# Patient Record
Sex: FEMALE | Race: WHITE | NOT HISPANIC OR LATINO | URBAN - METROPOLITAN AREA
[De-identification: names, ages, dates, MRNs, and addresses within clinical notes are randomized per-mention and may not be internally consistent; named-entity substitution may affect disease eponyms.]

---

## 2017-01-27 ENCOUNTER — OFFICE VISIT (OUTPATIENT)
Dept: INTERNAL MEDICINE | Facility: CLINIC | Age: 52
End: 2017-01-27

## 2017-01-27 VITALS
BODY MASS INDEX: 20.02 KG/M2 | WEIGHT: 113 LBS | DIASTOLIC BLOOD PRESSURE: 70 MMHG | RESPIRATION RATE: 20 BRPM | SYSTOLIC BLOOD PRESSURE: 115 MMHG | HEART RATE: 76 BPM | TEMPERATURE: 98 F

## 2017-01-27 DIAGNOSIS — J01.00 ACUTE MAXILLARY SINUSITIS, RECURRENCE NOT SPECIFIED: ICD-10-CM

## 2017-01-27 DIAGNOSIS — J06.9 UPPER RESPIRATORY TRACT INFECTION, UNSPECIFIED TYPE: Primary | ICD-10-CM

## 2017-01-27 LAB
EXPIRATION DATE: NORMAL
FLUAV AG NPH QL: NEGATIVE
FLUBV AG NPH QL: NEGATIVE
INTERNAL CONTROL: NORMAL
Lab: NORMAL

## 2017-01-27 PROCEDURE — 87804 INFLUENZA ASSAY W/OPTIC: CPT | Performed by: PHYSICIAN ASSISTANT

## 2017-01-27 PROCEDURE — 99213 OFFICE O/P EST LOW 20 MIN: CPT | Performed by: PHYSICIAN ASSISTANT

## 2017-01-27 RX ORDER — CLARITHROMYCIN 500 MG/1
500 TABLET, COATED ORAL 2 TIMES DAILY
Qty: 20 TABLET | Refills: 0 | Status: SHIPPED | OUTPATIENT
Start: 2017-01-27 | End: 2017-02-02

## 2017-01-27 NOTE — MR AVS SNAPSHOT
Sera Guidry   1/27/2017 1:45 PM   Office Visit    Provider:  ARIAN Atkins   Department:  [facility] INTERNAL MEDICINE AND PEDIATRICS   Dept Phone:  [phone]                Your Full Care Plan              Today's Medication Changes          These changes are accurate as of: 1/27/17  2:36 PM.  If you have any questions, ask your nurse or doctor.               New Medication(s)Ordered:     clarithromycin 500 MG tablet   Commonly known as:  BIAXIN   Take 1 tablet by mouth 2 (Two) Times a Day.         Stop taking medication(s)listed here:     cefdinir 300 MG capsule   Commonly known as:  OMNICEF                Where to Get Your Medications      These medications were sent to [facility] - [address] [facility] [address] - [phone]  - [phone]   [address] [facility] [address]     Phone:  [phone]     clarithromycin 500 MG tablet                  Your Updated Medication List          This list is accurate as of: 1/27/17  2:36 PM.  Always use your most recent med list.                busPIRone 7.5 MG tablet   Commonly known as:  BUSPAR   Take 1 tablet by mouth 3 (three) times a day.       clarithromycin 500 MG tablet   Commonly known as:  BIAXIN   Take 1 tablet by mouth 2 (Two) Times a Day.       cyclobenzaprine 10 MG tablet   Commonly known as:  FLEXERIL   Take 1 tablet by mouth at night as needed for muscle spasms. as needed; For: Sciatica       guaiFENesin 600 MG 12 hr tablet   Commonly known as:  MUCINEX   Take 1 tablet by mouth daily.       triamcinolone 0.025 % ointment   Commonly known as:  KENALOG   Apply  topically 2 (two) times a day.       varenicline 0.5 MG X 11 & 1 MG X 42 tablet   Commonly known as:  CHANTIX STARTING MONTH MELANIA   Take 0.5 mg one daily on days 1-2 and 0.5 mg twice daily on days 4-7. Then 1 mg twice daily for a total of 12 weeks.               We Performed the Following     POC Influenza A / B       You Were Diagnosed With        Codes Comments    Upper respiratory tract infection, unspecified type    -  Primary ICD-10-CM: J06.9  ICD-9-CM: 465.9     Acute maxillary sinusitis, recurrence not specified     ICD-10-CM: J01.00  ICD-9-CM: 461.0       Instructions     None    Patient Instructions History      AnaergiaharMarqui Signup     [facility] [employer] allows you to send messages to your doctor, view your test results, renew your prescriptions, schedule appointments, and more. To sign up, go to [employer] and click on the Sign Up Now link in the New User? box. Enter your [employer] Activation Code exactly as it appears below along with the last four digits of your Social Security Number and your Date of Birth () to complete the sign-up process. If you do not sign up before the expiration date, you must request a new code.    [employer] Activation Code: [email or web address]  Expires: 2/10/2017  2:35 PM    If you have questions, you can email [email or web address] or call [phone] to talk to our [employer] staff. Remember, [employer] is NOT to be used for urgent needs. For medical emergencies, dial 911.               Other Info from Your Visit           Your Appointments     2017 10:45 AM EST   Pap Smear/Pelvic Exam with Concha Robles DO   [facility] INTERNAL MEDICINE AND PEDIATRICS (--)    [address] [address] [phone]              Allergies     Penicillins        Reason for Visit     Generalized Body Aches           Vital Signs     Blood Pressure Pulse Temperature Respirations Weight Body Mass Index    115/70 76 98 °F (36.7 °C) (Temporal Artery ) 20 113 lb (51.3 kg) 20.02 kg/m2    Smoking Status                   Current Every Day Smoker           Problems and Diagnoses Noted     Upper respiratory infection    -  Primary    Acute maxillary sinusitis

## 2017-01-27 NOTE — PROGRESS NOTES
"Subjective   Sera Guidry is a 51 y.o. female.   Chief Complaint   Patient presents with   • Generalized Body Aches       History of Present Illness     Pt here with complaints of body aches.  She thought that she had food poisoning 5-6 days ago after she ate a mexican restaurant and had nausea, vomiting and diarrhea.  Thought it would resolve within 24 hours, but she continues to have symptoms 5-6 days later.  She feels like \"she has been run over by a bus.\"  Says that when she eats something, she either has diarrhea or vomiting after.  She felt feverish yesterday and took tylenol, and then started sweating.  No fevers today.  Occasionally feels the urge to have a BM, but can't.  People at her job have been ill as well. She reports sinus pressure, congestion, cough.  She was taking theraflu and mucinex.  She continues to smoke 3-4 cigarettes/day.     The following portions of the patient's history were reviewed and updated as appropriate: allergies, current medications, past family history, past medical history, past social history, past surgical history and problem list.    Review of Systems   Constitutional: Positive for chills and fever.   HENT: Positive for congestion, rhinorrhea, sinus pressure and sore throat.    Respiratory: Positive for cough.    Cardiovascular: Negative.    Gastrointestinal: Positive for abdominal pain, diarrhea and vomiting.   Genitourinary: Negative.    Musculoskeletal: Positive for myalgias.   Neurological: Negative.    Psychiatric/Behavioral: Negative.        Objective   Physical Exam   Constitutional: She is oriented to person, place, and time. She appears well-developed and well-nourished.   HENT:   Right Ear: External ear normal.   Left Ear: External ear normal.   Nose: Right sinus exhibits maxillary sinus tenderness and frontal sinus tenderness. Left sinus exhibits maxillary sinus tenderness and frontal sinus tenderness.   Mouth/Throat: Oropharynx is clear and moist.   Neck: " Normal range of motion. Neck supple. Thyromegaly present.   Cardiovascular: Normal rate, regular rhythm and normal heart sounds.    No murmur heard.  Pulmonary/Chest: Effort normal and breath sounds normal.   Abdominal: Bowel sounds are increased. There is tenderness.   Lymphadenopathy:     She has cervical adenopathy.   Neurological: She is alert and oriented to person, place, and time.   Psychiatric: She has a normal mood and affect. Judgment normal.   Vitals reviewed.    Results for orders placed or performed in visit on 01/27/17   POC Influenza A / B   Result Value Ref Range    Rapid Influenza A Ag NEGATIVE     Rapid Influenza B Ag NEGATIVE     Internal Control Passed Passed    Lot Number [id number]     Expiration Date 6-18          Assessment/Plan   Sera was seen today for generalized body aches.    Diagnoses and all orders for this visit:    Upper respiratory tract infection, unspecified type  -     POC Influenza A / B    Acute maxillary sinusitis, recurrence not specified    Other orders  -     clarithromycin (BIAXIN) 500 MG tablet; Take 1 tablet by mouth 2 (Two) Times a Day.

## 2017-02-02 ENCOUNTER — PROCEDURE VISIT (OUTPATIENT)
Dept: INTERNAL MEDICINE | Facility: CLINIC | Age: 52
End: 2017-02-02

## 2017-02-02 VITALS
HEART RATE: 64 BPM | BODY MASS INDEX: 20.02 KG/M2 | DIASTOLIC BLOOD PRESSURE: 90 MMHG | WEIGHT: 113 LBS | RESPIRATION RATE: 16 BRPM | SYSTOLIC BLOOD PRESSURE: 122 MMHG

## 2017-02-02 DIAGNOSIS — Z13.6 SCREENING FOR CARDIOVASCULAR CONDITION: ICD-10-CM

## 2017-02-02 DIAGNOSIS — E34.9 HORMONE IMBALANCE: ICD-10-CM

## 2017-02-02 DIAGNOSIS — G47.62 LEG CRAMPS, SLEEP RELATED: ICD-10-CM

## 2017-02-02 DIAGNOSIS — Z13.820 OSTEOPOROSIS SCREENING: ICD-10-CM

## 2017-02-02 DIAGNOSIS — Z12.11 ENCOUNTER FOR SCREENING COLONOSCOPY: Primary | ICD-10-CM

## 2017-02-02 DIAGNOSIS — L98.9 SKIN LESION: ICD-10-CM

## 2017-02-02 DIAGNOSIS — F41.9 ANXIETY: ICD-10-CM

## 2017-02-02 DIAGNOSIS — Z12.39 BREAST CANCER SCREENING: ICD-10-CM

## 2017-02-02 LAB
ALBUMIN SERPL-MCNC: 4.8 G/DL (ref 3.2–4.8)
ALBUMIN/GLOB SERPL: 1.5 G/DL (ref 1.5–2.5)
ALP SERPL-CCNC: 115 U/L (ref 25–100)
ALT SERPL W P-5'-P-CCNC: 26 U/L (ref 7–40)
ANION GAP SERPL CALCULATED.3IONS-SCNC: 6 MMOL/L (ref 3–11)
ARTICHOKE IGE QN: 118 MG/DL (ref 0–130)
AST SERPL-CCNC: 28 U/L (ref 0–33)
BILIRUB SERPL-MCNC: 0.6 MG/DL (ref 0.3–1.2)
BUN BLD-MCNC: 17 MG/DL (ref 9–23)
BUN/CREAT SERPL: 24.3 (ref 7–25)
CALCIUM SPEC-SCNC: 10.7 MG/DL (ref 8.7–10.4)
CHLORIDE SERPL-SCNC: 101 MMOL/L (ref 99–109)
CHOLEST SERPL-MCNC: 280 MG/DL (ref 0–200)
CO2 SERPL-SCNC: 29 MMOL/L (ref 20–31)
CREAT BLD-MCNC: 0.7 MG/DL (ref 0.6–1.3)
DEPRECATED RDW RBC AUTO: 47.5 FL (ref 37–54)
ERYTHROCYTE [DISTWIDTH] IN BLOOD BY AUTOMATED COUNT: 13.1 % (ref 11.3–14.5)
GFR SERPL CREATININE-BSD FRML MDRD: 88 ML/MIN/1.73
GLOBULIN UR ELPH-MCNC: 3.2 GM/DL
GLUCOSE BLD-MCNC: 102 MG/DL (ref 70–100)
HCT VFR BLD AUTO: 48.3 % (ref 34.5–44)
HDLC SERPL-MCNC: >115 MG/DL (ref 40–60)
HGB BLD-MCNC: 16.6 G/DL (ref 11.5–15.5)
MCH RBC QN AUTO: 34.2 PG (ref 27–31)
MCHC RBC AUTO-ENTMCNC: 34.4 G/DL (ref 32–36)
MCV RBC AUTO: 99.6 FL (ref 80–99)
PLATELET # BLD AUTO: 277 10*3/MM3 (ref 150–450)
PMV BLD AUTO: 10 FL (ref 6–12)
POTASSIUM BLD-SCNC: 4.7 MMOL/L (ref 3.5–5.5)
PROT SERPL-MCNC: 8 G/DL (ref 5.7–8.2)
RBC # BLD AUTO: 4.85 10*6/MM3 (ref 3.89–5.14)
SODIUM BLD-SCNC: 136 MMOL/L (ref 132–146)
TRIGL SERPL-MCNC: 85 MG/DL (ref 0–150)
WBC NRBC COR # BLD: 9.53 10*3/MM3 (ref 3.5–10.8)

## 2017-02-02 PROCEDURE — 80053 COMPREHEN METABOLIC PANEL: CPT | Performed by: INTERNAL MEDICINE

## 2017-02-02 PROCEDURE — 99396 PREV VISIT EST AGE 40-64: CPT | Performed by: INTERNAL MEDICINE

## 2017-02-02 PROCEDURE — 80061 LIPID PANEL: CPT | Performed by: INTERNAL MEDICINE

## 2017-02-02 PROCEDURE — 36415 COLL VENOUS BLD VENIPUNCTURE: CPT | Performed by: INTERNAL MEDICINE

## 2017-02-02 PROCEDURE — 85027 COMPLETE CBC AUTOMATED: CPT | Performed by: INTERNAL MEDICINE

## 2017-02-02 RX ORDER — TIZANIDINE 4 MG/1
4 TABLET ORAL NIGHTLY PRN
Qty: 30 TABLET | Refills: 3 | Status: SHIPPED | OUTPATIENT
Start: 2017-02-02 | End: 2018-01-25 | Stop reason: SDUPTHER

## 2017-02-02 RX ORDER — BUSPIRONE HYDROCHLORIDE 7.5 MG/1
7.5 TABLET ORAL 3 TIMES DAILY
Qty: 90 TABLET | Refills: 3 | Status: SHIPPED | OUTPATIENT
Start: 2017-02-02 | End: 2018-01-25 | Stop reason: SDUPTHER

## 2017-02-02 NOTE — PROGRESS NOTES
Subjective   Sera Guidry is a 51 y.o. female   Pt is here for physical today.         History of Present Illness   Pt is here for physical today. She has the chronic issue of anxiety and back spasms which is controlled on current medication.  Colonoscopy- Due now   Mammogram- Due now  Pap- Last Pap 30 years ago,  She had complete hysterectomy due to having cervical endometreosis?, had vaginal swab done after and states this was fine.   Breast exam- Due now   Tetanus-  Tdap due now   Flu vaccine- December 2016   Shingles vaccine- Pt is going to check with insurance   PNA vaccine- Not due til 60's.   DEXA scan- Due now.   Pt is down to 4 cigarettes per day, she has smoked since 11.   She is interested in seeing GYN to discuss hormone replacement options.         The following portions of the patient's history were reviewed and updated as appropriate: allergies, current medications, past family history, past medical history, past social history, past surgical history and problem list.           Review of Systems   Constitutional: Negative.    HENT: Negative.    Eyes: Negative.    Respiratory: Negative.    Cardiovascular: Negative.    Gastrointestinal: Negative.    Endocrine: Negative.    Genitourinary: Negative.    Musculoskeletal:        See HPI.    Skin: Negative.    Allergic/Immunologic: Negative.    Neurological: Negative.    Hematological: Negative.    Psychiatric/Behavioral:        See HPI.                 Objective   Physical Exam   Constitutional: She is oriented to person, place, and time. She appears well-developed and well-nourished.   HENT:   Head: Normocephalic and atraumatic.   Right Ear: External ear normal.   Left Ear: External ear normal.   Nose: Nose normal.   Mouth/Throat: Oropharynx is clear and moist.   Eyes: Conjunctivae and EOM are normal. Pupils are equal, round, and reactive to light.   Neck: Normal range of motion. Neck supple. No tracheal deviation present. No thyromegaly present.    Cardiovascular: Normal rate, regular rhythm, normal heart sounds and intact distal pulses.    Pulmonary/Chest: Effort normal and breath sounds normal. No respiratory distress. She has no wheezes. She has no rales. Chest wall is not dull to percussion. She exhibits no mass, no tenderness, no bony tenderness, no laceration, no crepitus, no edema, no deformity, no swelling and no retraction. Right breast exhibits no inverted nipple, no mass, no nipple discharge, no skin change and no tenderness. Left breast exhibits no inverted nipple, no mass, no nipple discharge, no skin change and no tenderness. Breasts are symmetrical. There is no breast swelling.   Abdominal: Soft. She exhibits no distension. There is no tenderness.   Genitourinary: No breast tenderness, discharge or bleeding.   Neurological: She is alert and oriented to person, place, and time. She has normal reflexes.   Skin: Skin is warm and dry.   seborrhoic keratosis on LUQ  One skin lesion noted on neck less than 1 cm in size.    Psychiatric: She has a normal mood and affect.   Nursing note and vitals reviewed.                 Assessment/Plan    Encounter for screening colonoscopy  -     Ambulatory Referral For Screening Colonoscopy    Anxiety    Leg cramps, sleep related  -     tiZANidine (ZANAFLEX) 4 MG tablet; Take 1 tablet by mouth At Night As Needed for muscle spasms.    Osteoporosis screening  -      DEXA SCAN    Breast cancer screening  -     Mammo Screening Bilateral With CAD; Future    Skin lesion  -     Ambulatory Referral to Dermatology    Hormone imbalance  -     Ambulatory Referral to Gynecology    Screening for cardiovascular condition  -     Comprehensive Metabolic Panel  -     Lipid Panel  -     CBC (No Diff)    Other orders  -     busPIRone (BUSPAR) 7.5 MG tablet; Take 1 tablet by mouth 3 (Three) Times a Day.          1.) Refer to Dermatology   2.) Check CBC,CMP and lipid panel   3.) Refer to Gyn   4.) Refer to Derm   5.) Colonoscopy and  Mammogram order placed.   6.) DEXA scan ordered   7.) Tdap due now- instructed to go to health department or Pharmacy.  8.) Refill chronic meds   9.) Stop Flexeril and change to Zanaflex 4 mg Po at night prn how to dose and possible s/e discussed.   10.) Pt has received flu vaccine this year.   11.) Follow up in 6 months     * Total time spent in discussion and exam 40 minutes.

## 2017-02-03 ENCOUNTER — TRANSCRIBE ORDERS (OUTPATIENT)
Dept: INTERNAL MEDICINE | Facility: CLINIC | Age: 52
End: 2017-02-03

## 2017-02-03 DIAGNOSIS — R92.8 ABNORMAL MAMMOGRAM: Primary | ICD-10-CM

## 2017-02-03 DIAGNOSIS — Z12.31 VISIT FOR SCREENING MAMMOGRAM: Primary | ICD-10-CM

## 2018-01-25 ENCOUNTER — OFFICE VISIT (OUTPATIENT)
Dept: INTERNAL MEDICINE | Facility: CLINIC | Age: 53
End: 2018-01-25

## 2018-01-25 VITALS
OXYGEN SATURATION: 96 % | WEIGHT: 115.2 LBS | HEART RATE: 71 BPM | SYSTOLIC BLOOD PRESSURE: 122 MMHG | HEIGHT: 63 IN | DIASTOLIC BLOOD PRESSURE: 84 MMHG | TEMPERATURE: 98.1 F | BODY MASS INDEX: 20.41 KG/M2

## 2018-01-25 DIAGNOSIS — Z13.820 OSTEOPOROSIS SCREENING: ICD-10-CM

## 2018-01-25 DIAGNOSIS — F33.41 RECURRENT MAJOR DEPRESSIVE DISORDER, IN PARTIAL REMISSION (HCC): ICD-10-CM

## 2018-01-25 DIAGNOSIS — Z11.59 NEED FOR HEPATITIS C SCREENING TEST: ICD-10-CM

## 2018-01-25 DIAGNOSIS — Z78.0 MENOPAUSE: ICD-10-CM

## 2018-01-25 DIAGNOSIS — F41.9 ANXIETY: ICD-10-CM

## 2018-01-25 DIAGNOSIS — Z12.11 ENCOUNTER FOR SCREENING COLONOSCOPY: ICD-10-CM

## 2018-01-25 DIAGNOSIS — Z13.220 SCREENING FOR LIPID DISORDERS: ICD-10-CM

## 2018-01-25 DIAGNOSIS — R73.09 ABNORMAL GLUCOSE: ICD-10-CM

## 2018-01-25 DIAGNOSIS — E83.52 SERUM CALCIUM ELEVATED: ICD-10-CM

## 2018-01-25 DIAGNOSIS — G47.62 LEG CRAMPS, SLEEP RELATED: ICD-10-CM

## 2018-01-25 DIAGNOSIS — R53.83 OTHER FATIGUE: ICD-10-CM

## 2018-01-25 DIAGNOSIS — Z76.89 ENCOUNTER TO ESTABLISH CARE WITH NEW DOCTOR: ICD-10-CM

## 2018-01-25 DIAGNOSIS — Z72.0 TOBACCO USE: ICD-10-CM

## 2018-01-25 DIAGNOSIS — Z79.899 ENCOUNTER FOR LONG-TERM CURRENT USE OF MEDICATION: ICD-10-CM

## 2018-01-25 DIAGNOSIS — Z87.898 HISTORY OF ABNORMAL MAMMOGRAM: Primary | ICD-10-CM

## 2018-01-25 LAB
25(OH)D3 SERPL-MCNC: 35.1 NG/ML
ALBUMIN SERPL-MCNC: 4.6 G/DL (ref 3.2–4.8)
ALBUMIN/GLOB SERPL: 2 G/DL (ref 1.5–2.5)
ALP SERPL-CCNC: 96 U/L (ref 25–100)
ALT SERPL W P-5'-P-CCNC: 15 U/L (ref 7–40)
ANION GAP SERPL CALCULATED.3IONS-SCNC: 4 MMOL/L (ref 3–11)
ARTICHOKE IGE QN: 133 MG/DL (ref 0–130)
AST SERPL-CCNC: 22 U/L (ref 0–33)
BASOPHILS # BLD AUTO: 0.06 10*3/MM3 (ref 0–0.2)
BASOPHILS NFR BLD AUTO: 0.5 % (ref 0–1)
BILIRUB SERPL-MCNC: 0.3 MG/DL (ref 0.3–1.2)
BUN BLD-MCNC: 19 MG/DL (ref 9–23)
BUN/CREAT SERPL: 31.7 (ref 7–25)
CALCIUM SPEC-SCNC: 10.1 MG/DL (ref 8.7–10.4)
CHLORIDE SERPL-SCNC: 107 MMOL/L (ref 99–109)
CHOLEST SERPL-MCNC: 219 MG/DL (ref 0–200)
CK SERPL-CCNC: 96 U/L (ref 26–174)
CO2 SERPL-SCNC: 27 MMOL/L (ref 20–31)
CREAT BLD-MCNC: 0.6 MG/DL (ref 0.6–1.3)
DEPRECATED RDW RBC AUTO: 49.1 FL (ref 37–54)
EOSINOPHIL # BLD AUTO: 0.62 10*3/MM3 (ref 0–0.3)
EOSINOPHIL NFR BLD AUTO: 5.4 % (ref 0–3)
ERYTHROCYTE [DISTWIDTH] IN BLOOD BY AUTOMATED COUNT: 14 % (ref 11.3–14.5)
FOLATE SERPL-MCNC: 14.01 NG/ML (ref 3.2–20)
GFR SERPL CREATININE-BSD FRML MDRD: 105 ML/MIN/1.73
GLOBULIN UR ELPH-MCNC: 2.3 GM/DL
GLUCOSE BLD-MCNC: 75 MG/DL (ref 70–100)
HBA1C MFR BLD: 5.8 % (ref 4.8–5.6)
HCT VFR BLD AUTO: 39.7 % (ref 34.5–44)
HCV AB SER DONR QL: NORMAL
HDLC SERPL-MCNC: 75 MG/DL (ref 40–60)
HGB BLD-MCNC: 13.1 G/DL (ref 11.5–15.5)
IMM GRANULOCYTES # BLD: 0.03 10*3/MM3 (ref 0–0.03)
IMM GRANULOCYTES NFR BLD: 0.3 % (ref 0–0.6)
LYMPHOCYTES # BLD AUTO: 5.05 10*3/MM3 (ref 0.6–4.8)
LYMPHOCYTES NFR BLD AUTO: 44 % (ref 24–44)
MCH RBC QN AUTO: 31.5 PG (ref 27–31)
MCHC RBC AUTO-ENTMCNC: 33 G/DL (ref 32–36)
MCV RBC AUTO: 95.4 FL (ref 80–99)
MONOCYTES # BLD AUTO: 0.88 10*3/MM3 (ref 0–1)
MONOCYTES NFR BLD AUTO: 7.7 % (ref 0–12)
NEUTROPHILS # BLD AUTO: 4.84 10*3/MM3 (ref 1.5–8.3)
NEUTROPHILS NFR BLD AUTO: 42.1 % (ref 41–71)
PLATELET # BLD AUTO: 379 10*3/MM3 (ref 150–450)
PMV BLD AUTO: 10 FL (ref 6–12)
POTASSIUM BLD-SCNC: 4.1 MMOL/L (ref 3.5–5.5)
PROT SERPL-MCNC: 6.9 G/DL (ref 5.7–8.2)
RBC # BLD AUTO: 4.16 10*6/MM3 (ref 3.89–5.14)
SODIUM BLD-SCNC: 138 MMOL/L (ref 132–146)
TRIGL SERPL-MCNC: 106 MG/DL (ref 0–150)
TSH SERPL DL<=0.05 MIU/L-ACNC: 2.11 MIU/ML (ref 0.35–5.35)
VIT B12 BLD-MCNC: 751 PG/ML (ref 211–911)
WBC NRBC COR # BLD: 11.48 10*3/MM3 (ref 3.5–10.8)

## 2018-01-25 PROCEDURE — 82607 VITAMIN B-12: CPT | Performed by: FAMILY MEDICINE

## 2018-01-25 PROCEDURE — 82550 ASSAY OF CK (CPK): CPT | Performed by: FAMILY MEDICINE

## 2018-01-25 PROCEDURE — 86803 HEPATITIS C AB TEST: CPT | Performed by: FAMILY MEDICINE

## 2018-01-25 PROCEDURE — 82306 VITAMIN D 25 HYDROXY: CPT | Performed by: FAMILY MEDICINE

## 2018-01-25 PROCEDURE — 99214 OFFICE O/P EST MOD 30 MIN: CPT | Performed by: FAMILY MEDICINE

## 2018-01-25 PROCEDURE — 83036 HEMOGLOBIN GLYCOSYLATED A1C: CPT | Performed by: FAMILY MEDICINE

## 2018-01-25 PROCEDURE — 83970 ASSAY OF PARATHORMONE: CPT | Performed by: FAMILY MEDICINE

## 2018-01-25 PROCEDURE — 80061 LIPID PANEL: CPT | Performed by: FAMILY MEDICINE

## 2018-01-25 PROCEDURE — 82746 ASSAY OF FOLIC ACID SERUM: CPT | Performed by: FAMILY MEDICINE

## 2018-01-25 PROCEDURE — 80050 GENERAL HEALTH PANEL: CPT | Performed by: FAMILY MEDICINE

## 2018-01-25 RX ORDER — BUSPIRONE HYDROCHLORIDE 7.5 MG/1
7.5 TABLET ORAL NIGHTLY
Qty: 90 TABLET | Refills: 1 | Status: SHIPPED | OUTPATIENT
Start: 2018-01-25 | End: 2018-08-01 | Stop reason: SDUPTHER

## 2018-01-25 RX ORDER — TIZANIDINE 4 MG/1
4 TABLET ORAL NIGHTLY PRN
Qty: 30 TABLET | Refills: 3 | Status: SHIPPED | OUTPATIENT
Start: 2018-01-25 | End: 2018-02-16 | Stop reason: SDUPTHER

## 2018-01-25 RX ORDER — CITALOPRAM 10 MG/1
10 TABLET ORAL DAILY
Qty: 30 TABLET | Refills: 1 | Status: SHIPPED | OUTPATIENT
Start: 2018-01-25 | End: 2018-02-16 | Stop reason: SDUPTHER
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
## 2018-01-27 LAB — PTH-INTACT SERPL-MCNC: 25 PG/ML (ref 15–65)

## 2018-02-02 DIAGNOSIS — Z78.0 MENOPAUSE: Primary | ICD-10-CM

## 2018-02-16 ENCOUNTER — OFFICE VISIT (OUTPATIENT)
Dept: INTERNAL MEDICINE | Facility: CLINIC | Age: 53
End: 2018-02-16

## 2018-02-16 VITALS
SYSTOLIC BLOOD PRESSURE: 124 MMHG | TEMPERATURE: 98.2 F | DIASTOLIC BLOOD PRESSURE: 76 MMHG | HEART RATE: 73 BPM | BODY MASS INDEX: 20.36 KG/M2 | WEIGHT: 114.2 LBS | OXYGEN SATURATION: 98 %

## 2018-02-16 DIAGNOSIS — F33.41 RECURRENT MAJOR DEPRESSIVE DISORDER, IN PARTIAL REMISSION (HCC): ICD-10-CM

## 2018-02-16 DIAGNOSIS — G47.62 LEG CRAMPS, SLEEP RELATED: ICD-10-CM

## 2018-02-16 DIAGNOSIS — F41.9 ANXIETY: Primary | ICD-10-CM

## 2018-02-16 PROCEDURE — 99214 OFFICE O/P EST MOD 30 MIN: CPT | Performed by: FAMILY MEDICINE

## 2018-02-16 RX ORDER — CHOLECALCIFEROL (VITAMIN D3) 125 MCG
500 CAPSULE ORAL DAILY
COMMUNITY

## 2018-02-16 RX ORDER — CITALOPRAM 10 MG/1
10 TABLET ORAL DAILY
Qty: 30 TABLET | Refills: 3 | Status: SHIPPED | OUTPATIENT
Start: 2018-02-16 | End: 2018-08-01 | Stop reason: DRUGHIGH

## 2018-02-16 RX ORDER — TIZANIDINE 4 MG/1
4 TABLET ORAL 2 TIMES DAILY PRN
Qty: 60 TABLET | Refills: 3 | Status: SHIPPED | OUTPATIENT
Start: 2018-02-16 | End: 2018-08-01

## 2018-02-16 NOTE — PROGRESS NOTES
Subjective   Sera Guidry is a 52 y.o. female.     Chief Complaint   Patient presents with   • med management     3 month follow up   • Headache       Visit Vitals   • /76   • Pulse 73   • Temp 98.2 °F (36.8 °C)   • Wt 51.8 kg (114 lb 3.2 oz)   • SpO2 98%  Comment: nail polish may have altered results   • BMI 20.36 kg/m2       Depression   Visit Type: follow-up  Patient is not experiencing: anhedonia, chest pain, choking sensation, compulsions, confusion, decreased concentration, depressed mood, dizziness, dry mouth, excessive worry, fatigue, feelings of hopelessness, feelings of worthlessness, hypersomnia, hyperventilation, insomnia, irritability, malaise, memory impairment, muscle tension, nausea, nervousness/anxiety, obsessions, palpitations, panic, psychomotor agitation, psychomotor retardation, restlessness, shortness of breath, suicidal ideas, suicidal planning, thoughts of death, weight gain and weight loss.  Frequency of symptoms: rarely   Severity: mild   Sleep quality: fair  Nighttime awakenings: several  Compliance with medications:  %        Anxiety   Presents for follow-up visit. Patient reports no chest pain, compulsions, confusion, decreased concentration, depressed mood, dizziness, dry mouth, excessive worry, feeling of choking, hyperventilation, insomnia, irritability, malaise, muscle tension, nausea, nervous/anxious behavior, obsessions, palpitations, panic, restlessness, shortness of breath or suicidal ideas. Symptoms occur rarely. The severity of symptoms is mild. The quality of sleep is fair. Nighttime awakenings: several.     Her past medical history is significant for depression. Compliance with medications is %.   Headache    This is a new problem. The current episode started 1 to 4 weeks ago. The problem occurs daily. The problem has been unchanged. The pain is located in the bilateral region. The pain radiates to the left shoulder, right shoulder, right neck and left  neck. The pain quality is similar to prior headaches. The quality of the pain is described as dull and aching. The pain is at a severity of 9/10. The pain is severe. Associated symptoms include back pain, muscle aches, neck pain, phonophobia, photophobia and scalp tenderness. Pertinent negatives include no abdominal pain, abnormal behavior, anorexia, blurred vision, coughing, dizziness, drainage, ear pain, eye pain, eye redness, eye watering, facial sweating, fever, hearing loss, insomnia, loss of balance, nausea, numbness, rhinorrhea, seizures, sinus pressure, sore throat, swollen glands, tingling, tinnitus, visual change, vomiting, weakness or weight loss. Nothing aggravates the symptoms. She has tried NSAIDs for the symptoms. The treatment provided significant relief. Her past medical history is significant for migraine headaches and migraines in the family. There is no history of cancer, cluster headaches, hypertension, immunosuppression, obesity, pseudotumor cerebri, recent head traumas, sinus disease or TMJ.      Pt has headache that starts as tension headache and goes up over top of scalp.  Aleve heals for a short while.   Pt has had neck popped by Chiropractor-Dr Prabhjot Acosta with rapid improvement of headache.   Pt has hx of scoliosis.    The following portions of the patient's history were reviewed and updated as appropriate: allergies, current medications, past family history, past medical history, past social history, past surgical history and problem list.    Past Medical History:   Diagnosis Date   • Allergy    • Anxiety    • Depression    • Ovarian cancer     endometrial/cervical   • Ovarian cyst      Past Surgical History:   Procedure Laterality Date   • APPENDECTOMY     • HYSTERECTOMY     • ORIF WRIST FRACTURE Left 09/2017    with 2 plates   • TOTAL ABDOMINAL HYSTERECTOMY WITH SALPINGO OOPHORECTOMY      ovaries removed at 2 separate occcasions     Allergies   Allergen Reactions   • Penicillins Hives      Family History   Problem Relation Age of Onset   • Allergies Other    • ADD / ADHD Other    • Heart disease Other      cardiac disorder   • Stroke Other    • Depression Other    • Hypertension Other    • Lung cancer Other    • Uterine cancer Other    • Ovarian cancer Other    • Cancer Mother      left lung   • Stroke Mother      x 2   • Stroke Father      x 3   • Heart disease Father      heart attack   • No Known Problems Son    • No Known Problems Son    • No Known Problems Son      Social History     Social History   • Marital status:      Spouse name: N/A   • Number of children: N/A   • Years of education: N/A     Occupational History   • Not on file.     Social History Main Topics   • Smoking status: Current Every Day Smoker     Packs/day: 0.50     Years: 40.00     Types: Cigarettes   • Smokeless tobacco: Never Used   • Alcohol use No      Comment: quit   • Drug use: No      Comment: quit MJ   • Sexual activity: Defer     Other Topics Concern   • Not on file     Social History Narrative       Review of Systems   Constitutional: Negative.  Negative for chills, diaphoresis, fatigue, fever, irritability, weight gain and weight loss.   HENT: Negative for ear pain, hearing loss, nosebleeds, postnasal drip, rhinorrhea, sinus pressure, sneezing, sore throat and tinnitus.    Eyes: Positive for photophobia. Negative for blurred vision, pain, redness and itching.   Respiratory: Negative.  Negative for cough, choking, shortness of breath and wheezing.    Cardiovascular: Negative.  Negative for chest pain and palpitations.   Gastrointestinal: Negative.  Negative for abdominal pain, anorexia, constipation, diarrhea, nausea and vomiting.   Endocrine: Negative.  Negative for cold intolerance and heat intolerance.   Genitourinary: Negative.  Negative for dysuria, frequency, hematuria and urgency.   Musculoskeletal: Positive for back pain and neck pain. Negative for arthralgias.   Skin: Negative.  Negative for color  change and rash.   Allergic/Immunologic: Negative.  Negative for environmental allergies.   Neurological: Positive for headaches. Negative for dizziness, tingling, seizures, syncope, weakness, light-headedness, numbness and loss of balance.   Hematological: Negative.  Negative for adenopathy. Does not bruise/bleed easily.   Psychiatric/Behavioral: Negative.  Negative for confusion, decreased concentration, dysphoric mood and suicidal ideas. The patient is not nervous/anxious and does not have insomnia.         Anxiety and depression stable on medication       Objective   Physical Exam   Constitutional: She is oriented to person, place, and time. She appears well-developed.   HENT:   Head: Normocephalic.   Right Ear: External ear normal.   Left Ear: External ear normal.   Nose: Nose normal.   Mouth/Throat: Oropharynx is clear and moist.   Eyes: Conjunctivae and EOM are normal. Pupils are equal, round, and reactive to light.   Neck: Normal range of motion. Neck supple. Spinous process tenderness and muscular tenderness present.   Cardiovascular: Normal rate and regular rhythm.    No murmur heard.  Pulmonary/Chest: Effort normal and breath sounds normal. No respiratory distress. She has no decreased breath sounds. She has no wheezes. She has no rhonchi. She has no rales.   Abdominal: Soft. Bowel sounds are normal. There is no tenderness.   Musculoskeletal: Normal range of motion.   Neurological: She is alert and oriented to person, place, and time.   Skin: Skin is warm and dry.   Psychiatric: She has a normal mood and affect. Her behavior is normal.   Nursing note and vitals reviewed.      Assessment/Plan   Sera was seen today for med management and headache.    Diagnoses and all orders for this visit:    Anxiety    Recurrent major depressive disorder, in partial remission  -     citalopram (CeleXA) 10 MG tablet; Take 1 tablet by mouth Daily.    Leg cramps, sleep related  -     tiZANidine (ZANAFLEX) 4 MG tablet; Take  1 tablet by mouth 2 (Two) Times a Day As Needed for Muscle Spasms.        Ok to take mid day zanaflex for trapezius spasm  Reviewed lab with pt.            Current Outpatient Prescriptions:   •  busPIRone (BUSPAR) 7.5 MG tablet, Take 1 tablet by mouth Every Night., Disp: 90 tablet, Rfl: 1  •  Cholecalciferol (VITAMIN D PO), Take  by mouth., Disp: , Rfl:   •  citalopram (CeleXA) 10 MG tablet, Take 1 tablet by mouth Daily., Disp: 30 tablet, Rfl: 3  •  Multiple Vitamins-Minerals (MULTIVITAMIN WOMEN PO), Take  by mouth., Disp: , Rfl:   •  tiZANidine (ZANAFLEX) 4 MG tablet, Take 1 tablet by mouth 2 (Two) Times a Day As Needed for Muscle Spasms., Disp: 60 tablet, Rfl: 3  •  vitamin B-12 (CYANOCOBALAMIN) 500 MCG tablet, Take 500 mcg by mouth Daily., Disp: , Rfl:   •  VITAMIN E PO, Take  by mouth., Disp: , Rfl:     Return in about 3 months (around 5/16/2018), or if symptoms worsen or fail to improve, for Recheck.    Recent Results (from the past 840 hour(s))   TSH    Collection Time: 01/25/18  4:26 PM   Result Value Ref Range    TSH 2.111 0.350 - 5.350 mIU/mL   Comprehensive Metabolic Panel    Collection Time: 01/25/18  4:26 PM   Result Value Ref Range    Glucose 75 70 - 100 mg/dL    BUN 19 9 - 23 mg/dL    Creatinine 0.60 0.60 - 1.30 mg/dL    Sodium 138 132 - 146 mmol/L    Potassium 4.1 3.5 - 5.5 mmol/L    Chloride 107 99 - 109 mmol/L    CO2 27.0 20.0 - 31.0 mmol/L    Calcium 10.1 8.7 - 10.4 mg/dL    Total Protein 6.9 5.7 - 8.2 g/dL    Albumin 4.60 3.20 - 4.80 g/dL    ALT (SGPT) 15 7 - 40 U/L    AST (SGOT) 22 0 - 33 U/L    Alkaline Phosphatase 96 25 - 100 U/L    Total Bilirubin 0.3 0.3 - 1.2 mg/dL    eGFR Non African Amer 105 >60 mL/min/1.73    Globulin 2.3 gm/dL    A/G Ratio 2.0 1.5 - 2.5 g/dL    BUN/Creatinine Ratio 31.7 (H) 7.0 - 25.0    Anion Gap 4.0 3.0 - 11.0 mmol/L   Lipid Panel    Collection Time: 01/25/18  4:26 PM   Result Value Ref Range    Total Cholesterol 219 (H) 0 - 200 mg/dL    Triglycerides 106 0 - 150 mg/dL     HDL Cholesterol 75 (H) 40 - 60 mg/dL    LDL Cholesterol  133 (H) 0 - 130 mg/dL   Hepatitis C Antibody    Collection Time: 01/25/18  4:26 PM   Result Value Ref Range    Hepatitis C Ab Non-Reactive Non-Reactive   Hemoglobin A1c    Collection Time: 01/25/18  4:26 PM   Result Value Ref Range    Hemoglobin A1C 5.80 (H) 4.80 - 5.60 %   Vitamin B12    Collection Time: 01/25/18  4:26 PM   Result Value Ref Range    Vitamin B-12 751 211 - 911 pg/mL   Folate    Collection Time: 01/25/18  4:26 PM   Result Value Ref Range    Folate 14.01 3.20 - 20.00 ng/mL   Vitamin D 25 Hydroxy    Collection Time: 01/25/18  4:26 PM   Result Value Ref Range    25 Hydroxy, Vitamin D 35.1 ng/ml   CK    Collection Time: 01/25/18  4:26 PM   Result Value Ref Range    Creatine Kinase 96 26 - 174 U/L   PTH, Intact    Collection Time: 01/25/18  4:26 PM   Result Value Ref Range    PTH, Intact 25 15 - 65 pg/mL   CBC Auto Differential    Collection Time: 01/25/18  4:26 PM   Result Value Ref Range    WBC 11.48 (H) 3.50 - 10.80 10*3/mm3    RBC 4.16 3.89 - 5.14 10*6/mm3    Hemoglobin 13.1 11.5 - 15.5 g/dL    Hematocrit 39.7 34.5 - 44.0 %    MCV 95.4 80.0 - 99.0 fL    MCH 31.5 (H) 27.0 - 31.0 pg    MCHC 33.0 32.0 - 36.0 g/dL    RDW 14.0 11.3 - 14.5 %    RDW-SD 49.1 37.0 - 54.0 fl    MPV 10.0 6.0 - 12.0 fL    Platelets 379 150 - 450 10*3/mm3    Neutrophil % 42.1 41.0 - 71.0 %    Lymphocyte % 44.0 24.0 - 44.0 %    Monocyte % 7.7 0.0 - 12.0 %    Eosinophil % 5.4 (H) 0.0 - 3.0 %    Basophil % 0.5 0.0 - 1.0 %    Immature Grans % 0.3 0.0 - 0.6 %    Neutrophils, Absolute 4.84 1.50 - 8.30 10*3/mm3    Lymphocytes, Absolute 5.05 (H) 0.60 - 4.80 10*3/mm3    Monocytes, Absolute 0.88 0.00 - 1.00 10*3/mm3    Eosinophils, Absolute 0.62 (H) 0.00 - 0.30 10*3/mm3    Basophils, Absolute 0.06 0.00 - 0.20 10*3/mm3    Immature Grans, Absolute 0.03 0.00 - 0.03 10*3/mm3

## 2018-03-27 ENCOUNTER — TELEPHONE (OUTPATIENT)
Dept: INTERNAL MEDICINE | Facility: CLINIC | Age: 53
End: 2018-03-27

## 2018-04-04 ENCOUNTER — APPOINTMENT (OUTPATIENT)
Dept: GENERAL RADIOLOGY | Facility: HOSPITAL | Age: 53
End: 2018-04-04

## 2018-04-04 ENCOUNTER — APPOINTMENT (OUTPATIENT)
Dept: CT IMAGING | Facility: HOSPITAL | Age: 53
End: 2018-04-04

## 2018-04-04 ENCOUNTER — HOSPITAL ENCOUNTER (EMERGENCY)
Facility: HOSPITAL | Age: 53
Discharge: HOME | End: 2018-04-04
Attending: EMERGENCY MEDICINE | Admitting: EMERGENCY MEDICINE

## 2018-04-04 VITALS
RESPIRATION RATE: 16 BRPM | OXYGEN SATURATION: 95 % | WEIGHT: 120 LBS | BODY MASS INDEX: 19.99 KG/M2 | TEMPERATURE: 98.2 F | SYSTOLIC BLOOD PRESSURE: 172 MMHG | HEIGHT: 65 IN | HEART RATE: 62 BPM | DIASTOLIC BLOOD PRESSURE: 98 MMHG

## 2018-04-04 DIAGNOSIS — Z72.0 TOBACCO ABUSE: ICD-10-CM

## 2018-04-04 DIAGNOSIS — I99.9 VASCULAR DISEASE: ICD-10-CM

## 2018-04-04 DIAGNOSIS — F10.929 ALCOHOLIC INTOXICATION WITH COMPLICATION (HCC): ICD-10-CM

## 2018-04-04 DIAGNOSIS — K29.70 GASTRITIS WITHOUT BLEEDING, UNSPECIFIED CHRONICITY, UNSPECIFIED GASTRITIS TYPE: ICD-10-CM

## 2018-04-04 DIAGNOSIS — R07.89 ATYPICAL CHEST PAIN: ICD-10-CM

## 2018-04-04 DIAGNOSIS — R10.13 EPIGASTRIC PAIN: Primary | ICD-10-CM

## 2018-04-04 LAB
ALBUMIN SERPL-MCNC: 4.4 G/DL (ref 3.2–4.8)
ALBUMIN/GLOB SERPL: 1.6 G/DL (ref 1.5–2.5)
ALP SERPL-CCNC: 99 U/L (ref 25–100)
ALT SERPL W P-5'-P-CCNC: 24 U/L (ref 7–40)
AMPHET+METHAMPHET UR QL: NEGATIVE
AMPHETAMINES UR QL: NEGATIVE
ANION GAP SERPL CALCULATED.3IONS-SCNC: 14 MMOL/L (ref 3–11)
APTT PPP: 35.1 SECONDS (ref 24–31)
AST SERPL-CCNC: 24 U/L (ref 0–33)
BARBITURATES UR QL SCN: NEGATIVE
BASOPHILS # BLD AUTO: 0.05 10*3/MM3 (ref 0–0.2)
BASOPHILS NFR BLD AUTO: 0.4 % (ref 0–1)
BENZODIAZ UR QL SCN: NEGATIVE
BILIRUB SERPL-MCNC: 0.3 MG/DL (ref 0.3–1.2)
BILIRUB UR QL STRIP: NEGATIVE
BNP SERPL-MCNC: 28 PG/ML (ref 0–100)
BUN BLD-MCNC: 11 MG/DL (ref 9–23)
BUN/CREAT SERPL: 22 (ref 7–25)
BUPRENORPHINE SERPL-MCNC: NEGATIVE NG/ML
CALCIUM SPEC-SCNC: 9.3 MG/DL (ref 8.7–10.4)
CANNABINOIDS SERPL QL: NEGATIVE
CHLORIDE SERPL-SCNC: 99 MMOL/L (ref 99–109)
CLARITY UR: CLEAR
CO2 SERPL-SCNC: 22 MMOL/L (ref 20–31)
COCAINE UR QL: NEGATIVE
COLOR UR: YELLOW
CREAT BLD-MCNC: 0.5 MG/DL (ref 0.6–1.3)
DEPRECATED RDW RBC AUTO: 45 FL (ref 37–54)
EOSINOPHIL # BLD AUTO: 0.22 10*3/MM3 (ref 0–0.3)
EOSINOPHIL NFR BLD AUTO: 1.8 % (ref 0–3)
ERYTHROCYTE [DISTWIDTH] IN BLOOD BY AUTOMATED COUNT: 13 % (ref 11.3–14.5)
ETHANOL BLD-MCNC: 233 MG/DL (ref 0–10)
GFR SERPL CREATININE-BSD FRML MDRD: 129 ML/MIN/1.73
GLOBULIN UR ELPH-MCNC: 2.8 GM/DL
GLUCOSE BLD-MCNC: 95 MG/DL (ref 70–100)
GLUCOSE UR STRIP-MCNC: NEGATIVE MG/DL
HCT VFR BLD AUTO: 44.3 % (ref 34.5–44)
HGB BLD-MCNC: 15.1 G/DL (ref 11.5–15.5)
HGB UR QL STRIP.AUTO: NEGATIVE
HOLD SPECIMEN: NORMAL
HOLD SPECIMEN: NORMAL
IMM GRANULOCYTES # BLD: 0.03 10*3/MM3 (ref 0–0.03)
IMM GRANULOCYTES NFR BLD: 0.2 % (ref 0–0.6)
INR PPP: 0.95 (ref 0.91–1.09)
KETONES UR QL STRIP: NEGATIVE
LEUKOCYTE ESTERASE UR QL STRIP.AUTO: NEGATIVE
LIPASE SERPL-CCNC: 30 U/L (ref 6–51)
LYMPHOCYTES # BLD AUTO: 3.74 10*3/MM3 (ref 0.6–4.8)
LYMPHOCYTES NFR BLD AUTO: 30.5 % (ref 24–44)
MAGNESIUM SERPL-MCNC: 2.2 MG/DL (ref 1.3–2.7)
MCH RBC QN AUTO: 32.1 PG (ref 27–31)
MCHC RBC AUTO-ENTMCNC: 34.1 G/DL (ref 32–36)
MCV RBC AUTO: 94.1 FL (ref 80–99)
METHADONE UR QL SCN: NEGATIVE
MONOCYTES # BLD AUTO: 0.71 10*3/MM3 (ref 0–1)
MONOCYTES NFR BLD AUTO: 5.8 % (ref 0–12)
NEUTROPHILS # BLD AUTO: 7.5 10*3/MM3 (ref 1.5–8.3)
NEUTROPHILS NFR BLD AUTO: 61.3 % (ref 41–71)
NITRITE UR QL STRIP: NEGATIVE
OPIATES UR QL: NEGATIVE
OXYCODONE UR QL SCN: NEGATIVE
PCP UR QL SCN: NEGATIVE
PH UR STRIP.AUTO: 5.5 [PH] (ref 5–8)
PLATELET # BLD AUTO: 307 10*3/MM3 (ref 150–450)
PMV BLD AUTO: 9.5 FL (ref 6–12)
POTASSIUM BLD-SCNC: 4.3 MMOL/L (ref 3.5–5.5)
PROPOXYPH UR QL: NEGATIVE
PROT SERPL-MCNC: 7.2 G/DL (ref 5.7–8.2)
PROT UR QL STRIP: NEGATIVE
PROTHROMBIN TIME: 10 SECONDS (ref 9.6–11.5)
RBC # BLD AUTO: 4.71 10*6/MM3 (ref 3.89–5.14)
SODIUM BLD-SCNC: 135 MMOL/L (ref 132–146)
SP GR UR STRIP: <=1.005 (ref 1–1.03)
TRICYCLICS UR QL SCN: NEGATIVE
TROPONIN I SERPL-MCNC: 0 NG/ML (ref 0–0.07)
TROPONIN I SERPL-MCNC: 0.01 NG/ML (ref 0–0.07)
UROBILINOGEN UR QL STRIP: NORMAL
WBC NRBC COR # BLD: 12.25 10*3/MM3 (ref 3.5–10.8)
WHOLE BLOOD HOLD SPECIMEN: NORMAL
WHOLE BLOOD HOLD SPECIMEN: NORMAL

## 2018-04-04 PROCEDURE — 96375 TX/PRO/DX INJ NEW DRUG ADDON: CPT

## 2018-04-04 PROCEDURE — 83735 ASSAY OF MAGNESIUM: CPT | Performed by: EMERGENCY MEDICINE

## 2018-04-04 PROCEDURE — 80307 DRUG TEST PRSMV CHEM ANLYZR: CPT | Performed by: EMERGENCY MEDICINE

## 2018-04-04 PROCEDURE — 99285 EMERGENCY DEPT VISIT HI MDM: CPT

## 2018-04-04 PROCEDURE — 25010000002 HYDROMORPHONE PER 4 MG: Performed by: EMERGENCY MEDICINE

## 2018-04-04 PROCEDURE — 25010000002 THIAMINE PER 100 MG: Performed by: EMERGENCY MEDICINE

## 2018-04-04 PROCEDURE — 25010000002 ONDANSETRON PER 1 MG: Performed by: EMERGENCY MEDICINE

## 2018-04-04 PROCEDURE — 80053 COMPREHEN METABOLIC PANEL: CPT | Performed by: EMERGENCY MEDICINE

## 2018-04-04 PROCEDURE — 81003 URINALYSIS AUTO W/O SCOPE: CPT | Performed by: EMERGENCY MEDICINE

## 2018-04-04 PROCEDURE — 96376 TX/PRO/DX INJ SAME DRUG ADON: CPT

## 2018-04-04 PROCEDURE — 96365 THER/PROPH/DIAG IV INF INIT: CPT

## 2018-04-04 PROCEDURE — 74177 CT ABD & PELVIS W/CONTRAST: CPT

## 2018-04-04 PROCEDURE — 0 DIATRIZOATE MEGLUMINE & SODIUM PER 1 ML: Performed by: EMERGENCY MEDICINE

## 2018-04-04 PROCEDURE — 85025 COMPLETE CBC W/AUTO DIFF WBC: CPT | Performed by: EMERGENCY MEDICINE

## 2018-04-04 PROCEDURE — 93005 ELECTROCARDIOGRAM TRACING: CPT | Performed by: EMERGENCY MEDICINE

## 2018-04-04 PROCEDURE — 25010000002 IOPAMIDOL 61 % SOLUTION: Performed by: EMERGENCY MEDICINE

## 2018-04-04 PROCEDURE — 80306 DRUG TEST PRSMV INSTRMNT: CPT | Performed by: EMERGENCY MEDICINE

## 2018-04-04 PROCEDURE — 71045 X-RAY EXAM CHEST 1 VIEW: CPT

## 2018-04-04 PROCEDURE — 83880 ASSAY OF NATRIURETIC PEPTIDE: CPT | Performed by: EMERGENCY MEDICINE

## 2018-04-04 PROCEDURE — 83690 ASSAY OF LIPASE: CPT | Performed by: EMERGENCY MEDICINE

## 2018-04-04 PROCEDURE — 84484 ASSAY OF TROPONIN QUANT: CPT

## 2018-04-04 PROCEDURE — 85610 PROTHROMBIN TIME: CPT | Performed by: EMERGENCY MEDICINE

## 2018-04-04 PROCEDURE — 71275 CT ANGIOGRAPHY CHEST: CPT

## 2018-04-04 PROCEDURE — 85730 THROMBOPLASTIN TIME PARTIAL: CPT | Performed by: EMERGENCY MEDICINE

## 2018-04-04 RX ORDER — HYDROMORPHONE HYDROCHLORIDE 1 MG/ML
0.5 INJECTION, SOLUTION INTRAMUSCULAR; INTRAVENOUS; SUBCUTANEOUS ONCE
Status: COMPLETED | OUTPATIENT
Start: 2018-04-04 | End: 2018-04-04

## 2018-04-04 RX ORDER — FAMOTIDINE 20 MG/1
40 TABLET, FILM COATED ORAL ONCE
Status: COMPLETED | OUTPATIENT
Start: 2018-04-04 | End: 2018-04-04

## 2018-04-04 RX ORDER — ASPIRIN 81 MG/1
81 TABLET ORAL DAILY
Qty: 30 TABLET | Refills: 0 | Status: SHIPPED | OUTPATIENT
Start: 2018-04-04

## 2018-04-04 RX ORDER — ALUMINA, MAGNESIA, AND SIMETHICONE 2400; 2400; 240 MG/30ML; MG/30ML; MG/30ML
15 SUSPENSION ORAL ONCE
Status: COMPLETED | OUTPATIENT
Start: 2018-04-04 | End: 2018-04-04

## 2018-04-04 RX ORDER — SUCRALFATE 1 G/1
2 TABLET ORAL ONCE
Status: COMPLETED | OUTPATIENT
Start: 2018-04-04 | End: 2018-04-04

## 2018-04-04 RX ORDER — FAMOTIDINE 40 MG/1
40 TABLET, FILM COATED ORAL EVERY MORNING
Qty: 14 TABLET | Refills: 0 | Status: SHIPPED | OUTPATIENT
Start: 2018-04-04 | End: 2018-11-26

## 2018-04-04 RX ORDER — PANTOPRAZOLE SODIUM 40 MG/10ML
40 INJECTION, POWDER, LYOPHILIZED, FOR SOLUTION INTRAVENOUS ONCE
Status: COMPLETED | OUTPATIENT
Start: 2018-04-04 | End: 2018-04-04

## 2018-04-04 RX ORDER — NITROGLYCERIN 0.4 MG/1
0.4 TABLET SUBLINGUAL
Qty: 100 TABLET | Refills: 0 | Status: SHIPPED | OUTPATIENT
Start: 2018-04-04 | End: 2021-04-13 | Stop reason: SDUPTHER

## 2018-04-04 RX ORDER — SODIUM CHLORIDE 9 MG/ML
125 INJECTION, SOLUTION INTRAVENOUS CONTINUOUS
Status: DISCONTINUED | OUTPATIENT
Start: 2018-04-04 | End: 2018-04-04 | Stop reason: HOSPADM

## 2018-04-04 RX ORDER — OMEPRAZOLE 20 MG/1
20 CAPSULE, DELAYED RELEASE ORAL
Qty: 14 CAPSULE | Refills: 0 | Status: SHIPPED | OUTPATIENT
Start: 2018-04-04 | End: 2018-08-01 | Stop reason: SDUPTHER

## 2018-04-04 RX ORDER — SODIUM CHLORIDE 0.9 % (FLUSH) 0.9 %
10 SYRINGE (ML) INJECTION AS NEEDED
Status: DISCONTINUED | OUTPATIENT
Start: 2018-04-04 | End: 2018-04-04 | Stop reason: HOSPADM

## 2018-04-04 RX ORDER — ONDANSETRON 2 MG/ML
4 INJECTION INTRAMUSCULAR; INTRAVENOUS ONCE
Status: COMPLETED | OUTPATIENT
Start: 2018-04-04 | End: 2018-04-04

## 2018-04-04 RX ADMIN — FAMOTIDINE 40 MG: 20 TABLET, FILM COATED ORAL at 15:06

## 2018-04-04 RX ADMIN — HYDROMORPHONE HYDROCHLORIDE 0.5 MG: 10 INJECTION INTRAMUSCULAR; INTRAVENOUS; SUBCUTANEOUS at 15:04

## 2018-04-04 RX ADMIN — ALUMINUM HYDROXIDE, MAGNESIUM HYDROXIDE, AND DIMETHICONE 15 ML: 400; 400; 40 SUSPENSION ORAL at 11:50

## 2018-04-04 RX ADMIN — PANTOPRAZOLE SODIUM 40 MG: 40 INJECTION, POWDER, FOR SOLUTION INTRAVENOUS at 10:38

## 2018-04-04 RX ADMIN — THIAMINE HYDROCHLORIDE 1000 ML/HR: 100 INJECTION, SOLUTION INTRAMUSCULAR; INTRAVENOUS at 11:04

## 2018-04-04 RX ADMIN — SUCRALFATE 2 G: 1 TABLET ORAL at 15:23

## 2018-04-04 RX ADMIN — LIDOCAINE HYDROCHLORIDE 15 ML: 20 SOLUTION ORAL; TOPICAL at 11:51

## 2018-04-04 RX ADMIN — HYDROMORPHONE HYDROCHLORIDE 0.5 MG: 10 INJECTION INTRAMUSCULAR; INTRAVENOUS; SUBCUTANEOUS at 10:39

## 2018-04-04 RX ADMIN — ONDANSETRON 4 MG: 2 INJECTION INTRAMUSCULAR; INTRAVENOUS at 10:37

## 2018-04-04 RX ADMIN — Medication 15 ML: at 11:47

## 2018-04-04 RX ADMIN — IOPAMIDOL 100 ML: 612 INJECTION, SOLUTION INTRAVENOUS at 13:06

## 2018-04-04 RX ADMIN — SODIUM CHLORIDE 500 ML: 9 INJECTION, SOLUTION INTRAVENOUS at 10:27

## 2018-04-04 NOTE — ED PROVIDER NOTES
Subjective   Sera Guidry is a 52 y.o.female who presents to the ED with c/o chest pain. Fifteen minutes prior to arrival the patient suddenly developed sharp left sided chest pain while speaking with her . EMS was immediately called to bring her to the ED for evaluation. En route she was given 324 ASA and 2x NTG without relief. She vomited one time en route. At the ED she states that she has a chronic cough and congestion, secondary to smoking cigarettes but denies abnormal cold symptoms, shortness of breath or any other acute symptoms.    History of alcohol abuse with a reported two beers this morning.        History provided by:  Patient and EMS personnel  Chest Pain   Pain location:  L chest  Pain radiates to:  Does not radiate  Onset quality:  Sudden  Duration:  15 minutes  Timing:  Constant  Progression:  Unchanged  Chronicity:  New  Relieved by:  None tried  Worsened by:  Nothing  Ineffective treatments:  None tried  Associated symptoms: cough (chronic), nausea and vomiting    Associated symptoms: no abdominal pain, no back pain, no fever and no shortness of breath        Review of Systems   Constitutional: Negative for fever.   HENT: Positive for congestion (chronic).    Respiratory: Positive for cough (chronic). Negative for shortness of breath.    Cardiovascular: Positive for chest pain.   Gastrointestinal: Positive for nausea and vomiting. Negative for abdominal pain.   Musculoskeletal: Negative for back pain.   All other systems reviewed and are negative.      Past Medical History:   Diagnosis Date   • Allergy    • Anxiety    • Depression    • Ovarian cancer     endometrial/cervical   • Ovarian cyst        Allergies   Allergen Reactions   • Penicillins Hives       Past Surgical History:   Procedure Laterality Date   • APPENDECTOMY     • HYSTERECTOMY     • ORIF WRIST FRACTURE Left 09/2017    with 2 plates   • TOTAL ABDOMINAL HYSTERECTOMY WITH SALPINGO OOPHORECTOMY      ovaries removed at 2  separate occcasions       Family History   Problem Relation Age of Onset   • Allergies Other    • ADD / ADHD Other    • Heart disease Other      cardiac disorder   • Stroke Other    • Depression Other    • Hypertension Other    • Lung cancer Other    • Uterine cancer Other    • Ovarian cancer Other    • Cancer Mother      left lung   • Stroke Mother      x 2   • Stroke Father      x 3   • Heart disease Father      heart attack   • No Known Problems Son    • No Known Problems Son    • No Known Problems Son        Social History     Social History   • Marital status:      Social History Main Topics   • Smoking status: Current Every Day Smoker     Packs/day: 0.50     Years: 40.00     Types: Cigarettes   • Smokeless tobacco: Never Used   • Alcohol use No      Comment: quit   • Drug use: No      Comment: quit MJ   • Sexual activity: Defer     Other Topics Concern   • Not on file         Objective   Physical Exam   Constitutional: She is oriented to person, place, and time. She appears well-developed and well-nourished. No distress.   HENT:   Head: Normocephalic and atraumatic.   Mouth/Throat: Oropharynx is clear and moist. No oropharyngeal exudate.   EtOH on breath.   Eyes: Conjunctivae are normal. No scleral icterus.   Neck: Normal range of motion. Neck supple. No JVD present.   Cardiovascular: Normal rate, regular rhythm and normal heart sounds.  Exam reveals no gallop and no friction rub.    No murmur heard.  Pulmonary/Chest: Effort normal and breath sounds normal. No respiratory distress. She has no wheezes. She has no rales. She exhibits tenderness.   Abdominal: Soft. Bowel sounds are normal. She exhibits no distension. There is tenderness. There is no rebound and no guarding.   Epigastric TTP that appears to reproduce symptoms. Remainder of the abdomen non tender.   Musculoskeletal: Normal range of motion. She exhibits no edema.   Poorly reproducible chest wall TTP. Moves all four extremities.    Neurological: She is alert and oriented to person, place, and time.   Slurred speech.   Skin: Skin is warm and dry. She is not diaphoretic.   Psychiatric: She has a normal mood and affect. Her behavior is normal.   Nursing note and vitals reviewed.      Procedures         ED Course  ED Course   Comment By Time   Dr. Brown re-evaluated the patient and discussed all findings. Octavio Dickerson 04/04 1107   Dr. Brown at bedside with the patient. All findings are discussed. Octavio Jayla 04/04 1409   She does serially reevaluated throughout the ED course with last reevaluation now.  She progressively improved though improved most with IV Protonix and an oral GI cocktail.  EKGs were nonacute, and troponins negative.CT of the chest revealed no PE or other acute intrathoracic disease including aortic disease.  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  Otherwise results as per chartI discussed the findings at length with patient.  She is now sitting up very comfortable and cooperative.  I discussed evaluation, intoxication, gastritis, and reflux, and need for absolute ethanol and tobacco cessation.  I discussed her vascular disease, and she is markedly improved throughout the ED course.I discussed needed follow-up with gastroenterology Darwin Brown MD 04/04 8043     Recent Results (from the past 24 hour(s))   POC Troponin, Rapid    Collection Time: 04/04/18 10:18 AM   Result Value Ref Range    Troponin I 0.00 0.00 - 0.07 ng/mL   Comprehensive Metabolic Panel    Collection Time: 04/04/18 10:21 AM   Result Value Ref Range    Glucose 95 70 - 100 mg/dL    BUN 11 9 - 23 mg/dL    Creatinine 0.50 (L) 0.60 - 1.30 mg/dL    Sodium 135 132 - 146 mmol/L    Potassium 4.3 3.5 - 5.5 mmol/L    Chloride 99 99 - 109 mmol/L    CO2 22.0 20.0 - 31.0 mmol/L    Calcium 9.3 8.7 - 10.4 mg/dL    Total Protein 7.2 5.7 - 8.2 g/dL    Albumin 4.40 3.20 - 4.80 g/dL    ALT  (SGPT) 24 7 - 40 U/L    AST (SGOT) 24 0 - 33 U/L    Alkaline Phosphatase 99 25 - 100 U/L    Total Bilirubin 0.3 0.3 - 1.2 mg/dL    eGFR Non African Amer 129 >60 mL/min/1.73    Globulin 2.8 gm/dL    A/G Ratio 1.6 1.5 - 2.5 g/dL    BUN/Creatinine Ratio 22.0 7.0 - 25.0    Anion Gap 14.0 (H) 3.0 - 11.0 mmol/L   Protime-INR    Collection Time: 04/04/18 10:21 AM   Result Value Ref Range    Protime 10.0 9.6 - 11.5 Seconds    INR 0.95 0.91 - 1.09   Lipase    Collection Time: 04/04/18 10:21 AM   Result Value Ref Range    Lipase 30 6 - 51 U/L   aPTT    Collection Time: 04/04/18 10:21 AM   Result Value Ref Range    PTT 35.1 (H) 24.0 - 31.0 seconds   Ethanol    Collection Time: 04/04/18 10:21 AM   Result Value Ref Range    Ethanol 233 (C) 0 - 10 mg/dL   Magnesium    Collection Time: 04/04/18 10:21 AM   Result Value Ref Range    Magnesium 2.2 1.3 - 2.7 mg/dL   Light Blue Top    Collection Time: 04/04/18 10:21 AM   Result Value Ref Range    Extra Tube hold for add-on    Green Top (Gel)    Collection Time: 04/04/18 10:21 AM   Result Value Ref Range    Extra Tube Hold for add-ons.    Lavender Top    Collection Time: 04/04/18 10:21 AM   Result Value Ref Range    Extra Tube hold for add-on    Gold Top - SST    Collection Time: 04/04/18 10:21 AM   Result Value Ref Range    Extra Tube Hold for add-ons.    CBC Auto Differential    Collection Time: 04/04/18 10:21 AM   Result Value Ref Range    WBC 12.25 (H) 3.50 - 10.80 10*3/mm3    RBC 4.71 3.89 - 5.14 10*6/mm3    Hemoglobin 15.1 11.5 - 15.5 g/dL    Hematocrit 44.3 (H) 34.5 - 44.0 %    MCV 94.1 80.0 - 99.0 fL    MCH 32.1 (H) 27.0 - 31.0 pg    MCHC 34.1 32.0 - 36.0 g/dL    RDW 13.0 11.3 - 14.5 %    RDW-SD 45.0 37.0 - 54.0 fl    MPV 9.5 6.0 - 12.0 fL    Platelets 307 150 - 450 10*3/mm3    Neutrophil % 61.3 41.0 - 71.0 %    Lymphocyte % 30.5 24.0 - 44.0 %    Monocyte % 5.8 0.0 - 12.0 %    Eosinophil % 1.8 0.0 - 3.0 %    Basophil % 0.4 0.0 - 1.0 %    Immature Grans % 0.2 0.0 - 0.6 %     Neutrophils, Absolute 7.50 1.50 - 8.30 10*3/mm3    Lymphocytes, Absolute 3.74 0.60 - 4.80 10*3/mm3    Monocytes, Absolute 0.71 0.00 - 1.00 10*3/mm3    Eosinophils, Absolute 0.22 0.00 - 0.30 10*3/mm3    Basophils, Absolute 0.05 0.00 - 0.20 10*3/mm3    Immature Grans, Absolute 0.03 0.00 - 0.03 10*3/mm3   BNP    Collection Time: 04/04/18 10:21 AM   Result Value Ref Range    BNP 28.0 0.0 - 100.0 pg/mL   Urine Drug Screen - Urine, Clean Catch    Collection Time: 04/04/18 10:30 AM   Result Value Ref Range    THC, Screen, Urine Negative Negative    Phencyclidine (PCP), Urine Negative Negative    Cocaine Screen, Urine Negative Negative    Methamphetamine, Urine Negative Negative    Opiate Screen Negative Negative    Amphetamine Screen, Urine Negative Negative    Benzodiazepine Screen, Urine Negative Negative    Tricyclic Antidepressants Screen Negative Negative    Methadone Screen, Urine Negative Negative    Barbiturates Screen, Urine Negative Negative    Oxycodone Screen, Urine Negative Negative    Propoxyphene Screen Negative Negative    Buprenorphine, Screen, Urine Negative Negative   Urinalysis With / Microscopic If Indicated - Urine, Clean Catch    Collection Time: 04/04/18 10:30 AM   Result Value Ref Range    Color, UA Yellow Yellow, Straw    Appearance, UA Clear Clear    pH, UA 5.5 5.0 - 8.0    Specific Gravity, UA <=1.005 1.001 - 1.030    Glucose, UA Negative Negative    Ketones, UA Negative Negative    Bilirubin, UA Negative Negative    Blood, UA Negative Negative    Protein, UA Negative Negative    Leuk Esterase, UA Negative Negative    Nitrite, UA Negative Negative    Urobilinogen, UA 0.2 E.U./dL 0.2 - 1.0 E.U./dL   POC Troponin, Rapid    Collection Time: 04/04/18 12:32 PM   Result Value Ref Range    Troponin I 0.01 0.00 - 0.07 ng/mL     Note: In addition to lab results from this visit, the labs listed above may include labs taken at another facility or during a different encounter within the last 24 hours.  "Please correlate lab times with ED admission and discharge times for further clarification of the services performed during this visit.    XR Chest 1 View   Preliminary Result   No evidence of active chest disease.       D:  04/04/2018   E:  04/04/2018                  CT Abdomen Pelvis With Contrast   Preliminary Result   1. No PE.   2. No acute intrathoracic findings.   3. 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              CT Angiogram Chest With Contrast   Preliminary Result   1. No PE.   2. No acute intrathoracic findings.   3. 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                Vitals:    04/04/18 0953 04/04/18 1051 04/04/18 1158 04/04/18 1244   BP: (!) 178/102 156/84 172/98    BP Location:  Left arm     Patient Position: Lying Lying     Pulse: 73 65 65 62   Resp: 17  16    Temp: 98.2 °F (36.8 °C)      TempSrc: Oral      SpO2: 94% 95% 98% 95%   Weight: 54.4 kg (120 lb)      Height: 165.1 cm (65\")        Medications   sodium chloride 0.9 % flush 10 mL (not administered)   sodium chloride 0.9 % flush 10 mL (not administered)   sodium chloride 0.9 % infusion (not administered)   famotidine (PEPCID) tablet 40 mg (not administered)   HYDROmorphone (DILAUDID) injection 0.5 mg (not administered)   sucralfate (CARAFATE) tablet 2 g (not administered)   sodium chloride 0.9 % bolus 500 mL (500 mL Intravenous New Bag 4/4/18 1027)   multiple vitamin (M.V.I. Adult) 10 mL, thiamine (B-1) 100 mg, folic acid 1 mg in sodium chloride 0.9 % 1,000 mL infusion (1,000 mL/hr Intravenous New Bag 4/4/18 1104)   ondansetron (ZOFRAN) injection 4 mg (4 mg Intravenous Given 4/4/18 1037)   pantoprazole (PROTONIX) injection 40 mg (40 mg Intravenous Given 4/4/18 1038)   HYDROmorphone (DILAUDID) injection 0.5 mg (0.5 mg Intravenous Given 4/4/18 1039)   aluminum-magnesium hydroxide-simethicone (MAALOX " MAX) 400-400-40 MG/5ML suspension 15 mL (15 mL Oral Given 4/4/18 1150)   lidocaine viscous (XYLOCAINE) 2 % mouth solution 15 mL (15 mL Mouth/Throat Given 4/4/18 1151)   diatrizoate meglumine-sodium (GASTROGRAFIN) 66-10 % solution 15 mL (15 mL Oral Given 4/4/18 1147)   iopamidol (ISOVUE-300) 61 % injection 100 mL (100 mL Intravenous Given 4/4/18 1306)     ECG/EMG Results (last 24 hours)     Procedure Component Value Units Date/Time    ECG 12 Lead [[id number]] Collected:  04/04/18 0951     Updated:  04/04/18 0959              HEART Score (for prediction of 6-week risk of major adverse cardiac event) reviewed and/or performed as part of the patient evaluation and treatment planning process.  The result associated with this review/performance is: 1           MDM    Final diagnoses:   Epigastric pain   Atypical chest pain   Alcoholic intoxication with complication   Gastritis without bleeding, unspecified chronicity, unspecified gastritis type   Tobacco abuse   Vascular disease       Documentation assistance provided by morro Dickerson.  Information recorded by the morro was done at my direction and has been verified and validated by me.     Octavio Dickerson  04/04/18 1006       Octavio Dickerson  04/04/18 1425       Octavio Dickerson  04/04/18 1437       Darwin Brown MD  04/04/18 1435

## 2018-04-04 NOTE — DISCHARGE INSTRUCTIONS
Take Pepcid in the morning and Protonix at night, as discussed. Take Mylanta for acute exacerbations of symptoms but not around the time that you take Pepcid or Protonix.     Complete cessation of alcohol and tobacco, as discussed.    Follow up with the chest pain clinic as arranged. They should call you schedule an appointment. If you do not hear from them by tomorrow at noon, call to schedule an appointment.    Dr. Clarke in the office for evaluation of your iliac artery occlusion.  Call tomorrow for an appointment.  This was an incidental finding noted today.  This is another indication that you need to completely stop smoking    If you develop acute, progressive, emergent or urgent symptoms, as discussed, return to the emergency room for evaluation.    Please review the medications you are supposed to be taking according to prior physician recommendations. I have not changed your home medications during this visit. If your discharge instructions indicate that I have changed your home medications, this is not the case, and you should disregard. If you have any questions about the medication you should be taking at home, please call your physician.

## 2018-04-05 ENCOUNTER — TELEPHONE (OUTPATIENT)
Dept: INTERNAL MEDICINE | Facility: CLINIC | Age: 53
End: 2018-04-05

## 2018-04-10 ENCOUNTER — HOSPITAL ENCOUNTER (OUTPATIENT)
Dept: CARDIOLOGY | Facility: HOSPITAL | Age: 53
Discharge: HOME | End: 2018-04-10
Admitting: NURSE PRACTITIONER

## 2018-04-10 ENCOUNTER — OFFICE VISIT (OUTPATIENT)
Dept: CARDIOLOGY | Facility: HOSPITAL | Age: 53
End: 2018-04-10

## 2018-04-10 VITALS
WEIGHT: 116.4 LBS | RESPIRATION RATE: 16 BRPM | BODY MASS INDEX: 18.71 KG/M2 | OXYGEN SATURATION: 99 % | HEART RATE: 74 BPM | HEIGHT: 66 IN | DIASTOLIC BLOOD PRESSURE: 100 MMHG | SYSTOLIC BLOOD PRESSURE: 161 MMHG | TEMPERATURE: 98.5 F

## 2018-04-10 DIAGNOSIS — Z72.0 TOBACCO USE: ICD-10-CM

## 2018-04-10 DIAGNOSIS — R07.9 CHEST PAIN, UNSPECIFIED TYPE: Primary | ICD-10-CM

## 2018-04-10 DIAGNOSIS — I73.9 PVD (PERIPHERAL VASCULAR DISEASE) WITH CLAUDICATION (HCC): ICD-10-CM

## 2018-04-10 DIAGNOSIS — R07.9 CHEST PAIN, UNSPECIFIED TYPE: ICD-10-CM

## 2018-04-10 DIAGNOSIS — E78.2 MIXED HYPERLIPIDEMIA: ICD-10-CM

## 2018-04-10 PROCEDURE — 99214 OFFICE O/P EST MOD 30 MIN: CPT | Performed by: NURSE PRACTITIONER

## 2018-04-10 PROCEDURE — 93005 ELECTROCARDIOGRAM TRACING: CPT | Performed by: NURSE PRACTITIONER

## 2018-04-10 NOTE — PROGRESS NOTES
[facility]  [facility]  Chest Pain Clinic    Encounter Date:04/10/2018     Sera Guidry  [address] [address]  [phone]    1965    Pooja Perdomo MD    Sera Guidry is a 52 y.o. female.      Subjective:     Chief Complaint:  Establish Care (Chest pain, s/p ED)       HPI : Ms. Guidry presents today to the Chest Pain Clinic at the request of Dr. Brown.  She presented to the ED via EMS for sudden onset left sided chest pain 15 minutes prior to arrival.      Since ER visit she had one episode of left sided chest pain stabbing (not as severe or long lasting as the one which brought her to ED).      Cardiac risk factors: tobacco use, age >50, hyperlipidemia  History of CVD, dyslipidemia, hypertension, diabetes.  Tobacco use, sedentary lifestyle, obesity (BMI > 30), gender, age (>50)  Family history of premature coronary artery disease (male < 55 yrs, female <66 yrs)    CLAUDIO score is 2    Allergies   Allergen Reactions   • Penicillins Hives         Current Outpatient Prescriptions:   •  vitamin E 200 UNIT capsule, Take 200 Units by mouth Daily., Disp: , Rfl:   •  aspirin 81 MG EC tablet, Take 1 tablet by mouth Daily., Disp: 30 tablet, Rfl: 0  •  busPIRone (BUSPAR) 7.5 MG tablet, Take 1 tablet by mouth Every Night., Disp: 90 tablet, Rfl: 1  •  Cholecalciferol (VITAMIN D PO), Take 1,000 Units by mouth Daily., Disp: , Rfl:   •  citalopram (CeleXA) 10 MG tablet, Take 1 tablet by mouth Daily., Disp: 30 tablet, Rfl: 3  •  famotidine (PEPCID) 40 MG tablet, Take 1 tablet by mouth Every Morning., Disp: 14 tablet, Rfl: 0  •  Multiple Vitamins-Minerals (MULTIVITAMIN WOMEN PO), Take 1 tablet by mouth Daily., Disp: , Rfl:   •  nitroglycerin (NITROSTAT) 0.4 MG SL tablet, Place 1 tablet under the tongue Every 5 (Five) Minutes As Needed for Chest Pain. Take no more than 3 doses in 15 minutes., Disp: 100 tablet, Rfl: 0  •  omeprazole (PRILOSEC) 20 MG capsule,  Take 1 capsule by mouth every night at bedtime., Disp: 14 capsule, Rfl: 0  •  tiZANidine (ZANAFLEX) 4 MG tablet, Take 1 tablet by mouth 2 (Two) Times a Day As Needed for Muscle Spasms., Disp: 60 tablet, Rfl: 3  •  vitamin B-12 (CYANOCOBALAMIN) 500 MCG tablet, Take 500 mcg by mouth Daily., Disp: , Rfl:     The following portions of the patient's history were reviewed and updated as appropriate in Epic:  Problem list, allergies, current medications, past medical and surgical history, past social and family history.     Review of Systems   Constitution: Positive for weakness and malaise/fatigue. Negative for chills, decreased appetite, diaphoresis, fever, night sweats, weight gain and weight loss.   HENT: Positive for congestion. Negative for hearing loss, hoarse voice and nosebleeds.    Eyes: Negative for blurred vision, visual disturbance and visual halos.   Cardiovascular: Positive for chest pain, claudication, irregular heartbeat, leg swelling, near-syncope and palpitations. Negative for cyanosis, dyspnea on exertion, orthopnea, paroxysmal nocturnal dyspnea and syncope.   Respiratory: Positive for cough and wheezing. Negative for hemoptysis, shortness of breath, sleep disturbances due to breathing, snoring and sputum production.    Endocrine: Positive for cold intolerance and heat intolerance.   Hematologic/Lymphatic: Negative for bleeding problem. Bruises/bleeds easily.   Skin: Negative for dry skin, itching and rash.   Musculoskeletal: Positive for joint pain, muscle cramps and muscle weakness. Negative for arthritis, joint swelling and myalgias.   Gastrointestinal: Positive for abdominal pain. Negative for bloating, constipation, diarrhea, flatus, heartburn, hematemesis, hematochezia, melena, nausea and vomiting.   Genitourinary: Negative for dysuria, frequency, hematuria, nocturia and urgency.   Neurological: Positive for dizziness, headaches and light-headedness. Negative for excessive daytime sleepiness and  "loss of balance.   Psychiatric/Behavioral: Positive for altered mental status, depression and memory loss. The patient has insomnia and is nervous/anxious.    Allergic/Immunologic:        Seasonal allergies         Objective:     Vitals:    04/10/18 0936 04/10/18 0938 04/10/18 0939   BP: 177/85 174/87 161/100   BP Location: Right arm Left arm Left arm   Patient Position: Sitting Sitting Standing   Pulse: 69  74   Resp: 16     Temp: 98.5 °F (36.9 °C)     TempSrc: Temporal Artery      SpO2: 99%     Weight: 52.8 kg (116 lb 6.4 oz)     Height: 167.6 cm (66\")           Physical Exam   Constitutional: She is oriented to person, place, and time. She appears well-developed and well-nourished. No distress.   HENT:   Head: Normocephalic and atraumatic.   Eyes: Conjunctivae are normal. Pupils are equal, round, and reactive to light. No scleral icterus.   Neck: Neck supple. No JVD present.   No carotid bruit   Cardiovascular: Normal rate, regular rhythm and intact distal pulses.    No murmur heard.  Split S1,S2   Pulmonary/Chest: Effort normal. No respiratory distress. She has wheezes. She has no rales. She exhibits no tenderness.   End expiratory wheezing bilaterally   Musculoskeletal: Normal range of motion. She exhibits no edema.   Neurological: She is alert and oriented to person, place, and time. No cranial nerve deficit.   Skin: Skin is warm and dry.   Psychiatric: She has a normal mood and affect. Her behavior is normal.       Lab and Diagnostic Review:  ER notes, labs, EKG's, CT study    Assessment and Plan:     1. Chest pain, unspecified type  - CAD risk factors noted above.    - ECG 12 Lead remains NSR  - Stress Test With Myocardial Perfusion (1 Day); Future (she is not fasting today)  Needs Lexiscan due to PVD.  - Adult Transthoracic Echo Complete W/ Cont if Necessary Per Protocol to assess LV and valvular function (father had hx of valvular disease.    2. Tobacco use  - Long discussion regarding benefits of quitting " in regard to PVD especially.  - Patient is motivated to quit and has patches at home.  She is agreeable to set a quit date and start using the patches.    3. PVD (peripheral vascular disease) with claudication  - CT report reviewed.    - Pedal pulses remain palpable but she does endorse claudication symptoms.    - Scheduled with Dr. Clarke later this month.    4. Mixed hyperlipidemia  - Hx of.  Check lipids when she returns for GXT.      Will follow up with Ms. Guidry once Cardiolite and echo results received and determine appropriate plans.        *Please note that portions of this note were completed with a voice recognition program. Efforts were made to edit the dictations, but occasionally words are mistranscribed.

## 2018-04-17 ENCOUNTER — HOSPITAL ENCOUNTER (OUTPATIENT)
Dept: CARDIOLOGY | Facility: HOSPITAL | Age: 53
Discharge: HOME | End: 2018-04-17
Admitting: NURSE PRACTITIONER

## 2018-04-17 VITALS — HEIGHT: 66 IN | BODY MASS INDEX: 18.64 KG/M2 | WEIGHT: 116 LBS

## 2018-04-17 DIAGNOSIS — R07.9 CHEST PAIN, UNSPECIFIED TYPE: ICD-10-CM

## 2018-04-17 DIAGNOSIS — I73.9 PVD (PERIPHERAL VASCULAR DISEASE) WITH CLAUDICATION (HCC): ICD-10-CM

## 2018-04-17 DIAGNOSIS — E78.2 MIXED HYPERLIPIDEMIA: ICD-10-CM

## 2018-04-17 DIAGNOSIS — Z72.0 TOBACCO USE: ICD-10-CM

## 2018-04-17 LAB
BH CV ECHO MEAS - AO MAX PG (FULL): 2.5 MMHG
BH CV ECHO MEAS - AO MAX PG: 9 MMHG
BH CV ECHO MEAS - AO ROOT AREA (BSA CORRECTED): 1.7
BH CV ECHO MEAS - AO ROOT AREA: 5.9 CM^2
BH CV ECHO MEAS - AO ROOT DIAM: 2.8 CM
BH CV ECHO MEAS - AO V2 MAX: 147.2 CM/SEC
BH CV ECHO MEAS - AVA(V,A): 2.6 CM^2
BH CV ECHO MEAS - AVA(V,D): 2.6 CM^2
BH CV ECHO MEAS - BSA(HAYCOCK): 1.6 M^2
BH CV ECHO MEAS - BSA: 1.6 M^2
BH CV ECHO MEAS - BZI_BMI: 18.7 KILOGRAMS/M^2
BH CV ECHO MEAS - BZI_METRIC_HEIGHT: 167.6 CM
BH CV ECHO MEAS - BZI_METRIC_WEIGHT: 52.6 KG
BH CV ECHO MEAS - CONTRAST EF (2CH): 67.3 ML/M^2
BH CV ECHO MEAS - CONTRAST EF 4CH: 64.1 ML/M^2
BH CV ECHO MEAS - EDV(CUBED): 79.1 ML
BH CV ECHO MEAS - EDV(MOD-SP2): 55 ML
BH CV ECHO MEAS - EDV(MOD-SP4): 64 ML
BH CV ECHO MEAS - EDV(TEICH): 82.7 ML
BH CV ECHO MEAS - EF(CUBED): 76.5 %
BH CV ECHO MEAS - EF(MOD-BP): 64 %
BH CV ECHO MEAS - EF(MOD-SP2): 67.3 %
BH CV ECHO MEAS - EF(MOD-SP4): 64.1 %
BH CV ECHO MEAS - EF(TEICH): 68.9 %
BH CV ECHO MEAS - ESV(CUBED): 18.6 ML
BH CV ECHO MEAS - ESV(MOD-SP2): 18 ML
BH CV ECHO MEAS - ESV(MOD-SP4): 23 ML
BH CV ECHO MEAS - ESV(TEICH): 25.8 ML
BH CV ECHO MEAS - FS: 38.3 %
BH CV ECHO MEAS - IVS/LVPW: 0.85
BH CV ECHO MEAS - IVSD: 0.93 CM
BH CV ECHO MEAS - LA DIMENSION: 2.7 CM
BH CV ECHO MEAS - LA/AO: 0.98
BH CV ECHO MEAS - LAT PEAK E' VEL: 13.9 CM/SEC
BH CV ECHO MEAS - LV DIASTOLIC VOL/BSA (35-75): 40.3 ML/M^2
BH CV ECHO MEAS - LV MASS(C)D: 144.9 GRAMS
BH CV ECHO MEAS - LV MASS(C)DI: 91.3 GRAMS/M^2
BH CV ECHO MEAS - LV MAX PG: 6.5 MMHG
BH CV ECHO MEAS - LV MEAN PG: 2.7 MMHG
BH CV ECHO MEAS - LV SYSTOLIC VOL/BSA (12-30): 14.5 ML/M^2
BH CV ECHO MEAS - LV V1 MAX: 127.2 CM/SEC
BH CV ECHO MEAS - LV V1 MEAN: 75.4 CM/SEC
BH CV ECHO MEAS - LV V1 VTI: 27.1 CM
BH CV ECHO MEAS - LVIDD: 4.3 CM
BH CV ECHO MEAS - LVIDS: 2.6 CM
BH CV ECHO MEAS - LVLD AP2: 7.3 CM
BH CV ECHO MEAS - LVLD AP4: 7.4 CM
BH CV ECHO MEAS - LVLS AP2: 5.9 CM
BH CV ECHO MEAS - LVLS AP4: 5.6 CM
BH CV ECHO MEAS - LVOT AREA (M): 3.1 CM^2
BH CV ECHO MEAS - LVOT AREA: 3 CM^2
BH CV ECHO MEAS - LVOT DIAM: 2 CM
BH CV ECHO MEAS - LVPWD: 1.1 CM
BH CV ECHO MEAS - MED PEAK E' VEL: 10.5 CM/SEC
BH CV ECHO MEAS - MV A MAX VEL: 64.5 CM/SEC
BH CV ECHO MEAS - MV DEC TIME: 0.25 SEC
BH CV ECHO MEAS - MV E MAX VEL: 88.3 CM/SEC
BH CV ECHO MEAS - MV E/A: 1.4
BH CV ECHO MEAS - PA ACC SLOPE: 360.8 CM/SEC^2
BH CV ECHO MEAS - PA ACC TIME: 0.2 SEC
BH CV ECHO MEAS - PA MAX PG: 4.3 MMHG
BH CV ECHO MEAS - PA PR(ACCEL): -8.9 MMHG
BH CV ECHO MEAS - PA V2 MAX: 103.2 CM/SEC
BH CV ECHO MEAS - PI END-D VEL: 126 CM/SEC
BH CV ECHO MEAS - RAP SYSTOLE: 3 MMHG
BH CV ECHO MEAS - RVDD: 2.2 CM
BH CV ECHO MEAS - RVSP: 18 MMHG
BH CV ECHO MEAS - SI(CUBED): 38.2 ML/M^2
BH CV ECHO MEAS - SI(LVOT): 52 ML/M^2
BH CV ECHO MEAS - SI(MOD-SP2): 23.3 ML/M^2
BH CV ECHO MEAS - SI(MOD-SP4): 25.8 ML/M^2
BH CV ECHO MEAS - SI(TEICH): 35.9 ML/M^2
BH CV ECHO MEAS - SV(CUBED): 60.5 ML
BH CV ECHO MEAS - SV(LVOT): 82.5 ML
BH CV ECHO MEAS - SV(MOD-SP2): 37 ML
BH CV ECHO MEAS - SV(MOD-SP4): 41 ML
BH CV ECHO MEAS - SV(TEICH): 57 ML
BH CV ECHO MEAS - TAPSE (>1.6): 1.9 CM2
BH CV ECHO MEAS - TR MAX V: 15 MMHG
BH CV ECHO MEAS - TR MAX VEL: 197 CM/SEC
BH CV ECHO MEASUREMENTS AVERAGE E/E' RATIO: 7
BH CV VAS BP LEFT ARM: NORMAL MMHG
BH CV XLRA - TDI S': 13.8 CM/SEC
LEFT ATRIUM VOLUME INDEX: 28 ML/M2
LEFT ATRIUM VOLUME: 45 CM3
LV EF 2D ECHO EST: 60 %
MAXIMAL PREDICTED HEART RATE: 167 BPM
STRESS TARGET HR: 142 BPM

## 2018-04-17 PROCEDURE — 93306 TTE W/DOPPLER COMPLETE: CPT

## 2018-04-17 PROCEDURE — 93306 TTE W/DOPPLER COMPLETE: CPT | Performed by: INTERNAL MEDICINE

## 2018-07-03 ENCOUNTER — APPOINTMENT (OUTPATIENT)
Dept: GENERAL RADIOLOGY | Facility: HOSPITAL | Age: 53
End: 2018-07-03

## 2018-07-03 ENCOUNTER — HOSPITAL ENCOUNTER (EMERGENCY)
Facility: HOSPITAL | Age: 53
Discharge: HOME | End: 2018-07-03
Attending: EMERGENCY MEDICINE | Admitting: EMERGENCY MEDICINE

## 2018-07-03 VITALS
HEART RATE: 55 BPM | DIASTOLIC BLOOD PRESSURE: 67 MMHG | OXYGEN SATURATION: 100 % | WEIGHT: 115 LBS | SYSTOLIC BLOOD PRESSURE: 123 MMHG | TEMPERATURE: 98.6 F | RESPIRATION RATE: 18 BRPM | HEIGHT: 66 IN | BODY MASS INDEX: 18.48 KG/M2

## 2018-07-03 DIAGNOSIS — F10.929 ALCOHOLIC INTOXICATION WITH COMPLICATION (HCC): ICD-10-CM

## 2018-07-03 DIAGNOSIS — F43.0 STRESS RESPONSE: ICD-10-CM

## 2018-07-03 DIAGNOSIS — R07.9 CHEST PAIN, UNSPECIFIED TYPE: ICD-10-CM

## 2018-07-03 DIAGNOSIS — K29.70 GASTRITIS WITHOUT BLEEDING, UNSPECIFIED CHRONICITY, UNSPECIFIED GASTRITIS TYPE: Primary | ICD-10-CM

## 2018-07-03 LAB
ALBUMIN SERPL-MCNC: 4.51 G/DL (ref 3.2–4.8)
ALBUMIN/GLOB SERPL: 1.6 G/DL (ref 1.5–2.5)
ALP SERPL-CCNC: 97 U/L (ref 25–100)
ALT SERPL W P-5'-P-CCNC: 18 U/L (ref 7–40)
ANION GAP SERPL CALCULATED.3IONS-SCNC: 15 MMOL/L (ref 3–11)
AST SERPL-CCNC: 23 U/L (ref 0–33)
BASOPHILS # BLD AUTO: 0.07 10*3/MM3 (ref 0–0.2)
BASOPHILS NFR BLD AUTO: 0.6 % (ref 0–1)
BILIRUB SERPL-MCNC: 0.3 MG/DL (ref 0.3–1.2)
BNP SERPL-MCNC: 25 PG/ML (ref 0–100)
BUN BLD-MCNC: 13 MG/DL (ref 9–23)
BUN/CREAT SERPL: 19.1 (ref 7–25)
CALCIUM SPEC-SCNC: 9.1 MG/DL (ref 8.7–10.4)
CHLORIDE SERPL-SCNC: 103 MMOL/L (ref 99–109)
CO2 SERPL-SCNC: 19 MMOL/L (ref 20–31)
CREAT BLD-MCNC: 0.68 MG/DL (ref 0.6–1.3)
DEPRECATED RDW RBC AUTO: 48.8 FL (ref 37–54)
EOSINOPHIL # BLD AUTO: 0.26 10*3/MM3 (ref 0–0.3)
EOSINOPHIL NFR BLD AUTO: 2.1 % (ref 0–3)
ERYTHROCYTE [DISTWIDTH] IN BLOOD BY AUTOMATED COUNT: 13.7 % (ref 11.3–14.5)
GFR SERPL CREATININE-BSD FRML MDRD: 91 ML/MIN/1.73
GLOBULIN UR ELPH-MCNC: 2.8 GM/DL
GLUCOSE BLD-MCNC: 97 MG/DL (ref 70–100)
HCT VFR BLD AUTO: 42.5 % (ref 34.5–44)
HGB BLD-MCNC: 14.5 G/DL (ref 11.5–15.5)
HOLD SPECIMEN: NORMAL
HOLD SPECIMEN: NORMAL
IMM GRANULOCYTES # BLD: 0.02 10*3/MM3 (ref 0–0.03)
IMM GRANULOCYTES NFR BLD: 0.2 % (ref 0–0.6)
LIPASE SERPL-CCNC: 32 U/L (ref 6–51)
LYMPHOCYTES # BLD AUTO: 4.51 10*3/MM3 (ref 0.6–4.8)
LYMPHOCYTES NFR BLD AUTO: 36.8 % (ref 24–44)
MCH RBC QN AUTO: 33 PG (ref 27–31)
MCHC RBC AUTO-ENTMCNC: 34.1 G/DL (ref 32–36)
MCV RBC AUTO: 96.6 FL (ref 80–99)
MONOCYTES # BLD AUTO: 0.79 10*3/MM3 (ref 0–1)
MONOCYTES NFR BLD AUTO: 6.4 % (ref 0–12)
NEUTROPHILS # BLD AUTO: 6.6 10*3/MM3 (ref 1.5–8.3)
NEUTROPHILS NFR BLD AUTO: 53.9 % (ref 41–71)
PLATELET # BLD AUTO: 293 10*3/MM3 (ref 150–450)
PMV BLD AUTO: 9.1 FL (ref 6–12)
POTASSIUM BLD-SCNC: 3.6 MMOL/L (ref 3.5–5.5)
PROT SERPL-MCNC: 7.3 G/DL (ref 5.7–8.2)
RBC # BLD AUTO: 4.4 10*6/MM3 (ref 3.89–5.14)
SODIUM BLD-SCNC: 137 MMOL/L (ref 132–146)
TROPONIN I SERPL-MCNC: 0.01 NG/ML (ref 0–0.07)
TROPONIN I SERPL-MCNC: 0.01 NG/ML (ref 0–0.07)
WBC NRBC COR # BLD: 12.25 10*3/MM3 (ref 3.5–10.8)
WHOLE BLOOD HOLD SPECIMEN: NORMAL
WHOLE BLOOD HOLD SPECIMEN: NORMAL

## 2018-07-03 PROCEDURE — 71045 X-RAY EXAM CHEST 1 VIEW: CPT

## 2018-07-03 PROCEDURE — 80053 COMPREHEN METABOLIC PANEL: CPT

## 2018-07-03 PROCEDURE — 83880 ASSAY OF NATRIURETIC PEPTIDE: CPT | Performed by: EMERGENCY MEDICINE

## 2018-07-03 PROCEDURE — 93005 ELECTROCARDIOGRAM TRACING: CPT

## 2018-07-03 PROCEDURE — 83690 ASSAY OF LIPASE: CPT

## 2018-07-03 PROCEDURE — 84484 ASSAY OF TROPONIN QUANT: CPT

## 2018-07-03 PROCEDURE — 93005 ELECTROCARDIOGRAM TRACING: CPT | Performed by: EMERGENCY MEDICINE

## 2018-07-03 PROCEDURE — 99285 EMERGENCY DEPT VISIT HI MDM: CPT

## 2018-07-03 PROCEDURE — 85025 COMPLETE CBC W/AUTO DIFF WBC: CPT

## 2018-07-03 RX ORDER — ASPIRIN 81 MG/1
324 TABLET, CHEWABLE ORAL ONCE
Status: DISCONTINUED | OUTPATIENT
Start: 2018-07-03 | End: 2018-07-04 | Stop reason: HOSPADM

## 2018-07-03 RX ORDER — SODIUM CHLORIDE 0.9 % (FLUSH) 0.9 %
10 SYRINGE (ML) INJECTION AS NEEDED
Status: DISCONTINUED | OUTPATIENT
Start: 2018-07-03 | End: 2018-07-04 | Stop reason: HOSPADM
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
## 2018-07-09 ENCOUNTER — OFFICE VISIT (OUTPATIENT)
Dept: GASTROENTEROLOGY | Facility: CLINIC | Age: 53
End: 2018-07-09

## 2018-07-09 ENCOUNTER — LAB (OUTPATIENT)
Dept: LAB | Facility: HOSPITAL | Age: 53
End: 2018-07-09

## 2018-07-09 VITALS
HEART RATE: 73 BPM | DIASTOLIC BLOOD PRESSURE: 82 MMHG | OXYGEN SATURATION: 97 % | HEIGHT: 66 IN | WEIGHT: 116 LBS | BODY MASS INDEX: 18.64 KG/M2 | RESPIRATION RATE: 16 BRPM | SYSTOLIC BLOOD PRESSURE: 150 MMHG | TEMPERATURE: 97.8 F

## 2018-07-09 DIAGNOSIS — K59.00 CONSTIPATION, UNSPECIFIED CONSTIPATION TYPE: ICD-10-CM

## 2018-07-09 DIAGNOSIS — R19.4 CHANGE IN BOWEL HABITS: ICD-10-CM

## 2018-07-09 DIAGNOSIS — R10.31 RIGHT LOWER QUADRANT ABDOMINAL PAIN: Primary | ICD-10-CM

## 2018-07-09 DIAGNOSIS — R19.7 DIARRHEA, UNSPECIFIED TYPE: ICD-10-CM

## 2018-07-09 DIAGNOSIS — Z72.0 TOBACCO ABUSE: ICD-10-CM

## 2018-07-09 DIAGNOSIS — R10.31 RIGHT LOWER QUADRANT ABDOMINAL PAIN: ICD-10-CM

## 2018-07-09 PROCEDURE — 83516 IMMUNOASSAY NONANTIBODY: CPT

## 2018-07-09 PROCEDURE — 82784 ASSAY IGA/IGD/IGG/IGM EACH: CPT

## 2018-07-09 PROCEDURE — 36415 COLL VENOUS BLD VENIPUNCTURE: CPT

## 2018-07-09 PROCEDURE — 99243 OFF/OP CNSLTJ NEW/EST LOW 30: CPT | Performed by: INTERNAL MEDICINE

## 2018-07-09 NOTE — PROGRESS NOTES
PCP: Pooja Perdomo MD    Chief Complaint   Patient presents with   • Advice Only       History of Present Illness:   HPI  I appreciate the consult for abdominal pain and bowel irregularity.  Mrs. Guidry is a 52-year-old with a history of anxiety, depression and cervical cancer.  The patient presents with a greater than one-year history of bowel irregularity.  She states that at times  the stool can be very hard and other instances  a loose watery stool.  She denies any sonali blood in the stool.  The patient does have some lower abdominal pain that can be intermittent and does not radiate.  She describes the pain as sharp.  She has undergone 3 surgery procedures through the same incision.  The patient denies any night sweats, fever or chills.  There is no history of unexplained weight loss.  Mrs. Guidry states that her appetite is good.  Patient does have a 40+-pack-year history of tobacco abuse. She occasionally will drink alcohol during the course of a week.  The patient denies any difficult or painful swallowing.  There is no history of breakthrough heartburn.  She denies any bloating of the abdomen after meals.  The patient denies a family history of gastrointestinal cancer.  There is no known history of Crohn's disease or ulcerative colitis.  Past Medical History:   Diagnosis Date   • Allergy    • Anxiety    • Depression    • H/O echocardiogram 04/17/2018    EF 60% mild TR   • Ovarian cancer (CMS/HCC)     endometrial/cervical   • Ovarian cyst        Past Surgical History:   Procedure Laterality Date   • APPENDECTOMY     • HYSTERECTOMY     • ORIF WRIST FRACTURE Left 09/2017    with 2 plates   • TOTAL ABDOMINAL HYSTERECTOMY WITH SALPINGO OOPHORECTOMY      ovaries removed at 2 separate occcasions         Current Outpatient Prescriptions:   •  aspirin 81 MG EC tablet, Take 1 tablet by mouth Daily., Disp: 30 tablet, Rfl: 0  •  busPIRone (BUSPAR) 7.5 MG tablet, Take 1 tablet by mouth Every Night., Disp: 90  tablet, Rfl: 1  •  Cholecalciferol (VITAMIN D PO), Take 1,000 Units by mouth Daily., Disp: , Rfl:   •  citalopram (CeleXA) 10 MG tablet, Take 1 tablet by mouth Daily., Disp: 30 tablet, Rfl: 3  •  famotidine (PEPCID) 40 MG tablet, Take 1 tablet by mouth Every Morning., Disp: 14 tablet, Rfl: 0  •  methocarbamol (ROBAXIN) 500 MG tablet, Take 1 tablet by mouth 4 (Four) Times a Day As Needed for Muscle Spasms., Disp: 20 tablet, Rfl: 0  •  Multiple Vitamins-Minerals (MULTIVITAMIN WOMEN PO), Take 1 tablet by mouth Daily., Disp: , Rfl:   •  nitroglycerin (NITROSTAT) 0.4 MG SL tablet, Place 1 tablet under the tongue Every 5 (Five) Minutes As Needed for Chest Pain. Take no more than 3 doses in 15 minutes., Disp: 100 tablet, Rfl: 0  •  omeprazole (PRILOSEC) 20 MG capsule, Take 1 capsule by mouth every night at bedtime., Disp: 14 capsule, Rfl: 0  •  tiZANidine (ZANAFLEX) 4 MG tablet, Take 1 tablet by mouth 2 (Two) Times a Day As Needed for Muscle Spasms., Disp: 60 tablet, Rfl: 3  •  vitamin B-12 (CYANOCOBALAMIN) 500 MCG tablet, Take 500 mcg by mouth Daily., Disp: , Rfl:   •  vitamin E 200 UNIT capsule, Take 200 Units by mouth Daily., Disp: , Rfl:     Allergies   Allergen Reactions   • Penicillins Hives       Family History   Problem Relation Age of Onset   • Allergies Other    • ADD / ADHD Other    • Heart disease Other         cardiac disorder   • Stroke Other    • Depression Other    • Hypertension Other    • Lung cancer Other    • Uterine cancer Other    • Ovarian cancer Other    • Cancer Mother         left lung   • Stroke Mother         x 2   • Stroke Father         x 3   • Heart disease Father         heart attack   • No Known Problems Son    • No Known Problems Son    • No Known Problems Son        Social History     Social History   • Marital status:      Spouse name: N/A   • Number of children: N/A   • Years of education: N/A     Occupational History   • Not on file.     Social History Main Topics   • Smoking  status: Current Every Day Smoker     Packs/day: 0.50     Years: 40.00     Types: Cigarettes   • Smokeless tobacco: Never Used   • Alcohol use 1.2 - 1.8 oz/week     2 - 3 Cans of beer per week   • Drug use: No      Comment: quit MJ   • Sexual activity: Defer     Other Topics Concern   • Not on file     Social History Narrative   • No narrative on file       Review of Systems   Constitutional: Positive for appetite change, fatigue and unexpected weight change. Negative for fever.   HENT: Negative for dental problem, mouth sores, postnasal drip, sneezing, trouble swallowing and voice change.    Eyes: Negative for pain, redness and itching.   Respiratory: Negative for cough, shortness of breath and wheezing.    Cardiovascular: Negative for chest pain, palpitations and leg swelling.   Gastrointestinal: Positive for abdominal pain (burning), constipation and diarrhea. Negative for abdominal distention, anal bleeding, blood in stool, nausea, rectal pain and vomiting.   Endocrine: Negative for cold intolerance, heat intolerance, polydipsia and polyuria.   Genitourinary: Negative for dysuria, enuresis, flank pain, hematuria and urgency.   Musculoskeletal: Positive for back pain. Negative for arthralgias, joint swelling and myalgias.   Skin: Negative for color change, pallor and rash.   Allergic/Immunologic: Negative for environmental allergies, food allergies and immunocompromised state.   Neurological: Positive for light-headedness. Negative for dizziness, tremors, seizures, facial asymmetry, numbness and headaches.   Hematological: Bruises/bleeds easily.   Psychiatric/Behavioral: Negative for behavioral problems, dysphoric mood, hallucinations and self-injury.   All other systems reviewed and are negative.      Vitals:    07/09/18 1228   BP: 150/82   Pulse: 73   Resp: 16   Temp: 97.8 °F (36.6 °C)   SpO2: 97%       Physical Exam   Constitutional: She is oriented to person, place, and time. She appears well-nourished. No  distress.   HENT:   Head: Normocephalic and atraumatic.   Mouth/Throat: Oropharynx is clear and moist. No oropharyngeal exudate.   Eyes: EOM are normal. Pupils are equal, round, and reactive to light. No scleral icterus.   Neck: Neck supple. No thyromegaly present.   Cardiovascular: Normal rate, regular rhythm and normal heart sounds.  Exam reveals no gallop.    No murmur heard.  Pulmonary/Chest: Effort normal. She has wheezes (right side). She has no rales.   Abdominal: Soft. Bowel sounds are normal. There is no tenderness. There is no rebound and no guarding.   Musculoskeletal: Normal range of motion. She exhibits no edema or tenderness.   Lymphadenopathy:     She has no cervical adenopathy.   Neurological: She is alert and oriented to person, place, and time. She exhibits normal muscle tone.   Skin: Skin is dry. No erythema.   Psychiatric: She has a normal mood and affect. Her behavior is normal.   Vitals reviewed.      Sera was seen today for advice only.    Diagnoses and all orders for this visit:    Right lower quadrant abdominal pain  -     Celiac Ab tTG DGP TIgA; Future  -     Colonoscopy; Future    Change in bowel habits  -     Celiac Ab tTG DGP TIgA; Future  -     Colonoscopy; Future    Constipation, unspecified constipation type  -     Celiac Ab tTG DGP TIgA; Future  -     Colonoscopy; Future    Diarrhea, unspecified type  -     Celiac Ab tTG DGP TIgA; Future  -     Colonoscopy; Future    Tobacco abuse  -     Celiac Ab tTG DGP TIgA; Future  -     Colonoscopy; Future    The history is consistent with irritable bowel syndrome.  The patient has some issues with constipation and diarrhea.  Also need to include in the differential diagnosis mucosal disease including Crohn's disease, colorectal polyps and colorectal cancer.  She has increased risk for neoplasia given the long-standing history of tobacco abuse.      Plan: Will proceed with a colonoscopy for further evaluation.  The risks, benefits were  explained and she agrees to proceed.           Will check celiac panel.           Encourage tobacco cessation and discussed the rationale from a gastrointestinal standpoint.    I spent over 50% of the office visit counseling and answering questions from the patient.

## 2018-07-10 LAB
GLIADIN PEPTIDE IGA SER-ACNC: 5 UNITS (ref 0–19)
GLIADIN PEPTIDE IGG SER-ACNC: 2 UNITS (ref 0–19)
IGA SERPL-MCNC: 165 MG/DL (ref 87–352)
TTG IGA SER-ACNC: <2 U/ML (ref 0–3)
TTG IGG SER-ACNC: <2 U/ML (ref 0–5)

## 2018-07-11 ENCOUNTER — TELEPHONE (OUTPATIENT)
Dept: GASTROENTEROLOGY | Facility: CLINIC | Age: 53
End: 2018-07-11

## 2018-07-11 NOTE — TELEPHONE ENCOUNTER
----- Message from Reagan Patrick MD sent at 7/10/2018  5:28 PM EDT -----  Мария:  Let Mrs. Guidry know the labs were normal.  Thank you,  FREDO

## 2018-07-18 ENCOUNTER — TELEPHONE (OUTPATIENT)
Dept: GASTROENTEROLOGY | Facility: CLINIC | Age: 53
End: 2018-07-18

## 2018-07-18 DIAGNOSIS — R19.4 FREQUENT BOWEL MOVEMENTS: ICD-10-CM

## 2018-07-18 DIAGNOSIS — R10.31 ABDOMINAL PAIN, RIGHT LOWER QUADRANT: Primary | ICD-10-CM

## 2018-07-18 RX ORDER — SODIUM, POTASSIUM,MAG SULFATES 17.5-3.13G
1 SOLUTION, RECONSTITUTED, ORAL ORAL TAKE AS DIRECTED
Qty: 2 BOTTLE | Refills: 0 | Status: SHIPPED | OUTPATIENT
Start: 2018-07-18 | End: 2018-08-01

## 2018-08-01 ENCOUNTER — OFFICE VISIT (OUTPATIENT)
Dept: INTERNAL MEDICINE | Facility: CLINIC | Age: 53
End: 2018-08-01

## 2018-08-01 VITALS
WEIGHT: 118 LBS | DIASTOLIC BLOOD PRESSURE: 82 MMHG | TEMPERATURE: 97.5 F | HEIGHT: 66 IN | HEART RATE: 63 BPM | SYSTOLIC BLOOD PRESSURE: 136 MMHG | BODY MASS INDEX: 18.96 KG/M2 | OXYGEN SATURATION: 97 %

## 2018-08-01 DIAGNOSIS — F17.210 MODERATE CIGARETTE SMOKER (10-19 PER DAY): ICD-10-CM

## 2018-08-01 DIAGNOSIS — I73.9 PERIPHERAL VASCULAR DISEASE WITH CLAUDICATION (HCC): ICD-10-CM

## 2018-08-01 DIAGNOSIS — F33.41 RECURRENT MAJOR DEPRESSIVE DISORDER, IN PARTIAL REMISSION (HCC): ICD-10-CM

## 2018-08-01 DIAGNOSIS — Z12.11 SCREENING FOR COLON CANCER: Primary | ICD-10-CM

## 2018-08-01 DIAGNOSIS — F41.9 ANXIETY: ICD-10-CM

## 2018-08-01 DIAGNOSIS — R07.9 CHEST PAIN, UNSPECIFIED TYPE: ICD-10-CM

## 2018-08-01 DIAGNOSIS — K21.9 GASTROESOPHAGEAL REFLUX DISEASE, ESOPHAGITIS PRESENCE NOT SPECIFIED: ICD-10-CM

## 2018-08-01 PROCEDURE — 99214 OFFICE O/P EST MOD 30 MIN: CPT | Performed by: FAMILY MEDICINE

## 2018-08-01 RX ORDER — OMEPRAZOLE 20 MG/1
20 CAPSULE, DELAYED RELEASE ORAL
Qty: 30 CAPSULE | Refills: 2 | Status: SHIPPED | OUTPATIENT
Start: 2018-08-01 | End: 2018-11-26 | Stop reason: SDUPTHER

## 2018-08-01 RX ORDER — CITALOPRAM 20 MG/1
20 TABLET ORAL DAILY
Qty: 30 TABLET | Refills: 2 | Status: SHIPPED | OUTPATIENT
Start: 2018-08-01 | End: 2018-11-26 | Stop reason: SDUPTHER

## 2018-08-01 RX ORDER — BUSPIRONE HYDROCHLORIDE 7.5 MG/1
7.5 TABLET ORAL 2 TIMES DAILY
Qty: 180 TABLET | Refills: 0 | Status: SHIPPED | OUTPATIENT
Start: 2018-08-01 | End: 2018-09-25 | Stop reason: SDUPTHER

## 2018-08-01 NOTE — PROGRESS NOTES
"Subjective   Sera Guidry is a 52 y.o. female.     Chief Complaint   Patient presents with   • Follow-up     pt needs new referral for colonoscopy   • Med Refill       Visit Vitals  /82   Pulse 63   Temp 97.5 °F (36.4 °C) (Temporal Artery )   Ht 167.6 cm (66\")   Wt 53.5 kg (118 lb)   SpO2 97%   BMI 19.05 kg/m²         Depression   Visit Type: follow-up  Patient presents with the following symptoms: depressed mood, dry mouth, excessive worry, fatigue, irritability, muscle tension and nervousness/anxiety.  Patient is not experiencing: anhedonia, chest pain, choking sensation, compulsions, confusion, decreased concentration, dizziness, feelings of hopelessness, feelings of worthlessness, hypersomnia, hyperventilation, insomnia, malaise, memory impairment, nausea, obsessions, palpitations, panic, psychomotor agitation, psychomotor retardation, restlessness, shortness of breath, suicidal ideas, suicidal planning, thoughts of death, weight gain and weight loss.  Frequency of symptoms: constantly   Severity: moderate   Sleep per night: 6 hours  Sleep quality: poor  Nighttime awakenings: many  No history of: anemia  Compliance with medications:  %        Anxiety   Presents for follow-up visit. Symptoms include depressed mood, dry mouth, excessive worry, irritability, muscle tension and nervous/anxious behavior. Patient reports no chest pain, compulsions, confusion, decreased concentration, dizziness, feeling of choking, hyperventilation, insomnia, malaise, nausea, obsessions, palpitations, panic, restlessness, shortness of breath or suicidal ideas. Symptoms occur most days. The severity of symptoms is moderate. The quality of sleep is poor. Nighttime awakenings: several.     Her past medical history is significant for depression. Compliance with medications is %.   Heartburn   She reports no abdominal pain, no belching, no chest pain, no choking, no coughing, no dysphagia, no early satiety, no globus " sensation, no heartburn, no hoarse voice, no nausea, no sore throat, no stridor, no tooth decay, no water brash or no wheezing. This is a chronic problem. The current episode started more than 1 month ago. The problem occurs rarely. The problem has been rapidly improving. The symptoms are aggravated by certain foods. Pertinent negatives include no anemia, fatigue, melena, muscle weakness, orthopnea or weight loss. Risk factors include smoking/tobacco exposure and ETOH use. She has tried a PPI for the symptoms. The treatment provided significant relief.      Pt having a lot of stress. Pt went to ER in April for chest pain and did not get Cardiology follow up for stress test.  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 Pt has cramping in her legs.    Pt has anxiety and depression that are not improving on current dose of medication.   Pt missed colonoscopy.     Pt is smoking about 1/2 ppd cigarettes. Pt drinking 2-3 beers per day.     Pt was in ER 7/2018 and diagnosed with gastritis after enzymes negative.   Pt has to be at work [employer] at 4 am.   The following portions of the patient's history were reviewed and updated as appropriate: allergies, current medications, past family history, past medical history, past social history, past surgical history and problem list.    Past Medical History:   Diagnosis Date   • Allergy    • Anxiety    • Depression    • H/O echocardiogram 04/17/2018    EF 60% mild TR   • Ovarian cancer (CMS/HCC)     endometrial/cervical   • Ovarian cyst       Past Surgical History:   Procedure Laterality Date   • APPENDECTOMY     • HYSTERECTOMY     • ORIF WRIST FRACTURE Left 09/2017    with 2 plates   • TOTAL ABDOMINAL HYSTERECTOMY WITH SALPINGO OOPHORECTOMY      ovaries removed at 2 separate occcasions      Family History   Problem Relation Age of Onset   • Allergies Other    • ADD / ADHD Other    • Heart disease Other         cardiac disorder   • Stroke Other    • Depression  Other    • Hypertension Other    • Lung cancer Other    • Uterine cancer Other    • Ovarian cancer Other    • Cancer Mother         left lung   • Stroke Mother         x 2   • Stroke Father         x 3   • Heart disease Father         heart attack   • No Known Problems Son    • No Known Problems Son    • No Known Problems Son       Social History     Social History   • Marital status:      Spouse name: N/A   • Number of children: N/A   • Years of education: N/A     Occupational History   • Not on file.     Social History Main Topics   • Smoking status: Current Every Day Smoker     Packs/day: 0.50     Years: 40.00     Types: Cigarettes   • Smokeless tobacco: Never Used   • Alcohol use 1.2 - 1.8 oz/week     2 - 3 Cans of beer per week   • Drug use: No      Comment: quit MJ   • Sexual activity: Defer     Other Topics Concern   • Not on file     Social History Narrative   • No narrative on file        Review of Systems   Constitutional: Positive for irritability. Negative for chills, diaphoresis, fatigue, fever, weight gain and weight loss.   HENT: Negative for ear pain, hoarse voice, nosebleeds, postnasal drip, rhinorrhea, sinus pressure, sneezing and sore throat.    Eyes: Negative.  Negative for redness and itching.   Respiratory: Negative.  Negative for cough, choking, shortness of breath and wheezing.    Cardiovascular: Negative.  Negative for chest pain and palpitations.   Gastrointestinal: Negative.  Negative for abdominal pain, constipation, diarrhea, dysphagia, heartburn, melena, nausea and vomiting.   Endocrine: Negative.  Negative for cold intolerance, heat intolerance, polydipsia, polyphagia and polyuria.   Genitourinary: Negative.  Negative for dysuria, frequency, hematuria and urgency.   Musculoskeletal: Positive for back pain (scoliosis). Negative for arthralgias, muscle weakness and neck pain.   Skin: Negative.  Negative for color change and rash.   Allergic/Immunologic: Negative.  Negative for  environmental allergies.   Neurological: Negative.  Negative for dizziness, syncope, light-headedness and headaches.   Hematological: Negative.  Negative for adenopathy. Does not bruise/bleed easily.   Psychiatric/Behavioral: Positive for dysphoric mood and sleep disturbance. Negative for confusion, decreased concentration and suicidal ideas. The patient is nervous/anxious. The patient does not have insomnia.        Objective   Physical Exam   Constitutional: She is oriented to person, place, and time. She appears well-developed.   HENT:   Head: Normocephalic.   Right Ear: External ear normal.   Left Ear: External ear normal.   Nose: Nose normal.   Eyes: Pupils are equal, round, and reactive to light. Conjunctivae, EOM and lids are normal.   Neck: Trachea normal and normal range of motion. Neck supple. Carotid bruit is not present. No thyroid mass and no thyromegaly present.   Cardiovascular: Normal rate and regular rhythm.    No murmur heard.  Pulmonary/Chest: Effort normal and breath sounds normal. No respiratory distress. She has no decreased breath sounds. She has no wheezes. She has no rhonchi. She has no rales. She exhibits no tenderness.   Abdominal: Soft. Bowel sounds are normal. There is no tenderness.   Musculoskeletal: Normal range of motion.   Neurological: She is alert and oriented to person, place, and time.   Skin: Skin is warm and dry.   Psychiatric: She has a normal mood and affect. Her behavior is normal.   Nursing note and vitals reviewed.      Assessment/Plan   Sera was seen today for follow-up and med refill.    Diagnoses and all orders for this visit:    Screening for colon cancer  -     Ambulatory Referral For Screening Colonoscopy    Anxiety  -     busPIRone (BUSPAR) 7.5 MG tablet; Take 1 tablet by mouth 2 (Two) Times a Day.    Recurrent major depressive disorder, in partial remission (CMS/HCC)  -     citalopram (CeleXA) 20 MG tablet; Take 1 tablet by mouth Daily.    Gastroesophageal reflux  disease, esophagitis presence not specified  -     omeprazole (PRILOSEC) 20 MG capsule; Take 1 capsule by mouth every night at bedtime.    Chest pain, unspecified type  -     Ambulatory Referral to Cardiology    Peripheral vascular disease with claudication (CMS/HCC)  -     Ambulatory Referral to Vascular Surgery    Moderate cigarette smoker (10-19 per day)      Decrease alcohol  Increase buspar, increase citalopram.   Quit smoking         Current Outpatient Prescriptions:   •  aspirin 81 MG EC tablet, Take 1 tablet by mouth Daily., Disp: 30 tablet, Rfl: 0  •  busPIRone (BUSPAR) 7.5 MG tablet, Take 1 tablet by mouth 2 (Two) Times a Day., Disp: 180 tablet, Rfl: 0  •  Cholecalciferol (VITAMIN D PO), Take 1,000 Units by mouth Daily., Disp: , Rfl:   •  famotidine (PEPCID) 40 MG tablet, Take 1 tablet by mouth Every Morning., Disp: 14 tablet, Rfl: 0  •  Multiple Vitamins-Minerals (MULTIVITAMIN WOMEN PO), Take 1 tablet by mouth Daily., Disp: , Rfl:   •  nitroglycerin (NITROSTAT) 0.4 MG SL tablet, Place 1 tablet under the tongue Every 5 (Five) Minutes As Needed for Chest Pain. Take no more than 3 doses in 15 minutes., Disp: 100 tablet, Rfl: 0  •  omeprazole (PRILOSEC) 20 MG capsule, Take 1 capsule by mouth every night at bedtime., Disp: 30 capsule, Rfl: 2  •  citalopram (CeleXA) 20 MG tablet, Take 1 tablet by mouth Daily., Disp: 30 tablet, Rfl: 2  •  vitamin B-12 (CYANOCOBALAMIN) 500 MCG tablet, Take 500 mcg by mouth Daily., Disp: , Rfl:   •  vitamin E 200 UNIT capsule, Take 200 Units by mouth Daily., Disp: , Rfl:     Return in about 4 weeks (around 8/29/2018), or if symptoms worsen or fail to improve, for Recheck.

## 2018-09-06 DIAGNOSIS — K59.00 CONSTIPATION, UNSPECIFIED CONSTIPATION TYPE: Primary | ICD-10-CM

## 2018-09-06 RX ORDER — SODIUM, POTASSIUM,MAG SULFATES 17.5-3.13G
1 SOLUTION, RECONSTITUTED, ORAL ORAL TAKE AS DIRECTED
Qty: 2 BOTTLE | Refills: 0 | Status: SHIPPED | OUTPATIENT
Start: 2018-09-06 | End: 2018-11-26

## 2018-09-07 RX ORDER — SODIUM, POTASSIUM,MAG SULFATES 17.5-3.13G
1 SOLUTION, RECONSTITUTED, ORAL ORAL TAKE AS DIRECTED
Qty: 2 BOTTLE | Refills: 0 | Status: SHIPPED | OUTPATIENT
Start: 2018-09-07 | End: 2019-05-30

## 2018-09-11 ENCOUNTER — OUTSIDE FACILITY SERVICE (OUTPATIENT)
Dept: GASTROENTEROLOGY | Facility: CLINIC | Age: 53
End: 2018-09-11

## 2018-09-11 PROCEDURE — G0121 COLON CA SCRN NOT HI RSK IND: HCPCS | Performed by: INTERNAL MEDICINE

## 2018-09-25 ENCOUNTER — TELEPHONE (OUTPATIENT)
Dept: INTERNAL MEDICINE | Facility: CLINIC | Age: 53
End: 2018-09-25

## 2018-09-25 DIAGNOSIS — F41.9 ANXIETY: ICD-10-CM

## 2018-09-25 RX ORDER — BUSPIRONE HYDROCHLORIDE 7.5 MG/1
7.5 TABLET ORAL 2 TIMES DAILY
Qty: 180 TABLET | Refills: 0 | Status: SHIPPED | OUTPATIENT
Start: 2018-09-25 | End: 2018-11-26 | Stop reason: DRUGHIGH

## 2018-10-16 ENCOUNTER — TELEPHONE (OUTPATIENT)
Dept: INTERNAL MEDICINE | Facility: CLINIC | Age: 53
End: 2018-10-16

## 2018-10-16 DIAGNOSIS — Z72.0 TOBACCO USE: ICD-10-CM

## 2018-10-16 DIAGNOSIS — I73.9 PERIPHERAL VASCULAR DISEASE WITH CLAUDICATION (HCC): Primary | ICD-10-CM

## 2018-10-25 ENCOUNTER — OFFICE VISIT (OUTPATIENT)
Dept: CARDIAC SURGERY | Facility: CLINIC | Age: 53
End: 2018-10-25

## 2018-10-25 VITALS
OXYGEN SATURATION: 97 % | SYSTOLIC BLOOD PRESSURE: 177 MMHG | BODY MASS INDEX: 19.13 KG/M2 | WEIGHT: 119 LBS | HEIGHT: 66 IN | TEMPERATURE: 98.1 F | HEART RATE: 69 BPM | DIASTOLIC BLOOD PRESSURE: 100 MMHG

## 2018-10-25 DIAGNOSIS — I73.9 PERIPHERAL VASCULAR DISEASE WITH CLAUDICATION (HCC): Primary | ICD-10-CM

## 2018-10-25 DIAGNOSIS — F33.41 RECURRENT MAJOR DEPRESSIVE DISORDER, IN PARTIAL REMISSION (HCC): ICD-10-CM

## 2018-10-25 PROCEDURE — 99214 OFFICE O/P EST MOD 30 MIN: CPT | Performed by: THORACIC SURGERY (CARDIOTHORACIC VASCULAR SURGERY)

## 2018-10-26 DIAGNOSIS — I73.9 PAD (PERIPHERAL ARTERY DISEASE) (HCC): Primary | ICD-10-CM

## 2018-11-02 ENCOUNTER — APPOINTMENT (OUTPATIENT)
Dept: CT IMAGING | Facility: HOSPITAL | Age: 53
End: 2018-11-02

## 2018-11-05 ENCOUNTER — HOSPITAL ENCOUNTER (OUTPATIENT)
Dept: CT IMAGING | Facility: HOSPITAL | Age: 53
Discharge: HOME | End: 2018-11-05
Admitting: PHYSICIAN ASSISTANT

## 2018-11-05 DIAGNOSIS — I73.9 PAD (PERIPHERAL ARTERY DISEASE) (HCC): ICD-10-CM

## 2018-11-05 PROCEDURE — 82565 ASSAY OF CREATININE: CPT

## 2018-11-05 PROCEDURE — 75635 CT ANGIO ABDOMINAL ARTERIES: CPT

## 2018-11-05 PROCEDURE — 0 IOPAMIDOL PER 1 ML: Performed by: PHYSICIAN ASSISTANT

## 2018-11-05 RX ADMIN — IOPAMIDOL 125 ML: 755 INJECTION, SOLUTION INTRAVENOUS at 14:25

## 2018-11-06 LAB — CREAT BLDA-MCNC: 0.7 MG/DL (ref 0.6–1.3)

## 2018-11-08 ENCOUNTER — OFFICE VISIT (OUTPATIENT)
Dept: CARDIAC SURGERY | Facility: CLINIC | Age: 53
End: 2018-11-08

## 2018-11-08 VITALS
WEIGHT: 119 LBS | HEIGHT: 66 IN | HEART RATE: 81 BPM | TEMPERATURE: 98.4 F | SYSTOLIC BLOOD PRESSURE: 145 MMHG | BODY MASS INDEX: 19.13 KG/M2 | OXYGEN SATURATION: 95 % | DIASTOLIC BLOOD PRESSURE: 86 MMHG

## 2018-11-08 DIAGNOSIS — I73.9 PERIPHERAL VASCULAR DISEASE WITH CLAUDICATION (HCC): Primary | ICD-10-CM

## 2018-11-08 DIAGNOSIS — G47.62 LEG CRAMPS, SLEEP RELATED: ICD-10-CM

## 2018-11-08 DIAGNOSIS — M54.31 BILATERAL SCIATICA: ICD-10-CM

## 2018-11-08 DIAGNOSIS — F33.41 RECURRENT MAJOR DEPRESSIVE DISORDER, IN PARTIAL REMISSION (HCC): ICD-10-CM

## 2018-11-08 DIAGNOSIS — M54.32 BILATERAL SCIATICA: ICD-10-CM

## 2018-11-08 PROCEDURE — 99213 OFFICE O/P EST LOW 20 MIN: CPT | Performed by: THORACIC SURGERY (CARDIOTHORACIC VASCULAR SURGERY)

## 2018-11-08 NOTE — PROGRESS NOTES
11/08/2018  Patient Information  Sera Guidry                                                                                          [address]  [address]  [place] [address]   1965  'PCP/Referring Physician'  Pooja Perdomo MD  [phone]  No ref. provider found    Chief Complaint   Patient presents with   • Claudication     Follow-up after CTA abdomen with runoff      CC: My legs hurt so bad I couldn't go to work today    History of Present Illness: 52-year-old  female being seen in follow-up and for review of her recent CT angiogram of the abdominal aorta with runoff and 3-D reconstruction the patient is complaining of pain across the back of both hips and going down into both legs.  She states that the pain is been so severe for the last 2 or 3 days that she has been unable to go to work.  She has not had rest pain loss of sensation or loss of motor function of the feet.  She does not have cyanosis or discoloration of any of her toes.  This patient does not have diabetes but does have hypertension and hyperlipidemia.  She has an ongoing smoking history. She states that she has been treated for scoliosis, back pain, and bilateral sciatic nerve problems in the past.  On close questioning she describes fairly typical calf claudication in the right lower extremity if she walks more than 50 yards.  This is not however her most dominant symptom.      Patient Active Problem List   Diagnosis   • Sciatica   • Scoliosis   • Anxiety   • Tobacco use   • Fatigue   • Hot flashes   • Mood swings   • Leg cramps, sleep related   • Encounter for screening colonoscopy   • Osteoporosis screening   • Breast cancer screening   • Skin lesion   • Hormone imbalance   • Screening for cardiovascular condition   • Recurrent major depressive disorder, in partial remission (CMS/HCC)   • Chest pain   • Peripheral vascular disease with claudication (CMS/HCC)     Past Medical History:   Diagnosis Date   •  Allergy    • Anxiety    • Depression    • Endometriosis    • GERD (gastroesophageal reflux disease)    • H/O echocardiogram 04/17/2018    EF 60% mild TR   • Ovarian cancer (CMS/HCC)     endometrial/cervical   • Ovarian cyst    • Scoliosis      Past Surgical History:   Procedure Laterality Date   • APPENDECTOMY     • COLONOSCOPY  09/11/2018    Dr Patrick   • HYSTERECTOMY     • ORIF WRIST FRACTURE Left 09/2017    with 2 plates   • TOTAL ABDOMINAL HYSTERECTOMY WITH SALPINGO OOPHORECTOMY      ovaries removed at 2 separate occcasions       Current Outpatient Prescriptions:   •  aspirin 81 MG EC tablet, Take 1 tablet by mouth Daily., Disp: 30 tablet, Rfl: 0  •  busPIRone (BUSPAR) 7.5 MG tablet, Take 1 tablet by mouth 2 (Two) Times a Day., Disp: 180 tablet, Rfl: 0  •  Cholecalciferol (VITAMIN D PO), Take 1,000 Units by mouth Daily., Disp: , Rfl:   •  citalopram (CeleXA) 20 MG tablet, Take 1 tablet by mouth Daily., Disp: 30 tablet, Rfl: 2  •  famotidine (PEPCID) 40 MG tablet, Take 1 tablet by mouth Every Morning., Disp: 14 tablet, Rfl: 0  •  Multiple Vitamins-Minerals (MULTIVITAMIN WOMEN PO), Take 1 tablet by mouth Daily., Disp: , Rfl:   •  nitroglycerin (NITROSTAT) 0.4 MG SL tablet, Place 1 tablet under the tongue Every 5 (Five) Minutes As Needed for Chest Pain. Take no more than 3 doses in 15 minutes., Disp: 100 tablet, Rfl: 0  •  omeprazole (PRILOSEC) 20 MG capsule, Take 1 capsule by mouth every night at bedtime., Disp: 30 capsule, Rfl: 2  •  sodium-potassium-magnesium sulfates (SUPREP BOWEL PREP KIT) 17.5-3.13-1.6 GM/180ML solution oral solution, Take 1 bottle by mouth Take As Directed. Follow instructions that were mailed to your home. If you didn't receive these call [phone]., Disp: 2 bottle, Rfl: 0  •  sodium-potassium-magnesium sulfates (SUPREP BOWEL PREP KIT) 17.5-3.13-1.6 GM/180ML solution oral solution, Take 1 bottle by mouth Take As Directed. Follow instructions that were mailed to your home. If you  didn't receive these call [phone]., Disp: 2 bottle, Rfl: 0  •  vitamin B-12 (CYANOCOBALAMIN) 500 MCG tablet, Take 500 mcg by mouth Daily., Disp: , Rfl:   •  vitamin E 200 UNIT capsule, Take 200 Units by mouth Daily., Disp: , Rfl:   Allergies   Allergen Reactions   • Penicillins Hives     Social History     Social History   • Marital status:      Spouse name: N/A   • Number of children: 3   • Years of education: N/A     Occupational History   • [employer]      Social History Main Topics   • Smoking status: Current Every Day Smoker     Packs/day: 0.50     Years: 40.00     Types: Cigarettes   • Smokeless tobacco: Never Used   • Alcohol use 1.2 - 1.8 oz/week     2 - 3 Cans of beer per week   • Drug use: No      Comment: quit MJ   • Sexual activity: Defer     Other Topics Concern   • Not on file     Social History Narrative    Lives in [facility] with her .     Family History   Problem Relation Age of Onset   • Allergies Other    • ADD / ADHD Other    • Heart disease Other         cardiac disorder   • Stroke Other    • Depression Other    • Hypertension Other    • Lung cancer Other    • Uterine cancer Other    • Ovarian cancer Other    • Cancer Mother         left lung   • Stroke Mother         x 2   • Stroke Father         x 3   • Heart disease Father         heart attack   • No Known Problems Son    • No Known Problems Son    • No Known Problems Son      Review of Systems   Constitution: Positive for malaise/fatigue. Negative for chills, fever, night sweats and weight loss.   HENT: Negative for hearing loss, odynophagia and sore throat.    Cardiovascular: Positive for chest pain, claudication, dyspnea on exertion and leg swelling. Negative for orthopnea and palpitations.   Respiratory: Positive for cough and shortness of breath. Negative for hemoptysis.    Endocrine: Negative for cold intolerance, heat intolerance, polydipsia, polyphagia and polyuria.   Hematologic/Lymphatic: Does not  "bruise/bleed easily.   Skin: Negative for itching and rash.   Musculoskeletal: Positive for back pain. Negative for joint pain, joint swelling and myalgias.   Gastrointestinal: Positive for abdominal pain. Negative for constipation, diarrhea, hematemesis, hematochezia, melena, nausea and vomiting.   Genitourinary: Negative for dysuria, frequency and hematuria.   Neurological: Positive for dizziness. Negative for focal weakness, headaches, numbness and seizures.   Psychiatric/Behavioral: Positive for depression. Negative for suicidal ideas. The patient is nervous/anxious.    All other systems reviewed and are negative.    Vitals:    11/08/18 1029   BP: 145/86   BP Location: Right arm   Patient Position: Sitting   Pulse: 81   Temp: 98.4 °F (36.9 °C)   SpO2: 95%   Weight: 54 kg (119 lb)   Height: 167.6 cm (66\")      Physical Exam   Constitutional: She is oriented to person, place, and time. She appears well-developed and well-nourished. No distress.   HENT:   Head: Normocephalic.   Right Ear: External ear normal.   Left Ear: External ear normal.   Nose: Nose normal.   Eyes: Pupils are equal, round, and reactive to light.   Neck: Normal range of motion. Neck supple. No tracheal deviation present. No thyromegaly present.   Cardiovascular: Normal rate and regular rhythm.    Murmur heard.  Left dorsalis pedis and posterior tibial pulses are palpable.  Right dorsalis pedis and posterior tibial pulses are present with the Doppler.  Right common femoral pulse is not palpable.  Capillary refill on the left is 2 seconds.  Capillary refill on the right is 3 seconds nails and skin are normal bilateral.    I/VI systolic murmur LSB.   Pulmonary/Chest: Effort normal and breath sounds normal. No respiratory distress. She has no wheezes. She has no rales. She exhibits no tenderness.   Abdominal: Soft. Bowel sounds are normal. She exhibits no distension and no mass. There is no tenderness.   Musculoskeletal: Normal range of motion. She " exhibits no edema.   Lymphadenopathy:     She has no cervical adenopathy.   Neurological: She is alert and oriented to person, place, and time. She has normal reflexes. No cranial nerve deficit.   Skin: Skin is warm and dry. No rash noted. No erythema.   Psychiatric: She has a normal mood and affect.       Labs/Imaging: CT angiogram of the abdominal aorta with 3-D reconstruction reviewed.  Results are noted in the patient's record.  The patient has relatively small artery with extensive atherosclerotic changes in the distal aorta and both common iliac arteries.  The right external iliac artery is completely occluded and reconstituted in the right femoral region.  There is single-vessel runoff to the ankle on the right and 2 vessel runoff on the left.    Assessment: Significant peripheral vascular disease with occlusion of the right external iliac artery. The patient's major symptoms however seem to be unrelated to her vascular status and are more likely related to spinal stenosis or problems with her lumbar spine.    Plan: The patient will return to this clinic for further follow-up of her vascular disease in 6 months.  Meanwhile I believe that she should have a workup of her lumbar spine with MRI and probable neurosurgical consultation.  I have discussed this with her in detail.  She is a candidate for a femoral-femoral bypass however I do not believe that completion of the bypass would alleviate her symptoms.    Patient Active Problem List   Diagnosis   • Sciatica   • Scoliosis   • Anxiety   • Tobacco use   • Fatigue   • Hot flashes   • Mood swings   • Leg cramps, sleep related   • Encounter for screening colonoscopy   • Osteoporosis screening   • Breast cancer screening   • Skin lesion   • Hormone imbalance   • Screening for cardiovascular condition   • Recurrent major depressive disorder, in partial remission (CMS/HCC)   • Chest pain   • Peripheral vascular disease with claudication (CMS/HCC)         Daniel  MD T[facility]  [facility]  11/08/18   1:25 PM

## 2018-11-26 ENCOUNTER — OFFICE VISIT (OUTPATIENT)
Dept: INTERNAL MEDICINE | Facility: CLINIC | Age: 53
End: 2018-11-26

## 2018-11-26 VITALS
SYSTOLIC BLOOD PRESSURE: 128 MMHG | DIASTOLIC BLOOD PRESSURE: 70 MMHG | TEMPERATURE: 97.7 F | HEART RATE: 67 BPM | HEIGHT: 66 IN | OXYGEN SATURATION: 98 % | WEIGHT: 119.8 LBS | BODY MASS INDEX: 19.25 KG/M2

## 2018-11-26 DIAGNOSIS — M41.125 ADOLESCENT IDIOPATHIC SCOLIOSIS OF THORACOLUMBAR REGION: ICD-10-CM

## 2018-11-26 DIAGNOSIS — R53.83 OTHER FATIGUE: ICD-10-CM

## 2018-11-26 DIAGNOSIS — F33.41 RECURRENT MAJOR DEPRESSIVE DISORDER, IN PARTIAL REMISSION (HCC): ICD-10-CM

## 2018-11-26 DIAGNOSIS — Z72.0 TOBACCO USE: ICD-10-CM

## 2018-11-26 DIAGNOSIS — G89.29 CHRONIC MIDLINE THORACIC BACK PAIN: ICD-10-CM

## 2018-11-26 DIAGNOSIS — Z23 NEED FOR VACCINATION FOR STREP PNEUMONIAE: ICD-10-CM

## 2018-11-26 DIAGNOSIS — I73.9 PERIPHERAL VASCULAR DISEASE WITH CLAUDICATION (HCC): ICD-10-CM

## 2018-11-26 DIAGNOSIS — M54.6 CHRONIC MIDLINE THORACIC BACK PAIN: ICD-10-CM

## 2018-11-26 DIAGNOSIS — M54.42 CHRONIC MIDLINE LOW BACK PAIN WITH BILATERAL SCIATICA: ICD-10-CM

## 2018-11-26 DIAGNOSIS — R30.0 DYSURIA: ICD-10-CM

## 2018-11-26 DIAGNOSIS — G47.9 SLEEP DISTURBANCE: ICD-10-CM

## 2018-11-26 DIAGNOSIS — R21 RASH: ICD-10-CM

## 2018-11-26 DIAGNOSIS — F41.9 ANXIETY: Primary | ICD-10-CM

## 2018-11-26 DIAGNOSIS — M54.31 BILATERAL SCIATICA: ICD-10-CM

## 2018-11-26 DIAGNOSIS — M54.32 BILATERAL SCIATICA: ICD-10-CM

## 2018-11-26 DIAGNOSIS — M54.41 CHRONIC MIDLINE LOW BACK PAIN WITH BILATERAL SCIATICA: ICD-10-CM

## 2018-11-26 DIAGNOSIS — K21.9 GASTROESOPHAGEAL REFLUX DISEASE, ESOPHAGITIS PRESENCE NOT SPECIFIED: ICD-10-CM

## 2018-11-26 DIAGNOSIS — G89.29 CHRONIC MIDLINE LOW BACK PAIN WITH BILATERAL SCIATICA: ICD-10-CM

## 2018-11-26 LAB
25(OH)D3 SERPL-MCNC: 30 NG/ML
ALBUMIN SERPL-MCNC: 4.82 G/DL (ref 3.2–4.8)
ALBUMIN/GLOB SERPL: 2.1 G/DL (ref 1.5–2.5)
ALP SERPL-CCNC: 88 U/L (ref 25–100)
ALT SERPL W P-5'-P-CCNC: 33 U/L (ref 7–40)
ANION GAP SERPL CALCULATED.3IONS-SCNC: 4 MMOL/L (ref 3–11)
ARTICHOKE IGE QN: 124 MG/DL (ref 0–130)
AST SERPL-CCNC: 25 U/L (ref 0–33)
BASOPHILS # BLD AUTO: 0.1 10*3/MM3 (ref 0–0.2)
BASOPHILS NFR BLD AUTO: 1 % (ref 0–1)
BILIRUB BLD-MCNC: NEGATIVE MG/DL
BILIRUB SERPL-MCNC: 0.3 MG/DL (ref 0.3–1.2)
BUN BLD-MCNC: 15 MG/DL (ref 9–23)
BUN/CREAT SERPL: 22.1 (ref 7–25)
CALCIUM SPEC-SCNC: 9.4 MG/DL (ref 8.7–10.4)
CHLORIDE SERPL-SCNC: 107 MMOL/L (ref 99–109)
CHOLEST SERPL-MCNC: 249 MG/DL (ref 0–200)
CLARITY, POC: CLEAR
CO2 SERPL-SCNC: 26 MMOL/L (ref 20–31)
COLOR UR: YELLOW
CREAT BLD-MCNC: 0.68 MG/DL (ref 0.6–1.3)
DEPRECATED RDW RBC AUTO: 50.8 FL (ref 37–54)
EOSINOPHIL # BLD AUTO: 0.32 10*3/MM3 (ref 0–0.3)
EOSINOPHIL NFR BLD AUTO: 3.1 % (ref 0–3)
ERYTHROCYTE [DISTWIDTH] IN BLOOD BY AUTOMATED COUNT: 13.9 % (ref 11.3–14.5)
FOLATE SERPL-MCNC: 7.94 NG/ML (ref 3.2–20)
GFR SERPL CREATININE-BSD FRML MDRD: 91 ML/MIN/1.73
GLOBULIN UR ELPH-MCNC: 2.3 GM/DL
GLUCOSE BLD-MCNC: 85 MG/DL (ref 70–100)
GLUCOSE UR STRIP-MCNC: NEGATIVE MG/DL
HCT VFR BLD AUTO: 46.4 % (ref 34.5–44)
HDLC SERPL-MCNC: >115 MG/DL (ref 40–60)
HGB BLD-MCNC: 15.6 G/DL (ref 11.5–15.5)
IMM GRANULOCYTES # BLD: 0.03 10*3/MM3 (ref 0–0.03)
IMM GRANULOCYTES NFR BLD: 0.3 % (ref 0–0.6)
KETONES UR QL: NEGATIVE
LEUKOCYTE EST, POC: NEGATIVE
LYMPHOCYTES # BLD AUTO: 3.97 10*3/MM3 (ref 0.6–4.8)
LYMPHOCYTES NFR BLD AUTO: 37.9 % (ref 24–44)
MCH RBC QN AUTO: 33.6 PG (ref 27–31)
MCHC RBC AUTO-ENTMCNC: 33.6 G/DL (ref 32–36)
MCV RBC AUTO: 100 FL (ref 80–99)
MONOCYTES # BLD AUTO: 0.69 10*3/MM3 (ref 0–1)
MONOCYTES NFR BLD AUTO: 6.6 % (ref 0–12)
NEUTROPHILS # BLD AUTO: 5.39 10*3/MM3 (ref 1.5–8.3)
NEUTROPHILS NFR BLD AUTO: 51.4 % (ref 41–71)
NITRITE UR-MCNC: NEGATIVE MG/ML
PH UR: 6 [PH] (ref 5–8)
PLATELET # BLD AUTO: 338 10*3/MM3 (ref 150–450)
PMV BLD AUTO: 9.5 FL (ref 6–12)
POTASSIUM BLD-SCNC: 5.1 MMOL/L (ref 3.5–5.5)
PROT SERPL-MCNC: 7.1 G/DL (ref 5.7–8.2)
PROT UR STRIP-MCNC: NEGATIVE MG/DL
RBC # BLD AUTO: 4.64 10*6/MM3 (ref 3.89–5.14)
RBC # UR STRIP: NEGATIVE /UL
SODIUM BLD-SCNC: 137 MMOL/L (ref 132–146)
SP GR UR: 1.01 (ref 1–1.03)
T4 FREE SERPL-MCNC: 1.05 NG/DL (ref 0.89–1.76)
TRIGL SERPL-MCNC: 132 MG/DL (ref 0–150)
TSH SERPL DL<=0.05 MIU/L-ACNC: 1.84 MIU/ML (ref 0.35–5.35)
UROBILINOGEN UR QL: NORMAL
VIT B12 BLD-MCNC: 566 PG/ML (ref 211–911)
WBC NRBC COR # BLD: 10.47 10*3/MM3 (ref 3.5–10.8)

## 2018-11-26 PROCEDURE — 84207 ASSAY OF VITAMIN B-6: CPT | Performed by: FAMILY MEDICINE

## 2018-11-26 PROCEDURE — 99214 OFFICE O/P EST MOD 30 MIN: CPT | Performed by: FAMILY MEDICINE

## 2018-11-26 PROCEDURE — 84439 ASSAY OF FREE THYROXINE: CPT | Performed by: FAMILY MEDICINE

## 2018-11-26 PROCEDURE — 90471 IMMUNIZATION ADMIN: CPT | Performed by: FAMILY MEDICINE

## 2018-11-26 PROCEDURE — 82746 ASSAY OF FOLIC ACID SERUM: CPT | Performed by: FAMILY MEDICINE

## 2018-11-26 PROCEDURE — 90732 PPSV23 VACC 2 YRS+ SUBQ/IM: CPT | Performed by: FAMILY MEDICINE

## 2018-11-26 PROCEDURE — 82306 VITAMIN D 25 HYDROXY: CPT | Performed by: FAMILY MEDICINE

## 2018-11-26 PROCEDURE — 82607 VITAMIN B-12: CPT | Performed by: FAMILY MEDICINE

## 2018-11-26 PROCEDURE — 80050 GENERAL HEALTH PANEL: CPT | Performed by: FAMILY MEDICINE

## 2018-11-26 PROCEDURE — 80061 LIPID PANEL: CPT | Performed by: FAMILY MEDICINE

## 2018-11-26 RX ORDER — BUSPIRONE HYDROCHLORIDE 15 MG/1
15 TABLET ORAL 2 TIMES DAILY
Qty: 60 TABLET | Refills: 2 | Status: SHIPPED | OUTPATIENT
Start: 2018-11-26 | End: 2019-05-30 | Stop reason: SDUPTHER

## 2018-11-26 RX ORDER — KETOCONAZOLE 20 MG/G
CREAM TOPICAL EVERY 12 HOURS SCHEDULED
Qty: 30 G | Refills: 1 | Status: SHIPPED | OUTPATIENT
Start: 2018-11-26 | End: 2018-12-06

## 2018-11-26 RX ORDER — CITALOPRAM 20 MG/1
20 TABLET ORAL DAILY
Qty: 30 TABLET | Refills: 2 | Status: SHIPPED | OUTPATIENT
Start: 2018-11-26 | End: 2019-05-30 | Stop reason: SDUPTHER

## 2018-11-26 RX ORDER — OMEPRAZOLE 20 MG/1
20 CAPSULE, DELAYED RELEASE ORAL
Qty: 30 CAPSULE | Refills: 2 | Status: SHIPPED | OUTPATIENT
Start: 2018-11-26 | End: 2019-05-30 | Stop reason: SDUPTHER

## 2018-11-26 NOTE — PROGRESS NOTES
"Subjective   Sera Guidry is a 52 y.o. female.     Chief Complaint   Patient presents with   • Anxiety     f/u   • Immunizations     Hep A       Visit Vitals  /70 (BP Location: Left arm, Patient Position: Sitting)   Pulse 67   Temp 97.7 °F (36.5 °C)   Ht 167.6 cm (66\")   Wt 54.3 kg (119 lb 12.8 oz)   SpO2 98%   BMI 19.34 kg/m²         Anxiety   Presents for follow-up visit. Symptoms include chest pain (on exertion), depressed mood, dizziness, excessive worry, feeling of choking, irritability, nervous/anxious behavior, panic and shortness of breath. Patient reports no compulsions, confusion, decreased concentration, dry mouth, hyperventilation, insomnia, malaise, muscle tension, nausea, obsessions, palpitations, restlessness or suicidal ideas. Primary symptoms comment: pt working 7 days per week and is caring for .. Symptoms occur occasionally. The severity of symptoms is moderate. The patient sleeps 8 hours per night. The quality of sleep is fair. Nighttime awakenings: several.     Compliance with medications is %.      Pt is here for follow-up. Pt will have difficulty breathing on her back.  Pt has taken her husbands albuterol and symbicort.  Pt states that her back feels better when reclining on back.    Pt has to see a back specialist because of scoliosis before her peripheral vascularsurgeon will operate on her.   Pt stands 7.5-8 hours per day. Pt is still smoking. Pt is trying to cut down.     Pt does snore per .  thinks that she stops breathing.     Pt has to use NTG ix per week max.   Pt is working at [employer].   The following portions of the patient's history were reviewed and updated as appropriate: allergies, current medications, past family history, past medical history, past social history, past surgical history and problem list.    Past Medical History:   Diagnosis Date   • Allergy    • Anxiety    • Depression    • Endometriosis    • GERD (gastroesophageal reflux " disease)    • H/O echocardiogram 04/17/2018    EF 60% mild TR   • Ovarian cancer (CMS/HCC)     endometrial/cervical   • Ovarian cyst    • Scoliosis       Past Surgical History:   Procedure Laterality Date   • APPENDECTOMY     • COLONOSCOPY  09/11/2018    Dr Patrick   • HYSTERECTOMY     • ORIF WRIST FRACTURE Left 09/2017    with 2 plates   • TOTAL ABDOMINAL HYSTERECTOMY WITH SALPINGO OOPHORECTOMY      ovaries removed at 2 separate occcasions      Family History   Problem Relation Age of Onset   • Allergies Other    • ADD / ADHD Other    • Heart disease Other         cardiac disorder   • Stroke Other    • Depression Other    • Hypertension Other    • Lung cancer Other    • Uterine cancer Other    • Ovarian cancer Other    • Cancer Mother         left lung   • Stroke Mother         x 2   • Stroke Father         x 3   • Heart disease Father         heart attack   • No Known Problems Son    • No Known Problems Son    • No Known Problems Son       Social History     Socioeconomic History   • Marital status:      Spouse name: Not on file   • Number of children: 3   • Years of education: Not on file   • Highest education level: Not on file   Social Needs   • Financial resource strain: Not on file   • Food insecurity - worry: Not on file   • Food insecurity - inability: Not on file   • Transportation needs - medical: Not on file   • Transportation needs - non-medical: Not on file   Occupational History   • Occupation: [employer]   Tobacco Use   • Smoking status: Current Every Day Smoker     Packs/day: 0.50     Years: 40.00     Pack years: 20.00     Types: Cigarettes   • Smokeless tobacco: Never Used   Substance and Sexual Activity   • Alcohol use: Yes     Alcohol/week: 1.2 - 1.8 oz     Types: 2 - 3 Cans of beer per week   • Drug use: No     Comment: quit MJ   • Sexual activity: Defer   Other Topics Concern   • Not on file   Social History Narrative    Lives in [facility] with her .        Review of Systems    Constitutional: Positive for fatigue and irritability. Negative for chills, diaphoresis and fever.   HENT: Positive for congestion, sinus pressure, sneezing and sore throat. Negative for ear pain, nosebleeds, postnasal drip and rhinorrhea.    Eyes: Positive for itching. Negative for redness.   Respiratory: Positive for shortness of breath and wheezing. Negative for cough.    Cardiovascular: Positive for chest pain (on exertion). Negative for palpitations.   Gastrointestinal: Positive for abdominal pain (suprapubic). Negative for constipation, diarrhea, nausea and vomiting.   Endocrine: Positive for polydipsia. Negative for cold intolerance and heat intolerance.   Genitourinary: Positive for dysuria. Negative for frequency, hematuria and urgency.   Musculoskeletal: Positive for back pain. Negative for arthralgias and neck pain.   Skin: Positive for rash (fine papular rash under left breast). Negative for color change.   Allergic/Immunologic: Positive for environmental allergies.   Neurological: Positive for dizziness and light-headedness. Negative for syncope and headaches.   Hematological: Negative.  Negative for adenopathy. Does not bruise/bleed easily.   Psychiatric/Behavioral: Positive for dysphoric mood and sleep disturbance. Negative for confusion, decreased concentration and suicidal ideas. The patient is nervous/anxious. The patient does not have insomnia.        Objective   Physical Exam   Constitutional: She is oriented to person, place, and time. She appears well-developed.   HENT:   Head: Normocephalic.   Right Ear: External ear normal.   Left Ear: External ear normal.   Nose: Nose normal.   Eyes: Conjunctivae, EOM and lids are normal. Pupils are equal, round, and reactive to light.   Neck: Trachea normal and normal range of motion. Neck supple. Carotid bruit is not present. No thyroid mass and no thyromegaly present.   Cardiovascular: Normal rate and regular rhythm.   No murmur heard.  Pulmonary/Chest:  Effort normal and breath sounds normal. No respiratory distress. She has no decreased breath sounds. She has no wheezes. She has no rhonchi. She has no rales. She exhibits no tenderness.   Abdominal: Soft. Bowel sounds are normal. There is tenderness in the suprapubic area. There is no rebound and no guarding.   Musculoskeletal: Normal range of motion.   Neurological: She is alert and oriented to person, place, and time.   Skin: Skin is warm and dry.   Psychiatric: She has a normal mood and affect. Her behavior is normal.   Nursing note and vitals reviewed.      Assessment/Plan   Sera was seen today for anxiety and immunizations.    Diagnoses and all orders for this visit:    Anxiety  -     busPIRone (BUSPAR) 15 MG tablet; Take 1 tablet by mouth 2 (Two) Times a Day.    Rash    Dysuria  -     POC Urinalysis Dipstick, Automated    Peripheral vascular disease with claudication (CMS/HCC)  -     Ambulatory Referral to Neurosurgery  -     Comprehensive Metabolic Panel  -     Lipid Panel    Adolescent idiopathic scoliosis of thoracolumbar region  -     MRI Lumbar Spine Without Contrast; Future  -     Ambulatory Referral to Neurosurgery    Bilateral sciatica  -     MRI Lumbar Spine Without Contrast; Future  -     Ambulatory Referral to Neurosurgery    Chronic midline low back pain with bilateral sciatica  -     MRI Lumbar Spine Without Contrast; Future  -     Ambulatory Referral to Neurosurgery    Chronic midline thoracic back pain  -     Ambulatory Referral to Neurosurgery    Sleep disturbance  -     Ambulatory Referral to Sleep Medicine    Other fatigue  -     Ambulatory Referral to Sleep Medicine  -     Comprehensive Metabolic Panel  -     TSH  -     T4, Free  -     Vitamin B12  -     Folate  -     Vitamin D 25 Hydroxy  -     CBC & Differential  -     Vitamin B6  -     CBC Auto Differential    Recurrent major depressive disorder, in partial remission (CMS/HCC)  -     citalopram (CeleXA) 20 MG tablet; Take 1 tablet by  mouth Daily.    Gastroesophageal reflux disease, esophagitis presence not specified  -     omeprazole (PRILOSEC) 20 MG capsule; Take 1 capsule by mouth every night at bedtime.    Tobacco use  -     Pneumococcal Polysaccharide Vaccine 23-Valent (PPSV23) Greater Than or Equal To 1yo Subcutaneous / IM    Need for vaccination for Strep pneumoniae  -     Pneumococcal Polysaccharide Vaccine 23-Valent (PPSV23) Greater Than or Equal To 1yo Subcutaneous / IM    Other orders  -     ketoconazole (NIZORAL) 2 % cream; Apply  topically to the appropriate area as directed Every 12 (Twelve) Hours for 10 days.        Get hep A and shingrix at pharmacy.   Increase buspar to 15 mg bid from 7.5.          Current Outpatient Medications:   •  aspirin 81 MG EC tablet, Take 1 tablet by mouth Daily., Disp: 30 tablet, Rfl: 0  •  Cholecalciferol (VITAMIN D PO), Take 1,000 Units by mouth Daily., Disp: , Rfl:   •  citalopram (CeleXA) 20 MG tablet, Take 1 tablet by mouth Daily., Disp: 30 tablet, Rfl: 2  •  Multiple Vitamins-Minerals (MULTIVITAMIN WOMEN PO), Take 1 tablet by mouth Daily., Disp: , Rfl:   •  nitroglycerin (NITROSTAT) 0.4 MG SL tablet, Place 1 tablet under the tongue Every 5 (Five) Minutes As Needed for Chest Pain. Take no more than 3 doses in 15 minutes., Disp: 100 tablet, Rfl: 0  •  omeprazole (PRILOSEC) 20 MG capsule, Take 1 capsule by mouth every night at bedtime., Disp: 30 capsule, Rfl: 2  •  sodium-potassium-magnesium sulfates (SUPREP BOWEL PREP KIT) 17.5-3.13-1.6 GM/180ML solution oral solution, Take 1 bottle by mouth Take As Directed. Follow instructions that were mailed to your home. If you didn't receive these call [phone]., Disp: 2 bottle, Rfl: 0  •  vitamin B-12 (CYANOCOBALAMIN) 500 MCG tablet, Take 500 mcg by mouth Daily., Disp: , Rfl:   •  vitamin E 200 UNIT capsule, Take 200 Units by mouth Daily., Disp: , Rfl:   •  busPIRone (BUSPAR) 15 MG tablet, Take 1 tablet by mouth 2 (Two) Times a Day., Disp: 60 tablet,  "Rfl: 2  •  ketoconazole (NIZORAL) 2 % cream, Apply  topically to the appropriate area as directed Every 12 (Twelve) Hours for 10 days., Disp: 30 g, Rfl: 1    Return in about 3 months (around 2/26/2019), or if symptoms worsen or fail to improve, for Annual, Recheck.    Recent Results (from the past 168 hour(s))   POC Urinalysis Dipstick, Automated    Collection Time: 11/26/18  3:14 PM   Result Value Ref Range    Color Yellow Yellow, Straw, Dark Yellow, Giulia    Clarity, UA Clear Clear    Specific Gravity  1.010 1.005 - 1.030    pH, Urine 6.0 5.0 - 8.0    Leukocytes Negative Negative    Nitrite, UA Negative Negative    Protein, POC Negative Negative mg/dL    Glucose, UA Negative Negative, 1000 mg/dL (3+) mg/dL    Ketones, UA Negative Negative    Urobilinogen, UA Normal Normal    Bilirubin Negative Negative    Blood, UA Negative Negative         EXAMINATION: CT ANGIO ABDOMINAL AORTA BILAT ILIOFEM RUNOFF W WO  CONTRAST-      INDICATION: I73.9-Peripheral vascular disease, unspecified.     TECHNIQUE:  Axial CT data of the abdomen, pelvis and the lower  extremities were acquired helically before and following IV contrast per  CTA with \"runoff\" protocol.  MPR, MIP and 3-D volume rendered images  were generated and reviewed. The radiation dose reduction device was  turned on for each scan per the ALARA (As Low as Reasonably Achievable)  protocol     COMPARISONS:  CT abdomen/pelvis 04/04/2018.     FINDINGS:  VASCULAR: AORTA AND VISCERAL BRANCHES: Extensive  atherosclerosis is noted of the normal caliber abdominal aorta. Celiac  axis is patent with classic hepatic arterial supply. Patent SMA. Patent  single bilateral renal arteries. Patent JUNIOR.     RIGHT LOWER EXTREMITY: Diminutive but patent right common iliac artery  and atherosclerotic calcification of the internal iliac artery. The  external iliac artery is occluded with reconstitution by epigastric and  internal iliac collaterals at the inguinal canal. The common " femoral,  superficial femoral and profunda arteries are patent. There is patent  popliteal artery and tibioperoneal trunk. There is three-vessel runoff  to the right foot.     LEFT LOWER EXTREMITY: Atherosclerotic and narrowed common iliac artery.  Atherosclerotic and narrowed internal and external iliac arteries. The  common femoral artery is patent but atherosclerotic. The SFA and  profunda are patent. Patent popliteal artery and tibioperoneal trunk.   Three-vessel runoff noted to the left foot.     NONVASCULAR:  Coronary artery disease incidentally noted. Status post  hysterectomy. Prominent simple parapelvic right renal cyst.     IMPRESSION:  1. Aortic and iliofemoral atherosclerosis. Occluded right external iliac  artery.  2. Three-vessel runoff to the feet.     D:  11/06/2018  E:  11/06/2018     This report was finalized on 11/6/2018 11:13 AM by Osmar Hernández.

## 2018-11-27 PROBLEM — E78.49 OTHER HYPERLIPIDEMIA: Status: ACTIVE | Noted: 2018-11-27

## 2018-11-28 ENCOUNTER — TELEPHONE (OUTPATIENT)
Dept: INTERNAL MEDICINE | Facility: CLINIC | Age: 53
End: 2018-11-28

## 2018-11-28 NOTE — TELEPHONE ENCOUNTER
----- Message from Pooja CHANDLER MD sent at 11/27/2018  3:02 PM EST -----  Please call the patient regarding her abnormal result. Lipids are high, especially LDL given per patient has peripheral vascular disease.  Is there a reason the patient is not taking statins?  Goal LDL for patient should be less than 70.

## 2018-11-28 NOTE — TELEPHONE ENCOUNTER
Spoke with pt and she states she has had some body aches and doesn't feel well. Denies any fever. Advised that the injections could give her symptoms of the flu but should not give her the flu. Advised to alternate ibuprofen with tylenol every 4-6 hours and if she isn't feeling any better in a couple of days or gets worse to give us a call.

## 2018-11-29 ENCOUNTER — OFFICE VISIT (OUTPATIENT)
Dept: INTERNAL MEDICINE | Facility: CLINIC | Age: 53
End: 2018-11-29

## 2018-11-29 ENCOUNTER — TELEPHONE (OUTPATIENT)
Dept: INTERNAL MEDICINE | Facility: CLINIC | Age: 53
End: 2018-11-29

## 2018-11-29 VITALS
SYSTOLIC BLOOD PRESSURE: 150 MMHG | HEIGHT: 66 IN | HEART RATE: 76 BPM | TEMPERATURE: 98 F | BODY MASS INDEX: 19.13 KG/M2 | DIASTOLIC BLOOD PRESSURE: 86 MMHG | OXYGEN SATURATION: 98 % | WEIGHT: 119 LBS

## 2018-11-29 DIAGNOSIS — R68.89 FLU-LIKE SYMPTOMS: Primary | ICD-10-CM

## 2018-11-29 DIAGNOSIS — E78.49 OTHER HYPERLIPIDEMIA: ICD-10-CM

## 2018-11-29 DIAGNOSIS — I73.9 PERIPHERAL VASCULAR DISEASE WITH CLAUDICATION (HCC): Primary | ICD-10-CM

## 2018-11-29 DIAGNOSIS — J02.9 PHARYNGITIS, UNSPECIFIED ETIOLOGY: ICD-10-CM

## 2018-11-29 DIAGNOSIS — R05.9 COUGH: ICD-10-CM

## 2018-11-29 DIAGNOSIS — J01.40 ACUTE PANSINUSITIS, RECURRENCE NOT SPECIFIED: ICD-10-CM

## 2018-11-29 LAB
EXPIRATION DATE: NORMAL
EXPIRATION DATE: NORMAL
FLUAV AG NPH QL: NORMAL
FLUBV AG NPH QL: NORMAL
INTERNAL CONTROL: NORMAL
INTERNAL CONTROL: NORMAL
Lab: NORMAL
Lab: NORMAL
S PYO AG THROAT QL: NEGATIVE

## 2018-11-29 PROCEDURE — 99213 OFFICE O/P EST LOW 20 MIN: CPT | Performed by: FAMILY MEDICINE

## 2018-11-29 PROCEDURE — 87880 STREP A ASSAY W/OPTIC: CPT | Performed by: FAMILY MEDICINE

## 2018-11-29 PROCEDURE — 87804 INFLUENZA ASSAY W/OPTIC: CPT | Performed by: FAMILY MEDICINE

## 2018-11-29 RX ORDER — ATORVASTATIN CALCIUM 20 MG/1
20 TABLET, FILM COATED ORAL NIGHTLY
Qty: 30 TABLET | Refills: 3 | Status: SHIPPED | OUTPATIENT
Start: 2018-11-29 | End: 2019-05-30 | Stop reason: SDUPTHER

## 2018-11-29 RX ORDER — CLARITHROMYCIN 500 MG/1
500 TABLET, COATED ORAL 2 TIMES DAILY
Qty: 20 TABLET | Refills: 0 | Status: SHIPPED | OUTPATIENT
Start: 2018-11-29 | End: 2019-05-30

## 2018-11-29 NOTE — TELEPHONE ENCOUNTER
Pt was notified of results and she hasn't been put on a statin but would like to start one. Please review and advise.

## 2018-11-29 NOTE — PROGRESS NOTES
"Subjective   Sera Guidry is a 52 y.o. female.     Chief Complaint   Patient presents with   • possible flu     Since getting the Pnuemonia shot on 11/26/18, she has been feeling very bad with body aches, denies any fever, has been alternating ibuprofen with tylenol to help with aches. She has become now severely congested.       Visit Vitals  /86 (BP Location: Left arm, Patient Position: Sitting)   Pulse 76   Temp 98 °F (36.7 °C)   Ht 167.6 cm (66\")   Wt 54 kg (119 lb)   SpO2 98%   BMI 19.21 kg/m²         URI    This is a new problem. The current episode started in the past 7 days. The problem has been waxing and waning. There has been no fever. Associated symptoms include congestion, coughing, diarrhea, headaches (yesterday), a rash (improving with medication), rhinorrhea, sinus pain, sneezing, a sore throat and wheezing. Pertinent negatives include no abdominal pain, chest pain, dysuria, ear pain, joint pain, joint swelling, nausea, neck pain, plugged ear sensation, swollen glands or vomiting. She has tried decongestant (Dayquil) for the symptoms. The treatment provided no relief.        The following portions of the patient's history were reviewed and updated as appropriate: allergies, current medications, past family history, past medical history, past social history, past surgical history and problem list.    Past Medical History:   Diagnosis Date   • Allergy    • Anxiety    • Depression    • Endometriosis    • GERD (gastroesophageal reflux disease)    • H/O echocardiogram 04/17/2018    EF 60% mild TR   • Other hyperlipidemia 11/27/2018   • Ovarian cancer (CMS/HCC)     endometrial/cervical   • Ovarian cyst    • Scoliosis       Past Surgical History:   Procedure Laterality Date   • APPENDECTOMY     • COLONOSCOPY  09/11/2018    Dr Patrick   • HYSTERECTOMY     • ORIF WRIST FRACTURE Left 09/2017    with 2 plates   • TOTAL ABDOMINAL HYSTERECTOMY WITH SALPINGO OOPHORECTOMY      ovaries removed at 2 " separate occcasions      Family History   Problem Relation Age of Onset   • Allergies Other    • ADD / ADHD Other    • Heart disease Other         cardiac disorder   • Stroke Other    • Depression Other    • Hypertension Other    • Lung cancer Other    • Uterine cancer Other    • Ovarian cancer Other    • Cancer Mother         left lung   • Stroke Mother         x 2   • Stroke Father         x 3   • Heart disease Father         heart attack   • No Known Problems Son    • No Known Problems Son    • No Known Problems Son       Social History     Socioeconomic History   • Marital status:      Spouse name: Not on file   • Number of children: 3   • Years of education: Not on file   • Highest education level: Not on file   Social Needs   • Financial resource strain: Not on file   • Food insecurity - worry: Not on file   • Food insecurity - inability: Not on file   • Transportation needs - medical: Not on file   • Transportation needs - non-medical: Not on file   Occupational History   • Occupation: "[employer]   Tobacco Use   • Smoking status: Current Every Day Smoker     Packs/day: 0.50     Years: 40.00     Pack years: 20.00     Types: Cigarettes   • Smokeless tobacco: Never Used   Substance and Sexual Activity   • Alcohol use: Yes     Alcohol/week: 1.2 - 1.8 oz     Types: 2 - 3 Cans of beer per week   • Drug use: No     Comment: quit MJ   • Sexual activity: Defer   Other Topics Concern   • Not on file   Social History Narrative    Lives in [facility] with [place] .        Review of Systems   Constitutional: Positive for chills, diaphoresis and fatigue. Negative for fever.   HENT: Positive for congestion, rhinorrhea, sinus pain, sneezing, sore throat and trouble swallowing. Negative for ear pain, nosebleeds, postnasal drip and sinus pressure.    Eyes: Negative.  Negative for redness and itching.   Respiratory: Positive for cough, shortness of breath and wheezing.    Cardiovascular: Negative.  Negative for  chest pain and palpitations.   Gastrointestinal: Positive for diarrhea. Negative for abdominal pain, constipation, nausea and vomiting.   Endocrine: Positive for cold intolerance and heat intolerance.   Genitourinary: Negative.  Negative for dysuria, frequency, hematuria and urgency.   Musculoskeletal: Positive for back pain. Negative for arthralgias, joint pain and neck pain.   Skin: Positive for rash (improving with medication). Negative for color change.   Allergic/Immunologic: Negative.  Negative for environmental allergies.   Neurological: Positive for dizziness and headaches (yesterday). Negative for syncope and light-headedness.   Hematological: Negative.  Negative for adenopathy. Does not bruise/bleed easily.   Psychiatric/Behavioral: Positive for dysphoric mood. The patient is nervous/anxious.         Pt is stable with depression and anxiety on medication       Objective   Physical Exam   Constitutional: She is oriented to person, place, and time. She appears well-developed.   HENT:   Head: Normocephalic.   Right Ear: Tympanic membrane, external ear and ear canal normal.   Left Ear: Tympanic membrane, external ear and ear canal normal.   Nose: Rhinorrhea present. Right sinus exhibits maxillary sinus tenderness and frontal sinus tenderness. Left sinus exhibits maxillary sinus tenderness and frontal sinus tenderness.   Mouth/Throat: Uvula is midline and mucous membranes are normal. Posterior oropharyngeal edema and posterior oropharyngeal erythema present. No oropharyngeal exudate.   Eyes: Conjunctivae, EOM and lids are normal. Pupils are equal, round, and reactive to light.   Neck: Trachea normal and normal range of motion. Neck supple. Carotid bruit is not present. No thyroid mass and no thyromegaly present.   Cardiovascular: Normal rate and regular rhythm.   No murmur heard.  Pulmonary/Chest: Effort normal and breath sounds normal. No respiratory distress. She has no decreased breath sounds. She has no  wheezes. She has no rhonchi. She has no rales. She exhibits no tenderness.   Abdominal: Soft. Bowel sounds are normal. There is no tenderness.   Musculoskeletal: Normal range of motion.   Neurological: She is alert and oriented to person, place, and time.   Skin: Skin is warm and dry.   Psychiatric: She has a normal mood and affect. Her behavior is normal.   Nursing note and vitals reviewed.      Assessment/Plan   Sera was seen today for possible flu.    Diagnoses and all orders for this visit:    Flu-like symptoms  -     POCT Influenza A/B    Cough    Pharyngitis, unspecified etiology  -     POC Rapid Strep A    Acute pansinusitis, recurrence not specified    Other orders  -     clarithromycin (BIAXIN) 500 MG tablet; Take 1 tablet by mouth 2 (Two) Times a Day.      lipitor was sent to pharmacy earlier today,  Hold lipitor and citalopram while on biaxin           Current Outpatient Medications:   •  aspirin 81 MG EC tablet, Take 1 tablet by mouth Daily., Disp: 30 tablet, Rfl: 0  •  atorvastatin (LIPITOR) 20 MG tablet, Take 1 tablet by mouth Every Night., Disp: 30 tablet, Rfl: 3  •  busPIRone (BUSPAR) 15 MG tablet, Take 1 tablet by mouth 2 (Two) Times a Day., Disp: 60 tablet, Rfl: 2  •  Cholecalciferol (VITAMIN D PO), Take 1,000 Units by mouth Daily., Disp: , Rfl:   •  citalopram (CeleXA) 20 MG tablet, Take 1 tablet by mouth Daily., Disp: 30 tablet, Rfl: 2  •  clarithromycin (BIAXIN) 500 MG tablet, Take 1 tablet by mouth 2 (Two) Times a Day., Disp: 20 tablet, Rfl: 0  •  ketoconazole (NIZORAL) 2 % cream, Apply  topically to the appropriate area as directed Every 12 (Twelve) Hours for 10 days., Disp: 30 g, Rfl: 1  •  Multiple Vitamins-Minerals (MULTIVITAMIN WOMEN PO), Take 1 tablet by mouth Daily., Disp: , Rfl:   •  nitroglycerin (NITROSTAT) 0.4 MG SL tablet, Place 1 tablet under the tongue Every 5 (Five) Minutes As Needed for Chest Pain. Take no more than 3 doses in 15 minutes., Disp: 100 tablet, Rfl: 0  •   omeprazole (PRILOSEC) 20 MG capsule, Take 1 capsule by mouth every night at bedtime., Disp: 30 capsule, Rfl: 2  •  sodium-potassium-magnesium sulfates (SUPREP BOWEL PREP KIT) 17.5-3.13-1.6 GM/180ML solution oral solution, Take 1 bottle by mouth Take As Directed. Follow instructions that were mailed to your home. If you didn't receive these call [phone]., Disp: 2 bottle, Rfl: 0  •  vitamin B-12 (CYANOCOBALAMIN) 500 MCG tablet, Take 500 mcg by mouth Daily., Disp: , Rfl:   •  vitamin E 200 UNIT capsule, Take 200 Units by mouth Daily., Disp: , Rfl:     Return if symptoms worsen or fail to improve, for Recheck, Next scheduled follow up.    Recent Results (from the past 168 hour(s))   Comprehensive Metabolic Panel    Collection Time: 11/26/18  3:11 PM   Result Value Ref Range    Glucose 85 70 - 100 mg/dL    BUN 15 9 - 23 mg/dL    Creatinine 0.68 0.60 - 1.30 mg/dL    Sodium 137 132 - 146 mmol/L    Potassium 5.1 3.5 - 5.5 mmol/L    Chloride 107 99 - 109 mmol/L    CO2 26.0 20.0 - 31.0 mmol/L    Calcium 9.4 8.7 - 10.4 mg/dL    Total Protein 7.1 5.7 - 8.2 g/dL    Albumin 4.82 (H) 3.20 - 4.80 g/dL    ALT (SGPT) 33 7 - 40 U/L    AST (SGOT) 25 0 - 33 U/L    Alkaline Phosphatase 88 25 - 100 U/L    Total Bilirubin 0.3 0.3 - 1.2 mg/dL    eGFR Non African Amer 91 >60 mL/min/1.73    Globulin 2.3 gm/dL    A/G Ratio 2.1 1.5 - 2.5 g/dL    BUN/Creatinine Ratio 22.1 7.0 - 25.0    Anion Gap 4.0 3.0 - 11.0 mmol/L   TSH    Collection Time: 11/26/18  3:11 PM   Result Value Ref Range    TSH 1.837 0.350 - 5.350 mIU/mL   T4, Free    Collection Time: 11/26/18  3:11 PM   Result Value Ref Range    Free T4 1.05 0.89 - 1.76 ng/dL   Vitamin B12    Collection Time: 11/26/18  3:11 PM   Result Value Ref Range    Vitamin B-12 566 211 - 911 pg/mL   Folate    Collection Time: 11/26/18  3:11 PM   Result Value Ref Range    Folate 7.94 3.20 - 20.00 ng/mL   Vitamin D 25 Hydroxy    Collection Time: 11/26/18  3:11 PM   Result Value Ref Range    25 Hydroxy,  Vitamin D 30.0 ng/ml   Lipid Panel    Collection Time: 11/26/18  3:11 PM   Result Value Ref Range    Total Cholesterol 249 (H) 0 - 200 mg/dL    Triglycerides 132 0 - 150 mg/dL    HDL Cholesterol >115 (H) 40 - 60 mg/dL    LDL Cholesterol  124 0 - 130 mg/dL   CBC Auto Differential    Collection Time: 11/26/18  3:11 PM   Result Value Ref Range    WBC 10.47 3.50 - 10.80 10*3/mm3    RBC 4.64 3.89 - 5.14 10*6/mm3    Hemoglobin 15.6 (H) 11.5 - 15.5 g/dL    Hematocrit 46.4 (H) 34.5 - 44.0 %    .0 (H) 80.0 - 99.0 fL    MCH 33.6 (H) 27.0 - 31.0 pg    MCHC 33.6 32.0 - 36.0 g/dL    RDW 13.9 11.3 - 14.5 %    RDW-SD 50.8 37.0 - 54.0 fl    MPV 9.5 6.0 - 12.0 fL    Platelets 338 150 - 450 10*3/mm3    Neutrophil % 51.4 41.0 - 71.0 %    Lymphocyte % 37.9 24.0 - 44.0 %    Monocyte % 6.6 0.0 - 12.0 %    Eosinophil % 3.1 (H) 0.0 - 3.0 %    Basophil % 1.0 0.0 - 1.0 %    Immature Grans % 0.3 0.0 - 0.6 %    Neutrophils, Absolute 5.39 1.50 - 8.30 10*3/mm3    Lymphocytes, Absolute 3.97 0.60 - 4.80 10*3/mm3    Monocytes, Absolute 0.69 0.00 - 1.00 10*3/mm3    Eosinophils, Absolute 0.32 (H) 0.00 - 0.30 10*3/mm3    Basophils, Absolute 0.10 0.00 - 0.20 10*3/mm3    Immature Grans, Absolute 0.03 0.00 - 0.03 10*3/mm3   POC Urinalysis Dipstick, Automated    Collection Time: 11/26/18  3:14 PM   Result Value Ref Range    Color Yellow Yellow, Straw, Dark Yellow, Giulia    Clarity, UA Clear Clear    Specific Gravity  1.010 1.005 - 1.030    pH, Urine 6.0 5.0 - 8.0    Leukocytes Negative Negative    Nitrite, UA Negative Negative    Protein, POC Negative Negative mg/dL    Glucose, UA Negative Negative, 1000 mg/dL (3+) mg/dL    Ketones, UA Negative Negative    Urobilinogen, UA Normal Normal    Bilirubin Negative Negative    Blood, UA Negative Negative   POCT Influenza A/B    Collection Time: 11/29/18  2:21 PM   Result Value Ref Range    Rapid Influenza A Ag neg Negative    Rapid Influenza B Ag neg Negative    Internal Control Passed Passed    Lot  Number [id number]     Expiration Date 12,132,020    POC Rapid Strep A    Collection Time: 11/29/18  2:59 PM   Result Value Ref Range    Rapid Strep A Screen Negative Negative, VALID, INVALID, Not Performed    Internal Control Passed Passed    Lot Number [id number]     Expiration Date 3,312,020

## 2018-11-30 LAB — VIT B6 SERPL-MCNC: 11.8 UG/L (ref 2–32.8)

## 2019-05-30 ENCOUNTER — OFFICE VISIT (OUTPATIENT)
Dept: INTERNAL MEDICINE | Facility: CLINIC | Age: 54
End: 2019-05-30

## 2019-05-30 VITALS
OXYGEN SATURATION: 96 % | BODY MASS INDEX: 19.35 KG/M2 | HEART RATE: 79 BPM | SYSTOLIC BLOOD PRESSURE: 150 MMHG | DIASTOLIC BLOOD PRESSURE: 70 MMHG | HEIGHT: 66 IN | WEIGHT: 120.4 LBS | TEMPERATURE: 97.3 F

## 2019-05-30 DIAGNOSIS — E78.49 OTHER HYPERLIPIDEMIA: ICD-10-CM

## 2019-05-30 DIAGNOSIS — H10.33 ACUTE BACTERIAL CONJUNCTIVITIS OF BOTH EYES: ICD-10-CM

## 2019-05-30 DIAGNOSIS — R09.89 CHEST RALES: Primary | ICD-10-CM

## 2019-05-30 DIAGNOSIS — R05.9 COUGH: ICD-10-CM

## 2019-05-30 DIAGNOSIS — F33.41 RECURRENT MAJOR DEPRESSIVE DISORDER, IN PARTIAL REMISSION (HCC): ICD-10-CM

## 2019-05-30 DIAGNOSIS — I73.9 PERIPHERAL VASCULAR DISEASE WITH CLAUDICATION (HCC): ICD-10-CM

## 2019-05-30 DIAGNOSIS — K21.9 GASTROESOPHAGEAL REFLUX DISEASE, ESOPHAGITIS PRESENCE NOT SPECIFIED: ICD-10-CM

## 2019-05-30 DIAGNOSIS — F41.9 ANXIETY: ICD-10-CM

## 2019-05-30 PROCEDURE — 99213 OFFICE O/P EST LOW 20 MIN: CPT | Performed by: FAMILY MEDICINE

## 2019-05-30 RX ORDER — ATORVASTATIN CALCIUM 20 MG/1
20 TABLET, FILM COATED ORAL NIGHTLY
Qty: 30 TABLET | Refills: 3 | Status: SHIPPED | OUTPATIENT
Start: 2019-05-30 | End: 2019-11-11 | Stop reason: SDUPTHER

## 2019-05-30 RX ORDER — CIPROFLOXACIN HYDROCHLORIDE 3.5 MG/ML
1 SOLUTION/ DROPS TOPICAL 4 TIMES DAILY
Qty: 2.5 ML | Refills: 0 | Status: SHIPPED | OUTPATIENT
Start: 2019-05-30 | End: 2019-11-11

## 2019-05-30 RX ORDER — BENZONATATE 100 MG/1
100 CAPSULE ORAL 3 TIMES DAILY PRN
Qty: 30 CAPSULE | Refills: 0 | Status: SHIPPED | OUTPATIENT
Start: 2019-05-30 | End: 2019-11-11

## 2019-05-30 RX ORDER — DOXYCYCLINE 100 MG/1
100 CAPSULE ORAL 2 TIMES DAILY
Qty: 20 CAPSULE | Refills: 0 | Status: SHIPPED | OUTPATIENT
Start: 2019-05-30 | End: 2019-07-12 | Stop reason: SDUPTHER

## 2019-05-30 RX ORDER — BUSPIRONE HYDROCHLORIDE 15 MG/1
15 TABLET ORAL 2 TIMES DAILY
Qty: 60 TABLET | Refills: 2 | Status: SHIPPED | OUTPATIENT
Start: 2019-05-30 | End: 2019-11-11 | Stop reason: SDUPTHER

## 2019-05-30 RX ORDER — CITALOPRAM 20 MG/1
20 TABLET ORAL DAILY
Qty: 30 TABLET | Refills: 2 | Status: SHIPPED | OUTPATIENT
Start: 2019-05-30 | End: 2019-11-11 | Stop reason: SDUPTHER

## 2019-05-30 RX ORDER — OMEPRAZOLE 20 MG/1
20 CAPSULE, DELAYED RELEASE ORAL
Qty: 30 CAPSULE | Refills: 2 | Status: SHIPPED | OUTPATIENT
Start: 2019-05-30 | End: 2019-11-11 | Stop reason: SDUPTHER

## 2019-05-30 NOTE — PROGRESS NOTES
"Subjective   Sera Guidry is a 53 y.o. female.     Chief Complaint   Patient presents with   • Sinus Problem     x6 days   • Nasal Congestion   • Eye Drainage     waking up in the morning and her eyes were matted together.   • URI       Visit Vitals  /70 (Cuff Size: Adult)   Pulse 79   Temp 97.3 °F (36.3 °C)   Ht 167.6 cm (66\")   Wt 54.6 kg (120 lb 6.4 oz)   SpO2 96%   BMI 19.43 kg/m²         Sinus Problem   This is a new problem. The current episode started in the past 7 days. The problem is unchanged. The maximum temperature recorded prior to her arrival was 102 - 102.9 F. The pain is mild. Associated symptoms include congestion, coughing, diaphoresis, shortness of breath, sinus pressure and a sore throat. Pertinent negatives include no chills, ear pain, headaches, hoarse voice, neck pain, sneezing or swollen glands. Past treatments include oral decongestants. The treatment provided no relief.   URI    This is a new problem. The current episode started in the past 7 days. The problem has been unchanged. The maximum temperature recorded prior to her arrival was 102 - 102.9 F. Associated symptoms include congestion, coughing, diarrhea, nausea, rhinorrhea, sinus pain (ethmoid and frontal), a sore throat, vomiting and wheezing. Pertinent negatives include no abdominal pain, chest pain, dysuria, ear pain, headaches, joint pain, joint swelling, neck pain, plugged ear sensation, rash, sneezing or swollen glands. She has tried antihistamine, decongestant and inhaler use for the symptoms. The treatment provided mild relief.   Cough   This is a new problem. The current episode started in the past 7 days. The problem has been unchanged. The problem occurs every few minutes. The cough is productive of sputum. Associated symptoms include eye redness, a fever (102, which improved), myalgias, nasal congestion, postnasal drip, rhinorrhea, a sore throat, shortness of breath, sweats and wheezing. Pertinent negatives " include no chest pain, chills, ear congestion, ear pain, headaches, heartburn, hemoptysis, rash or weight loss. The symptoms are aggravated by fumes (perfumes, food odors). Risk factors for lung disease include smoking/tobacco exposure. She has tried OTC cough suppressant and a beta-agonist inhaler for the symptoms. The treatment provided no relief. There is no history of asthma, bronchitis, COPD, emphysema, environmental allergies or pneumonia.      Pt is out of maintenance meds. She lost her [employer] and just got it back. Pt has been out of maintenance meds for several months.   The following portions of the patient's history were reviewed and updated as appropriate: allergies, current medications, past family history, past medical history, past social history, past surgical history and problem list.    Past Medical History:   Diagnosis Date   • Allergy    • Anxiety    • Depression    • Endometriosis    • GERD (gastroesophageal reflux disease)    • H/O echocardiogram 04/17/2018    EF 60% mild TR   • Other hyperlipidemia 11/27/2018   • Ovarian cancer (CMS/HCC)     endometrial/cervical   • Ovarian cyst    • Scoliosis       Past Surgical History:   Procedure Laterality Date   • APPENDECTOMY     • COLONOSCOPY  09/11/2018    Dr Patrick   • HYSTERECTOMY     • ORIF WRIST FRACTURE Left 09/2017    with 2 plates   • TOTAL ABDOMINAL HYSTERECTOMY WITH SALPINGO OOPHORECTOMY      ovaries removed at 2 separate occcasions      Family History   Problem Relation Age of Onset   • Allergies Other    • ADD / ADHD Other    • Heart disease Other         cardiac disorder   • Stroke Other    • Depression Other    • Hypertension Other    • Lung cancer Other    • Uterine cancer Other    • Ovarian cancer Other    • Cancer Mother         left lung   • Stroke Mother         x 2   • Stroke Father         x 3   • Heart disease Father         heart attack   • No Known Problems Son    • No Known Problems Son    • No Known Problems Son        Social History     Socioeconomic History   • Marital status:      Spouse name: Not on file   • Number of children: 3   • Years of education: Not on file   • Highest education level: Not on file   Occupational History   • Occupation: [employer]   Tobacco Use   • Smoking status: Current Every Day Smoker     Packs/day: 0.50     Years: 40.00     Pack years: 20.00     Types: Cigarettes   • Smokeless tobacco: Never Used   Substance and Sexual Activity   • Alcohol use: Yes     Alcohol/week: 1.2 - 1.8 oz     Types: 2 - 3 Cans of beer per week   • Drug use: No     Comment: quit MJ   • Sexual activity: Defer   Social History Narrative    Lives in [facility] with her .      Allergies   Allergen Reactions   • Penicillins Hives       Review of Systems   Constitutional: Positive for diaphoresis, fatigue and fever (102, which improved). Negative for chills and weight loss.   HENT: Positive for congestion, postnasal drip, rhinorrhea, sinus pressure, sinus pain (ethmoid and frontal) and sore throat. Negative for ear pain, hoarse voice, nosebleeds and sneezing.    Eyes: Positive for discharge, redness and itching.   Respiratory: Positive for cough, shortness of breath and wheezing. Negative for hemoptysis.    Cardiovascular: Negative.  Negative for chest pain and palpitations.   Gastrointestinal: Positive for diarrhea, nausea and vomiting. Negative for abdominal pain, constipation and heartburn.   Endocrine: Negative.  Negative for cold intolerance and heat intolerance.   Genitourinary: Positive for frequency. Negative for dysuria, hematuria and urgency.   Musculoskeletal: Positive for myalgias. Negative for arthralgias, back pain, joint pain and neck pain.   Skin: Negative.  Negative for color change and rash.   Allergic/Immunologic: Negative.  Negative for environmental allergies.   Neurological: Positive for dizziness. Negative for syncope, light-headedness and headaches.   Hematological: Negative.  Negative for  adenopathy. Does not bruise/bleed easily.   Psychiatric/Behavioral: Positive for dysphoric mood. The patient is nervous/anxious.        Objective   Physical Exam   Constitutional: She is oriented to person, place, and time. She appears well-developed.   HENT:   Head: Normocephalic.   Right Ear: Tympanic membrane, external ear and ear canal normal.   Left Ear: Tympanic membrane, external ear and ear canal normal.   Nose: Right sinus exhibits maxillary sinus tenderness and frontal sinus tenderness. Left sinus exhibits maxillary sinus tenderness and frontal sinus tenderness.   Mouth/Throat: Uvula is midline and mucous membranes are normal. Posterior oropharyngeal erythema present. No oropharyngeal exudate or posterior oropharyngeal edema.   Eyes: Conjunctivae, EOM and lids are normal. Pupils are equal, round, and reactive to light.   Neck: Trachea normal and normal range of motion. Neck supple. No thyroid mass and no thyromegaly present.   Cardiovascular: Normal rate and regular rhythm.   No murmur heard.  Pulmonary/Chest: Effort normal. No respiratory distress. She has no decreased breath sounds. She has no wheezes. She has no rhonchi. She has rales (left base). She exhibits no tenderness.   Abdominal: Soft. Bowel sounds are normal. There is no tenderness.   Musculoskeletal: Normal range of motion.   Neurological: She is alert and oriented to person, place, and time.   Skin: Skin is warm and dry.   Psychiatric: She has a normal mood and affect. Her behavior is normal.   Nursing note and vitals reviewed.      Assessment/Plan   Sera was seen today for sinus problem, nasal congestion, eye drainage and uri.    Diagnoses and all orders for this visit:    Chest rales  -     XR Chest PA & Lateral    Anxiety  -     busPIRone (BUSPAR) 15 MG tablet; Take 1 tablet by mouth 2 (Two) Times a Day.    Recurrent major depressive disorder, in partial remission (CMS/HCC)  -     citalopram (CeleXA) 20 MG tablet; Take 1 tablet by mouth  Daily.    Gastroesophageal reflux disease, esophagitis presence not specified  -     omeprazole (PRILOSEC) 20 MG capsule; Take 1 capsule by mouth every night at bedtime.    Peripheral vascular disease with claudication (CMS/HCC)  -     atorvastatin (LIPITOR) 20 MG tablet; Take 1 tablet by mouth Every Night.    Other hyperlipidemia  -     atorvastatin (LIPITOR) 20 MG tablet; Take 1 tablet by mouth Every Night.    Acute bacterial conjunctivitis of both eyes  -     ciprofloxacin (CILOXAN) 0.3 % ophthalmic solution; Administer 1 drop to both eyes 4 (Four) Times a Day.    Cough  -     XR Chest PA & Lateral  -     benzonatate (TESSALON) 100 MG capsule; Take 1 capsule by mouth 3 (Three) Times a Day As Needed for Cough.    Other orders  -     doxycycline (MONODOX) 100 MG capsule; Take 1 capsule by mouth 2 (Two) Times a Day.      Suspect pneumonia left base             Current Outpatient Medications:   •  aspirin 81 MG EC tablet, Take 1 tablet by mouth Daily., Disp: 30 tablet, Rfl: 0  •  atorvastatin (LIPITOR) 20 MG tablet, Take 1 tablet by mouth Every Night., Disp: 30 tablet, Rfl: 3  •  busPIRone (BUSPAR) 15 MG tablet, Take 1 tablet by mouth 2 (Two) Times a Day., Disp: 60 tablet, Rfl: 2  •  Cholecalciferol (VITAMIN D PO), Take 1,000 Units by mouth Daily., Disp: , Rfl:   •  citalopram (CeleXA) 20 MG tablet, Take 1 tablet by mouth Daily., Disp: 30 tablet, Rfl: 2  •  Multiple Vitamins-Minerals (MULTIVITAMIN WOMEN PO), Take 1 tablet by mouth Daily., Disp: , Rfl:   •  nitroglycerin (NITROSTAT) 0.4 MG SL tablet, Place 1 tablet under the tongue Every 5 (Five) Minutes As Needed for Chest Pain. Take no more than 3 doses in 15 minutes., Disp: 100 tablet, Rfl: 0  •  omeprazole (PRILOSEC) 20 MG capsule, Take 1 capsule by mouth every night at bedtime., Disp: 30 capsule, Rfl: 2  •  vitamin B-12 (CYANOCOBALAMIN) 500 MCG tablet, Take 500 mcg by mouth Daily., Disp: , Rfl:   •  vitamin E 200 UNIT capsule, Take 200 Units by mouth Daily.,  Disp: , Rfl:   •  benzonatate (TESSALON) 100 MG capsule, Take 1 capsule by mouth 3 (Three) Times a Day As Needed for Cough., Disp: 30 capsule, Rfl: 0  •  ciprofloxacin (CILOXAN) 0.3 % ophthalmic solution, Administer 1 drop to both eyes 4 (Four) Times a Day., Disp: 2.5 mL, Rfl: 0  •  doxycycline (MONODOX) 100 MG capsule, Take 1 capsule by mouth 2 (Two) Times a Day., Disp: 20 capsule, Rfl: 0    Return in about 1 week (around 6/6/2019), or if symptoms worsen or fail to improve, for Recheck.

## 2019-07-12 ENCOUNTER — OFFICE VISIT (OUTPATIENT)
Dept: INTERNAL MEDICINE | Facility: CLINIC | Age: 54
End: 2019-07-12

## 2019-07-12 VITALS
TEMPERATURE: 98.5 F | DIASTOLIC BLOOD PRESSURE: 88 MMHG | HEART RATE: 70 BPM | BODY MASS INDEX: 19.11 KG/M2 | WEIGHT: 118.4 LBS | OXYGEN SATURATION: 98 % | SYSTOLIC BLOOD PRESSURE: 190 MMHG

## 2019-07-12 DIAGNOSIS — I99.8 POORLY CONTROLLED BLOOD PRESSURE: Primary | ICD-10-CM

## 2019-07-12 DIAGNOSIS — R05.9 COUGH: ICD-10-CM

## 2019-07-12 DIAGNOSIS — F17.210 CIGARETTE SMOKER: ICD-10-CM

## 2019-07-12 PROCEDURE — 99213 OFFICE O/P EST LOW 20 MIN: CPT | Performed by: FAMILY MEDICINE

## 2019-07-12 RX ORDER — DOXYCYCLINE 100 MG/1
100 CAPSULE ORAL 2 TIMES DAILY
Qty: 20 CAPSULE | Refills: 0 | Status: SHIPPED | OUTPATIENT
Start: 2019-07-12 | End: 2019-11-11

## 2019-07-12 NOTE — PROGRESS NOTES
Subjective   Sera Guidry is a 53 y.o. female.     Chief Complaint   Patient presents with   • Cough   • Diarrhea       Visit Vitals  BP (!) 190/88 (BP Location: Right arm, Patient Position: Sitting, Cuff Size: Adult)   Pulse 70   Temp 98.5 °F (36.9 °C)   Wt 53.7 kg (118 lb 6.4 oz)   SpO2 98%   BMI 19.11 kg/m²       Vitals:    07/12/19 1330 07/12/19 1333 07/12/19 1339   BP: (!) 185/85 (!) 190/90 (!) 190/88   BP Location: Left arm Left arm Right arm   Patient Position: Sitting  Sitting   Cuff Size: Adult  Adult   Pulse: 70     Temp: 98.5 °F (36.9 °C)     SpO2: 98%     Weight: 53.7 kg (118 lb 6.4 oz)       Vitals:    07/12/19 1330 07/12/19 1333 07/12/19 1339   BP: (!) 185/85 (!) 190/90 (!) 190/88   BP Location: Left arm Left arm Right arm   Patient Position: Sitting  Sitting   Cuff Size: Adult  Adult   Pulse: 70     Temp: 98.5 °F (36.9 °C)     SpO2: 98%     Weight: 53.7 kg (118 lb 6.4 oz)       Cough   This is a new problem. The current episode started in the past 7 days (4-5 days ago). The problem has been unchanged. The problem occurs every few minutes. The cough is productive of sputum. Associated symptoms include chills, headaches (base of skull), postnasal drip, rhinorrhea, a sore throat (mild), shortness of breath, sweats and wheezing. Pertinent negatives include no chest pain, ear congestion, ear pain, fever, heartburn, hemoptysis, myalgias, nasal congestion, rash or weight loss. The symptoms are aggravated by fumes. She has tried nothing for the symptoms. The treatment provided no relief. There is no history of asthma, bronchiectasis, bronchitis, COPD, emphysema, environmental allergies or pneumonia.   Diarrhea    This is a new problem. The current episode started yesterday. The problem has been resolved. Associated symptoms include chills, coughing, headaches (base of skull), increased flatus, sweats and vomiting (yesterday). Pertinent negatives include no abdominal pain, arthralgias, bloating, fever,  myalgias, URI or weight loss. Nothing aggravates the symptoms. There are no known risk factors. She has tried nothing for the symptoms. The treatment provided significant relief. There is no history of bowel resection, inflammatory bowel disease, irritable bowel syndrome, malabsorption, a recent abdominal surgery or short gut syndrome.        The following portions of the patient's history were reviewed and updated as appropriate: allergies, current medications, past family history, past medical history, past social history, past surgical history and problem list.    Past Medical History:   Diagnosis Date   • Allergy    • Anxiety    • Depression    • Endometriosis    • GERD (gastroesophageal reflux disease)    • H/O echocardiogram 04/17/2018    EF 60% mild TR   • Other hyperlipidemia 11/27/2018   • Ovarian cancer (CMS/HCC)     endometrial/cervical   • Ovarian cyst    • Scoliosis       Past Surgical History:   Procedure Laterality Date   • APPENDECTOMY     • COLONOSCOPY  09/11/2018    Dr Patrick   • HYSTERECTOMY     • ORIF WRIST FRACTURE Left 09/2017    with 2 plates   • TOTAL ABDOMINAL HYSTERECTOMY WITH SALPINGO OOPHORECTOMY      ovaries removed at 2 separate occcasions      Family History   Problem Relation Age of Onset   • Allergies Other    • ADD / ADHD Other    • Heart disease Other         cardiac disorder   • Stroke Other    • Depression Other    • Hypertension Other    • Lung cancer Other    • Uterine cancer Other    • Ovarian cancer Other    • Cancer Mother         left lung   • Stroke Mother         x 2   • Stroke Father         x 3   • Heart disease Father         heart attack   • No Known Problems Son    • No Known Problems Son    • No Known Problems Son       Social History     Socioeconomic History   • Marital status:      Spouse name: Not on file   • Number of children: 3   • Years of education: Not on file   • Highest education level: Not on file   Occupational History   • Occupation:  [employer]   Tobacco Use   • Smoking status: Current Every Day Smoker     Packs/day: 0.50     Years: 40.00     Pack years: 20.00     Types: Cigarettes   • Smokeless tobacco: Never Used   Substance and Sexual Activity   • Alcohol use: Yes     Alcohol/week: 1.2 - 1.8 oz     Types: 2 - 3 Cans of beer per week   • Drug use: No     Comment: quit MJ   • Sexual activity: Defer   Social History Narrative    Lives in [facility] with her .      Allergies   Allergen Reactions   • Penicillins Hives       Review of Systems   Constitutional: Positive for chills. Negative for fever and weight loss.   HENT: Positive for congestion, postnasal drip, rhinorrhea, sinus pressure, sneezing and sore throat (mild). Negative for ear pain and hearing loss.    Respiratory: Positive for cough, shortness of breath and wheezing. Negative for hemoptysis.    Cardiovascular: Negative for chest pain and palpitations.   Gastrointestinal: Positive for diarrhea (yesterday, resolved), flatus and vomiting (yesterday). Negative for abdominal pain, bloating and heartburn.   Endocrine: Positive for polydipsia. Negative for cold intolerance, heat intolerance, polyphagia and polyuria.   Musculoskeletal: Positive for back pain (chronic). Negative for arthralgias and myalgias.   Skin: Negative for rash.   Allergic/Immunologic: Negative for environmental allergies.   Neurological: Positive for dizziness and headaches (base of skull).   Psychiatric/Behavioral: Positive for dysphoric mood. The patient is nervous/anxious.         Depression and anxiety stable since back on buspar,       Objective   Physical Exam   Constitutional: She is oriented to person, place, and time. She appears well-developed.   HENT:   Head: Normocephalic.   Right Ear: Tympanic membrane, external ear and ear canal normal.   Left Ear: Tympanic membrane, external ear and ear canal normal.   Nose: Rhinorrhea present. Right sinus exhibits frontal sinus tenderness. Left sinus exhibits  frontal sinus tenderness.   Mouth/Throat: Oropharynx is clear and moist and mucous membranes are normal. No oropharyngeal exudate, posterior oropharyngeal edema or posterior oropharyngeal erythema.   Eyes: Conjunctivae, EOM and lids are normal. Pupils are equal, round, and reactive to light.   Neck: Trachea normal and normal range of motion. Neck supple. Carotid bruit is not present. No thyroid mass and no thyromegaly present.   Cardiovascular: Normal rate and regular rhythm.   No murmur heard.  Pulmonary/Chest: Effort normal. No respiratory distress. She has no decreased breath sounds. She has no wheezes. She has rhonchi. She has rales in the right lower field. She exhibits no tenderness.   Abdominal: Soft. Bowel sounds are normal. There is no tenderness.   Musculoskeletal: Normal range of motion.   Neurological: She is alert and oriented to person, place, and time.   Skin: Skin is warm and dry.   Psychiatric: She has a normal mood and affect. Her behavior is normal.   Nursing note and vitals reviewed.      Assessment/Plan   Sera was seen today for cough and diarrhea.    Diagnoses and all orders for this visit:    Poorly controlled blood pressure    Cough  -     XR Chest PA & Lateral    Cigarette smoker    Other orders  -     doxycycline (MONODOX) 100 MG capsule; Take 1 capsule by mouth 2 (Two) Times a Day.        To ER for very high blood pressure           Current Outpatient Medications:   •  aspirin 81 MG EC tablet, Take 1 tablet by mouth Daily., Disp: 30 tablet, Rfl: 0  •  atorvastatin (LIPITOR) 20 MG tablet, Take 1 tablet by mouth Every Night., Disp: 30 tablet, Rfl: 3  •  benzonatate (TESSALON) 100 MG capsule, Take 1 capsule by mouth 3 (Three) Times a Day As Needed for Cough., Disp: 30 capsule, Rfl: 0  •  busPIRone (BUSPAR) 15 MG tablet, Take 1 tablet by mouth 2 (Two) Times a Day., Disp: 60 tablet, Rfl: 2  •  Cholecalciferol (VITAMIN D PO), Take 1,000 Units by mouth Daily., Disp: , Rfl:   •  ciprofloxacin  (CILOXAN) 0.3 % ophthalmic solution, Administer 1 drop to both eyes 4 (Four) Times a Day., Disp: 2.5 mL, Rfl: 0  •  citalopram (CeleXA) 20 MG tablet, Take 1 tablet by mouth Daily., Disp: 30 tablet, Rfl: 2  •  doxycycline (MONODOX) 100 MG capsule, Take 1 capsule by mouth 2 (Two) Times a Day., Disp: 20 capsule, Rfl: 0  •  Multiple Vitamins-Minerals (MULTIVITAMIN WOMEN PO), Take 1 tablet by mouth Daily., Disp: , Rfl:   •  nitroglycerin (NITROSTAT) 0.4 MG SL tablet, Place 1 tablet under the tongue Every 5 (Five) Minutes As Needed for Chest Pain. Take no more than 3 doses in 15 minutes., Disp: 100 tablet, Rfl: 0  •  omeprazole (PRILOSEC) 20 MG capsule, Take 1 capsule by mouth every night at bedtime., Disp: 30 capsule, Rfl: 2  •  vitamin B-12 (CYANOCOBALAMIN) 500 MCG tablet, Take 500 mcg by mouth Daily., Disp: , Rfl:   •  vitamin E 200 UNIT capsule, Take 200 Units by mouth Daily., Disp: , Rfl:     No Follow-up on file.

## 2019-10-11 ENCOUNTER — FLU SHOT (OUTPATIENT)
Dept: INTERNAL MEDICINE | Facility: CLINIC | Age: 54
End: 2019-10-11

## 2019-10-11 DIAGNOSIS — Z23 FLU VACCINE NEED: ICD-10-CM

## 2019-10-11 PROCEDURE — 90471 IMMUNIZATION ADMIN: CPT | Performed by: FAMILY MEDICINE

## 2019-10-11 PROCEDURE — 90674 CCIIV4 VAC NO PRSV 0.5 ML IM: CPT | Performed by: FAMILY MEDICINE

## 2019-11-11 ENCOUNTER — OFFICE VISIT (OUTPATIENT)
Dept: INTERNAL MEDICINE | Facility: CLINIC | Age: 54
End: 2019-11-11

## 2019-11-11 VITALS
HEIGHT: 66 IN | HEART RATE: 82 BPM | SYSTOLIC BLOOD PRESSURE: 164 MMHG | BODY MASS INDEX: 20.03 KG/M2 | WEIGHT: 124.6 LBS | DIASTOLIC BLOOD PRESSURE: 86 MMHG | OXYGEN SATURATION: 99 % | TEMPERATURE: 98 F

## 2019-11-11 DIAGNOSIS — I73.9 PERIPHERAL VASCULAR DISEASE WITH CLAUDICATION (HCC): ICD-10-CM

## 2019-11-11 DIAGNOSIS — E78.49 OTHER HYPERLIPIDEMIA: ICD-10-CM

## 2019-11-11 DIAGNOSIS — K21.9 GASTROESOPHAGEAL REFLUX DISEASE, ESOPHAGITIS PRESENCE NOT SPECIFIED: ICD-10-CM

## 2019-11-11 DIAGNOSIS — R21 FACIAL RASH: ICD-10-CM

## 2019-11-11 DIAGNOSIS — F41.9 ANXIETY: ICD-10-CM

## 2019-11-11 DIAGNOSIS — Z12.31 ENCOUNTER FOR SCREENING MAMMOGRAM FOR MALIGNANT NEOPLASM OF BREAST: ICD-10-CM

## 2019-11-11 DIAGNOSIS — I10 ESSENTIAL HYPERTENSION: Primary | ICD-10-CM

## 2019-11-11 DIAGNOSIS — F33.41 RECURRENT MAJOR DEPRESSIVE DISORDER, IN PARTIAL REMISSION (HCC): ICD-10-CM

## 2019-11-11 PROCEDURE — 99214 OFFICE O/P EST MOD 30 MIN: CPT | Performed by: FAMILY MEDICINE

## 2019-11-11 RX ORDER — CITALOPRAM 20 MG/1
20 TABLET ORAL DAILY
Qty: 30 TABLET | Refills: 2 | Status: SHIPPED | OUTPATIENT
Start: 2019-11-11 | End: 2020-02-17 | Stop reason: SDUPTHER

## 2019-11-11 RX ORDER — BUSPIRONE HYDROCHLORIDE 15 MG/1
15 TABLET ORAL 2 TIMES DAILY
Qty: 60 TABLET | Refills: 2 | Status: SHIPPED | OUTPATIENT
Start: 2019-11-11 | End: 2020-02-17 | Stop reason: SDUPTHER

## 2019-11-11 RX ORDER — LISINOPRIL 10 MG/1
10 TABLET ORAL DAILY
Qty: 30 TABLET | Refills: 2 | Status: SHIPPED | OUTPATIENT
Start: 2019-11-11 | End: 2020-02-17 | Stop reason: SDUPTHER

## 2019-11-11 RX ORDER — OMEPRAZOLE 20 MG/1
20 CAPSULE, DELAYED RELEASE ORAL
Qty: 30 CAPSULE | Refills: 2 | Status: SHIPPED | OUTPATIENT
Start: 2019-11-11 | End: 2020-02-17 | Stop reason: SDUPTHER

## 2019-11-11 RX ORDER — ATORVASTATIN CALCIUM 20 MG/1
20 TABLET, FILM COATED ORAL NIGHTLY
Qty: 30 TABLET | Refills: 3 | Status: SHIPPED | OUTPATIENT
Start: 2019-11-11 | End: 2020-02-17 | Stop reason: SDUPTHER

## 2019-11-11 NOTE — PROGRESS NOTES
"Subjective   Sera Guidry is a 53 y.o. female.     Chief Complaint   Patient presents with   • Hypertension     f/u       Visit Vitals  /86 (BP Location: Left arm, Cuff Size: Adult)   Pulse 82   Temp 98 °F (36.7 °C)   Ht 167.6 cm (66\")   Wt 56.5 kg (124 lb 9.6 oz)   SpO2 99%   BMI 20.11 kg/m²       Wt Readings from Last 3 Encounters:   11/11/19 56.5 kg (124 lb 9.6 oz)   11/09/19 54.4 kg (120 lb)   07/12/19 53.7 kg (118 lb 6.4 oz)       Hypertension   This is a new problem. The current episode started more than 1 month ago. The problem is unchanged. The problem is uncontrolled. Associated symptoms include headaches (yesterday) and shortness of breath. Pertinent negatives include no anxiety, blurred vision, chest pain, malaise/fatigue, neck pain, orthopnea, palpitations, peripheral edema, PND or sweats. Agents associated with hypertension include steroids. Risk factors for coronary artery disease include dyslipidemia, family history, post-menopausal state and smoking/tobacco exposure. Past treatments include nothing. Current antihypertension treatment includes ACE inhibitors. There are no compliance problems.  Hypertensive end-organ damage includes PVD. There is no history of angina, kidney disease, CAD/MI, CVA, heart failure, left ventricular hypertrophy or retinopathy. There is no history of sleep apnea or a thyroid problem.   Depression   Visit Type: follow-up  Patient presents with the following symptoms: insomnia and shortness of breath.  Patient is not experiencing: anhedonia, chest pain, choking sensation, compulsions, confusion, decreased concentration, depressed mood, dizziness, dry mouth, excessive worry, fatigue, feelings of hopelessness, feelings of worthlessness, hypersomnia, hyperventilation, irritability, malaise, memory impairment, muscle tension, nausea, nervousness/anxiety, obsessions, palpitations, panic, psychomotor agitation, psychomotor retardation, restlessness, suicidal ideas, " suicidal planning, thoughts of death, weight gain and weight loss.  Frequency of symptoms: rarely   Severity: mild   Sleep quality: fair  Nighttime awakenings: several (pt has been gritting her teeth and bad dreams, because of children's issue)  Compliance with medications:  %        Anxiety   Presents for follow-up visit. Symptoms include insomnia and shortness of breath. Patient reports no chest pain, compulsions, confusion, decreased concentration, depressed mood, dizziness, dry mouth, excessive worry, feeling of choking, hyperventilation, irritability, malaise, muscle tension, nausea, nervous/anxious behavior, obsessions, palpitations, panic, restlessness or suicidal ideas. Symptoms occur rarely. The severity of symptoms is mild. The quality of sleep is fair. Nighttime awakenings: several.     Her past medical history is significant for depression. Compliance with medications is %.      Pt was in urgent care and is under treatment for bronchitis.   Pt has had recurrent elevate blood pressure.  Pt has cut back on her cigarettes, to 1/2 ppd.     Pt has PVD and will have tingling in her legs in the cold weather, when it is too hot or too cold.   The following portions of the patient's history were reviewed and updated as appropriate: allergies, current medications, past family history, past medical history, past social history, past surgical history and problem list.    Past Medical History:   Diagnosis Date   • Allergy    • Anxiety    • Depression    • Endometriosis    • GERD (gastroesophageal reflux disease)    • H/O echocardiogram 04/17/2018    EF 60% mild TR   • Other hyperlipidemia 11/27/2018   • Ovarian cancer (CMS/HCC)     endometrial/cervical   • Ovarian cyst    • Scoliosis       Past Surgical History:   Procedure Laterality Date   • APPENDECTOMY     • COLONOSCOPY  09/11/2018    Dr Patrick   • HYSTERECTOMY     • ORIF WRIST FRACTURE Left 09/2017    with 2 plates   • TOTAL ABDOMINAL HYSTERECTOMY  WITH SALPINGO OOPHORECTOMY      ovaries removed at 2 separate occcasions      Family History   Problem Relation Age of Onset   • Allergies Other    • ADD / ADHD Other    • Heart disease Other         cardiac disorder   • Stroke Other    • Depression Other    • Hypertension Other    • Lung cancer Other    • Uterine cancer Other    • Ovarian cancer Other    • Cancer Mother         left lung   • Stroke Mother         x 2   • Stroke Father         x 3   • Heart disease Father         heart attack   • No Known Problems Son    • No Known Problems Son    • No Known Problems Son       Social History     Socioeconomic History   • Marital status:      Spouse name: Not on file   • Number of children: 3   • Years of education: Not on file   • Highest education level: Not on file   Occupational History   • Occupation: [employer]   Tobacco Use   • Smoking status: Current Every Day Smoker     Packs/day: 0.50     Years: 40.00     Pack years: 20.00     Types: Cigarettes   • Smokeless tobacco: Never Used   Substance and Sexual Activity   • Alcohol use: Yes     Alcohol/week: 1.2 - 1.8 oz     Types: 2 - 3 Cans of beer per week   • Drug use: No     Comment: quit MJ   • Sexual activity: Defer   Social History Narrative    Lives in [facility] with her .      Allergies   Allergen Reactions   • Penicillins Hives       Review of Systems   Constitutional: Negative.  Negative for chills, diaphoresis, fatigue, fever, irritability, malaise/fatigue, weight gain and weight loss.   HENT: Positive for sinus pressure. Negative for ear pain, nosebleeds, postnasal drip, rhinorrhea, sneezing and sore throat.    Eyes: Negative.  Negative for blurred vision, redness and itching.   Respiratory: Positive for cough, shortness of breath and wheezing. Negative for choking.    Cardiovascular: Negative.  Negative for chest pain, palpitations, orthopnea and PND.   Gastrointestinal: Negative.  Negative for abdominal pain, constipation, diarrhea,  nausea and vomiting.   Endocrine: Negative.  Negative for cold intolerance and heat intolerance.   Genitourinary: Negative.  Negative for dysuria, frequency, hematuria and urgency.   Musculoskeletal: Negative.  Negative for arthralgias, back pain and neck pain.   Skin: Positive for rash (on cheeks). Negative for color change.   Allergic/Immunologic: Positive for environmental allergies.   Neurological: Positive for headaches (yesterday). Negative for dizziness, syncope and light-headedness.   Hematological: Negative.  Negative for adenopathy. Does not bruise/bleed easily.   Psychiatric/Behavioral: Negative for confusion, decreased concentration, dysphoric mood and suicidal ideas. The patient has insomnia. The patient is not nervous/anxious.        Objective   Physical Exam   Constitutional: She is oriented to person, place, and time. She appears well-developed.   HENT:   Head: Normocephalic.   Right Ear: External ear normal.   Left Ear: External ear normal.   Nose: Nose normal.   Eyes: Conjunctivae, EOM and lids are normal. Pupils are equal, round, and reactive to light.   Neck: Trachea normal and normal range of motion. Neck supple. Carotid bruit is not present. No thyroid mass and no thyromegaly present.   Cardiovascular: Normal rate and regular rhythm.   No murmur heard.  Pulmonary/Chest: Effort normal and breath sounds normal. No respiratory distress. She has no decreased breath sounds. She has no wheezes. She has no rhonchi. She has no rales. She exhibits no tenderness.   Abdominal: Soft. Bowel sounds are normal. There is no tenderness.   Musculoskeletal: Normal range of motion.   Neurological: She is alert and oriented to person, place, and time.   Skin: Skin is warm and dry. Petechiae noted.        Psychiatric: She has a normal mood and affect. Her behavior is normal.   Nursing note and vitals reviewed.      Assessment/Plan   Sera was seen today for hypertension.    Diagnoses and all orders for this  visit:    Essential hypertension  -     lisinopril (PRINIVIL,ZESTRIL) 10 MG tablet; Take 1 tablet by mouth Daily.  -     Comprehensive Metabolic Panel  -     Lipid Panel  -     TSH  -     CBC & Differential    Anxiety  -     busPIRone (BUSPAR) 15 MG tablet; Take 1 tablet by mouth 2 (Two) Times a Day.    Recurrent major depressive disorder, in partial remission (CMS/HCC)  -     citalopram (CeleXA) 20 MG tablet; Take 1 tablet by mouth Daily.    Gastroesophageal reflux disease, esophagitis presence not specified  -     omeprazole (PrilOSEC) 20 MG capsule; Take 1 capsule by mouth every night at bedtime.    Peripheral vascular disease with claudication (CMS/HCC)  -     atorvastatin (LIPITOR) 20 MG tablet; Take 1 tablet by mouth Every Night.    Other hyperlipidemia  -     atorvastatin (LIPITOR) 20 MG tablet; Take 1 tablet by mouth Every Night.  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    Add lisinopril         Current Outpatient Medications:   •  albuterol sulfate  (90 Base) MCG/ACT inhaler, Inhale 2 puffs Every 4 (Four) Hours As Needed for Wheezing for up to 5 days., Disp: 1 inhaler, Rfl: 0  •  aspirin 81 MG EC tablet, Take 1 tablet by mouth Daily., Disp: 30 tablet, Rfl: 0  •  atorvastatin (LIPITOR) 20 MG tablet, Take 1 tablet by mouth Every Night., Disp: 30 tablet, Rfl: 3  •  azithromycin (ZITHROMAX Z-MELANIA) 250 MG tablet, Take 2 tablets the first day, then 1 tablet daily for 4 days., Disp: 6 tablet, Rfl: 0  •  busPIRone (BUSPAR) 15 MG tablet, Take 1 tablet by mouth 2 (Two) Times a Day., Disp: 60 tablet, Rfl: 2  •  Cholecalciferol (VITAMIN D PO), Take 1,000 Units by mouth Daily., Disp: ,  Rfl:   •  citalopram (CeleXA) 20 MG tablet, Take 1 tablet by mouth Daily., Disp: 30 tablet, Rfl: 2  •  Multiple Vitamins-Minerals (MULTIVITAMIN WOMEN PO), Take 1 tablet by mouth Daily., Disp: , Rfl:   •  nitroglycerin (NITROSTAT) 0.4 MG SL tablet, Place 1 tablet under the tongue Every 5 (Five) Minutes As Needed for Chest Pain. Take no more than 3 doses in 15 minutes., Disp: 100 tablet, Rfl: 0  •  omeprazole (PrilOSEC) 20 MG capsule, Take 1 capsule by mouth every night at bedtime., Disp: 30 capsule, Rfl: 2  •  predniSONE (DELTASONE) 20 MG tablet, Take 2 tablets by mouth Daily for 5 days., Disp: 10 tablet, Rfl: 0  •  vitamin B-12 (CYANOCOBALAMIN) 500 MCG tablet, Take 500 mcg by mouth Daily., Disp: , Rfl:   •  vitamin E 200 UNIT capsule, Take 200 Units by mouth Daily., Disp: , Rfl:   •  lisinopril (PRINIVIL,ZESTRIL) 10 MG tablet, Take 1 tablet by mouth Daily., Disp: 30 tablet, Rfl: 2    Return in about 3 months (around 2/11/2020) for Recheck BP 1 week, Annual.

## 2019-11-13 ENCOUNTER — TELEPHONE (OUTPATIENT)
Dept: INTERNAL MEDICINE | Facility: CLINIC | Age: 54
End: 2019-11-13

## 2019-11-13 NOTE — TELEPHONE ENCOUNTER
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

## 2020-01-31 ENCOUNTER — APPOINTMENT (OUTPATIENT)
Dept: MAMMOGRAPHY | Facility: HOSPITAL | Age: 55
End: 2020-01-31

## 2020-02-17 ENCOUNTER — OFFICE VISIT (OUTPATIENT)
Dept: INTERNAL MEDICINE | Facility: CLINIC | Age: 55
End: 2020-02-17

## 2020-02-17 VITALS
TEMPERATURE: 98.3 F | OXYGEN SATURATION: 97 % | WEIGHT: 117.2 LBS | DIASTOLIC BLOOD PRESSURE: 74 MMHG | BODY MASS INDEX: 20.77 KG/M2 | SYSTOLIC BLOOD PRESSURE: 130 MMHG | HEART RATE: 68 BPM | HEIGHT: 63 IN

## 2020-02-17 DIAGNOSIS — Z12.11 ENCOUNTER FOR SCREENING FECAL OCCULT BLOOD TESTING: ICD-10-CM

## 2020-02-17 DIAGNOSIS — F33.41 RECURRENT MAJOR DEPRESSIVE DISORDER, IN PARTIAL REMISSION (HCC): ICD-10-CM

## 2020-02-17 DIAGNOSIS — M79.10 MUSCLE ACHE: ICD-10-CM

## 2020-02-17 DIAGNOSIS — I10 ESSENTIAL HYPERTENSION: ICD-10-CM

## 2020-02-17 DIAGNOSIS — E78.49 OTHER HYPERLIPIDEMIA: ICD-10-CM

## 2020-02-17 DIAGNOSIS — Z00.00 PHYSICAL EXAM: Primary | ICD-10-CM

## 2020-02-17 DIAGNOSIS — F41.9 ANXIETY: ICD-10-CM

## 2020-02-17 DIAGNOSIS — R07.9 CHEST PAIN, UNSPECIFIED TYPE: ICD-10-CM

## 2020-02-17 DIAGNOSIS — F17.210 CIGARETTE SMOKER: ICD-10-CM

## 2020-02-17 DIAGNOSIS — K21.9 GASTROESOPHAGEAL REFLUX DISEASE, ESOPHAGITIS PRESENCE NOT SPECIFIED: ICD-10-CM

## 2020-02-17 DIAGNOSIS — I73.9 CLAUDICATION OF BOTH LOWER EXTREMITIES (HCC): ICD-10-CM

## 2020-02-17 DIAGNOSIS — I73.9 PERIPHERAL VASCULAR DISEASE WITH CLAUDICATION (HCC): ICD-10-CM

## 2020-02-17 LAB
ALBUMIN SERPL-MCNC: 4.4 G/DL (ref 3.5–5.2)
ALBUMIN/GLOB SERPL: 1.8 G/DL
ALP SERPL-CCNC: 77 U/L (ref 39–117)
ALT SERPL W P-5'-P-CCNC: 16 U/L (ref 1–33)
ANION GAP SERPL CALCULATED.3IONS-SCNC: 13.7 MMOL/L (ref 5–15)
AST SERPL-CCNC: 20 U/L (ref 1–32)
BASOPHILS # BLD AUTO: 0.07 10*3/MM3 (ref 0–0.2)
BASOPHILS NFR BLD AUTO: 0.7 % (ref 0–1.5)
BILIRUB BLD-MCNC: NEGATIVE MG/DL
BILIRUB SERPL-MCNC: 0.3 MG/DL (ref 0.2–1.2)
BUN BLD-MCNC: 9 MG/DL (ref 6–20)
BUN/CREAT SERPL: 12.7 (ref 7–25)
CALCIUM SPEC-SCNC: 9.7 MG/DL (ref 8.6–10.5)
CHLORIDE SERPL-SCNC: 102 MMOL/L (ref 98–107)
CHOLEST SERPL-MCNC: 227 MG/DL (ref 0–200)
CK SERPL-CCNC: 64 U/L (ref 20–180)
CLARITY, POC: CLEAR
CO2 SERPL-SCNC: 26.3 MMOL/L (ref 22–29)
COLOR UR: YELLOW
CREAT BLD-MCNC: 0.71 MG/DL (ref 0.57–1)
DEPRECATED RDW RBC AUTO: 42.7 FL (ref 37–54)
DEVELOPER EXPIRATION DATE: NORMAL
DEVELOPER LOT NUMBER: NORMAL
EOSINOPHIL # BLD AUTO: 0.24 10*3/MM3 (ref 0–0.4)
EOSINOPHIL NFR BLD AUTO: 2.5 % (ref 0.3–6.2)
ERYTHROCYTE [DISTWIDTH] IN BLOOD BY AUTOMATED COUNT: 11.9 % (ref 12.3–15.4)
EXPIRATION DATE: NORMAL
FECAL OCCULT BLOOD SCREEN, POC: NEGATIVE
GFR SERPL CREATININE-BSD FRML MDRD: 85 ML/MIN/1.73
GLOBULIN UR ELPH-MCNC: 2.5 GM/DL
GLUCOSE BLD-MCNC: 86 MG/DL (ref 65–99)
GLUCOSE UR STRIP-MCNC: NEGATIVE MG/DL
HCT VFR BLD AUTO: 47.5 % (ref 34–46.6)
HDLC SERPL-MCNC: 91 MG/DL (ref 40–60)
HGB BLD-MCNC: 16.8 G/DL (ref 12–15.9)
IMM GRANULOCYTES # BLD AUTO: 0.02 10*3/MM3 (ref 0–0.05)
IMM GRANULOCYTES NFR BLD AUTO: 0.2 % (ref 0–0.5)
KETONES UR QL: NEGATIVE
LDLC SERPL CALC-MCNC: 121 MG/DL (ref 0–100)
LDLC/HDLC SERPL: 1.33 {RATIO}
LEUKOCYTE EST, POC: NEGATIVE
LYMPHOCYTES # BLD AUTO: 2.69 10*3/MM3 (ref 0.7–3.1)
LYMPHOCYTES NFR BLD AUTO: 27.7 % (ref 19.6–45.3)
Lab: NORMAL
MCH RBC QN AUTO: 34.5 PG (ref 26.6–33)
MCHC RBC AUTO-ENTMCNC: 35.4 G/DL (ref 31.5–35.7)
MCV RBC AUTO: 97.5 FL (ref 79–97)
MONOCYTES # BLD AUTO: 0.86 10*3/MM3 (ref 0.1–0.9)
MONOCYTES NFR BLD AUTO: 8.9 % (ref 5–12)
NEGATIVE CONTROL: NEGATIVE
NEUTROPHILS # BLD AUTO: 5.82 10*3/MM3 (ref 1.7–7)
NEUTROPHILS NFR BLD AUTO: 60 % (ref 42.7–76)
NITRITE UR-MCNC: NEGATIVE MG/ML
NRBC BLD AUTO-RTO: 0 /100 WBC (ref 0–0.2)
PH UR: 7 [PH] (ref 5–8)
PLATELET # BLD AUTO: 316 10*3/MM3 (ref 140–450)
PMV BLD AUTO: 10 FL (ref 6–12)
POSITIVE CONTROL: POSITIVE
POTASSIUM BLD-SCNC: 4.9 MMOL/L (ref 3.5–5.2)
PROT SERPL-MCNC: 6.9 G/DL (ref 6–8.5)
PROT UR STRIP-MCNC: NEGATIVE MG/DL
RBC # BLD AUTO: 4.87 10*6/MM3 (ref 3.77–5.28)
RBC # UR STRIP: NEGATIVE /UL
SODIUM BLD-SCNC: 142 MMOL/L (ref 136–145)
SP GR UR: 1.01 (ref 1–1.03)
TRIGL SERPL-MCNC: 76 MG/DL (ref 0–150)
TSH SERPL DL<=0.05 MIU/L-ACNC: 1.35 UIU/ML (ref 0.27–4.2)
UROBILINOGEN UR QL: NORMAL
VLDLC SERPL-MCNC: 15.2 MG/DL (ref 5–40)
WBC NRBC COR # BLD: 9.7 10*3/MM3 (ref 3.4–10.8)

## 2020-02-17 PROCEDURE — 82550 ASSAY OF CK (CPK): CPT | Performed by: FAMILY MEDICINE

## 2020-02-17 PROCEDURE — 80050 GENERAL HEALTH PANEL: CPT | Performed by: FAMILY MEDICINE

## 2020-02-17 PROCEDURE — 85652 RBC SED RATE AUTOMATED: CPT | Performed by: FAMILY MEDICINE

## 2020-02-17 PROCEDURE — 36415 COLL VENOUS BLD VENIPUNCTURE: CPT | Performed by: FAMILY MEDICINE

## 2020-02-17 PROCEDURE — 80061 LIPID PANEL: CPT | Performed by: FAMILY MEDICINE

## 2020-02-17 PROCEDURE — 93000 ELECTROCARDIOGRAM COMPLETE: CPT | Performed by: FAMILY MEDICINE

## 2020-02-17 PROCEDURE — 82270 OCCULT BLOOD FECES: CPT | Performed by: FAMILY MEDICINE

## 2020-02-17 PROCEDURE — 81003 URINALYSIS AUTO W/O SCOPE: CPT | Performed by: FAMILY MEDICINE

## 2020-02-17 PROCEDURE — 86038 ANTINUCLEAR ANTIBODIES: CPT | Performed by: FAMILY MEDICINE

## 2020-02-17 PROCEDURE — 99396 PREV VISIT EST AGE 40-64: CPT | Performed by: FAMILY MEDICINE

## 2020-02-17 RX ORDER — LISINOPRIL 10 MG/1
10 TABLET ORAL DAILY
Qty: 30 TABLET | Refills: 2 | Status: SHIPPED | OUTPATIENT
Start: 2020-02-17 | End: 2021-01-18 | Stop reason: SDUPTHER

## 2020-02-17 RX ORDER — BUSPIRONE HYDROCHLORIDE 15 MG/1
15 TABLET ORAL 2 TIMES DAILY
Qty: 60 TABLET | Refills: 2 | Status: SHIPPED | OUTPATIENT
Start: 2020-02-17 | End: 2021-04-13

## 2020-02-17 RX ORDER — CITALOPRAM 20 MG/1
20 TABLET ORAL DAILY
Qty: 30 TABLET | Refills: 2 | Status: SHIPPED | OUTPATIENT
Start: 2020-02-17 | End: 2021-04-13

## 2020-02-17 RX ORDER — OMEPRAZOLE 20 MG/1
20 CAPSULE, DELAYED RELEASE ORAL
Qty: 30 CAPSULE | Refills: 2 | Status: SHIPPED | OUTPATIENT
Start: 2020-02-17 | End: 2021-05-12 | Stop reason: SDUPTHER

## 2020-02-17 RX ORDER — ATORVASTATIN CALCIUM 20 MG/1
20 TABLET, FILM COATED ORAL NIGHTLY
Qty: 30 TABLET | Refills: 3 | Status: SHIPPED | OUTPATIENT
Start: 2020-02-17 | End: 2020-02-18 | Stop reason: DRUGHIGH

## 2020-02-17 NOTE — PROGRESS NOTES
"Subjective   Sera Guidry is a 54 y.o. female.     Chief Complaint   Patient presents with   • Annual Exam       Visit Vitals  /74 (BP Location: Left arm, Cuff Size: Adult)   Pulse 68   Temp 98.3 °F (36.8 °C)   Ht 159.5 cm (62.8\")   Wt 53.2 kg (117 lb 3.2 oz)   SpO2 97%   BMI 20.89 kg/m²         Nicotine Dependence   Presents for initial visit. Symptoms are negative for decreased concentration, fatigue and sore throat. Preferred tobacco types include cigarettes. Preferred cigarette types include filtered. Preferred strength is regular. Preferred cigarettes are non-menthol. Cigarette brand: talen sweet. Her urge triggers include company of smokers, contact with substance, drinking alcohol, drinking coffee, driving and stress. Risk factors do not include meal time.Her first smoke is before 6 AM. She smokes < 1/2 a pack (pt used to smoke 2 ppd for 30 yrs) of cigarettes per day. She started smoking when she was 13-14 years old. Past treatments include nicotine patch. The treatment provided moderate relief. Compliance with prior treatments has been variable. Sera is thinking about quitting. Sera has tried to quit 5 or more times. There is no history of alcohol abuse and drug use.      Pt here for annual exam.  Pt has claudication of both lower legs. Pt can stand on legs 3-4 hours then pain. Pt can walk about 1/2 block and it feels like legs will fall off.  Pt's right foot will go to sleep in gas pedal.     Last week pt had burning sensation right arm and chest that was relieved by NTG.   Pt states that she is smoking less than 1/2 ppd. Cigarettes cause burning in chest and throat. Pt has smoked 2 ppd for 30 yrs before cutting back.     Pt has change in moles on back and a chronic itch on shoulder mole.     Pt needs refill of meds for anxiety, HTN, Hyperlipidemia, depression and GERD.     The following portions of the patient's history were reviewed and updated as appropriate: allergies, current " medications, past family history, past medical history, past social history, past surgical history and problem list.    Past Medical History:   Diagnosis Date   • Allergy    • Anxiety    • Depression    • Endometriosis    • GERD (gastroesophageal reflux disease)    • H/O echocardiogram 04/17/2018    EF 60% mild TR   • Other hyperlipidemia 11/27/2018   • Ovarian cancer (CMS/HCC)     endometrial/cervical   • Ovarian cyst    • Scoliosis       Past Surgical History:   Procedure Laterality Date   • APPENDECTOMY     • COLONOSCOPY  09/11/2018    Dr Patrick   • HYSTERECTOMY     • ORIF WRIST FRACTURE Left 09/2017    with 2 plates   • TOTAL ABDOMINAL HYSTERECTOMY WITH SALPINGO OOPHORECTOMY      ovaries removed at 2 separate occcasions      Family History   Problem Relation Age of Onset   • Allergies Other    • ADD / ADHD Other    • Heart disease Other         cardiac disorder   • Stroke Other    • Depression Other    • Hypertension Other    • Lung cancer Other    • Uterine cancer Other    • Ovarian cancer Other    • Cancer Mother         left lung   • Stroke Mother         x 2   • Stroke Father         x 3   • Heart disease Father         heart attack   • No Known Problems Son    • No Known Problems Son    • No Known Problems Son       Social History     Socioeconomic History   • Marital status:      Spouse name: Not on file   • Number of children: 3   • Years of education: Not on file   • Highest education level: Not on file   Occupational History   • Occupation: [employer]   Tobacco Use   • Smoking status: Current Every Day Smoker     Packs/day: 0.50     Years: 40.00     Pack years: 20.00     Types: Cigarettes   • Smokeless tobacco: Never Used   Substance and Sexual Activity   • Alcohol use: Yes     Alcohol/week: 2.0 - 3.0 standard drinks     Types: 2 - 3 Cans of beer per week   • Drug use: No     Comment: quit MJ   • Sexual activity: Defer   Social History Narrative    Lives in [facility] with her .       Allergies   Allergen Reactions   • Penicillins Hives       Review of Systems   Constitutional: Positive for chills and diaphoresis. Negative for activity change, appetite change, fatigue, fever and unexpected weight change.   HENT: Negative.  Negative for congestion, dental problem, drooling, ear discharge, ear pain, facial swelling, hearing loss, mouth sores, nosebleeds, postnasal drip, rhinorrhea, sinus pressure, sinus pain, sneezing, sore throat, tinnitus, trouble swallowing and voice change.    Eyes: Negative.  Negative for photophobia, pain, discharge, redness, itching and visual disturbance.   Respiratory: Negative.  Negative for apnea, cough, choking, chest tightness, shortness of breath, wheezing and stridor.    Cardiovascular: Positive for chest pain and palpitations (racing occasionally). Negative for leg swelling.   Gastrointestinal: Negative.  Negative for abdominal distention, abdominal pain, anal bleeding, blood in stool, constipation, diarrhea, nausea, rectal pain and vomiting.   Endocrine: Positive for cold intolerance and heat intolerance. Negative for polydipsia, polyphagia and polyuria.   Genitourinary: Negative.  Negative for decreased urine volume, difficulty urinating, dyspareunia, dysuria, enuresis, flank pain, frequency, genital sores, hematuria, menstrual problem, pelvic pain, urgency, vaginal bleeding, vaginal discharge and vaginal pain.   Musculoskeletal: Positive for back pain (scoliosis), gait problem, myalgias, neck pain and neck stiffness. Negative for arthralgias and joint swelling.   Skin: Negative.  Negative for color change, pallor, rash and wound.        Dry skin   Allergic/Immunologic: Negative.  Negative for environmental allergies, food allergies and immunocompromised state.   Neurological: Negative for dizziness, tremors, seizures, syncope, facial asymmetry, speech difficulty, weakness, light-headedness, numbness and headaches.   Hematological: Negative.  Negative for  adenopathy. Does not bruise/bleed easily.   Psychiatric/Behavioral: Positive for dysphoric mood and sleep disturbance ( reports teeth grinding and gasping for air). Negative for agitation, behavioral problems, confusion, decreased concentration, hallucinations, self-injury and suicidal ideas. The patient is nervous/anxious. The patient is not hyperactive.         Buspar and celexa are controlling anxiety and depression       Objective   Physical Exam   Constitutional: She is oriented to person, place, and time. She appears well-developed and well-nourished. No distress.   HENT:   Head: Normocephalic and atraumatic.   Right Ear: Tympanic membrane, external ear and ear canal normal.   Left Ear: Tympanic membrane, external ear and ear canal normal.   Nose: Nose normal.   Mouth/Throat: Uvula is midline, oropharynx is clear and moist and mucous membranes are normal. Mucous membranes are not pale, not dry and not cyanotic. No oropharyngeal exudate, posterior oropharyngeal edema or posterior oropharyngeal erythema.   Eyes: Pupils are equal, round, and reactive to light. Conjunctivae, EOM and lids are normal. Right eye exhibits no chemosis, no discharge, no exudate and no hordeolum. No foreign body present in the right eye. Left eye exhibits no chemosis, no discharge, no exudate and no hordeolum. No foreign body present in the left eye. Right conjunctiva is not injected. Right conjunctiva has no hemorrhage. Left conjunctiva is not injected. Left conjunctiva has no hemorrhage. No scleral icterus. Right eye exhibits normal extraocular motion and no nystagmus. Left eye exhibits normal extraocular motion and no nystagmus.   Fundoscopic exam:       The right eye shows red reflex.        The left eye shows red reflex.   Neck: Trachea normal and normal range of motion. Neck supple. Carotid bruit is not present. No tracheal deviation present. No thyroid mass and no thyromegaly present.   Cardiovascular: Normal rate, regular  rhythm, normal heart sounds and intact distal pulses. Exam reveals no gallop and no friction rub.   No murmur heard.  Pulses:       Dorsalis pedis pulses are 0 on the right side, and 0 on the left side.        Posterior tibial pulses are 0 on the right side, and 0 on the left side.   Pulmonary/Chest: Effort normal. No respiratory distress. She has no decreased breath sounds. She has no wheezes. She has rhonchi. She has no rales. Chest wall is not dull to percussion. She exhibits no tenderness. Right breast exhibits no inverted nipple, no mass, no nipple discharge, no skin change and no tenderness. Left breast exhibits no inverted nipple, no mass, no nipple discharge, no skin change and no tenderness.   Abdominal: Soft. Bowel sounds are normal. She exhibits no distension and no mass. There is no hepatosplenomegaly. There is no tenderness. There is no rebound and no guarding.   Genitourinary: Rectum normal. Rectal exam shows no external hemorrhoid, no internal hemorrhoid, no fissure, no mass, no tenderness, anal tone normal and guaiac negative stool.   Musculoskeletal: Normal range of motion. She exhibits no edema, tenderness or deformity.   Lymphadenopathy:        Head (right side): No submandibular adenopathy present.        Head (left side): No submandibular adenopathy present.     She has no cervical adenopathy.        Right cervical: No superficial cervical, no deep cervical and no posterior cervical adenopathy present.       Left cervical: No superficial cervical, no deep cervical and no posterior cervical adenopathy present.     She has no axillary adenopathy.        Right axillary: No pectoral and no lateral adenopathy present.        Left axillary: No pectoral and no lateral adenopathy present.       Right: No inguinal and no supraclavicular adenopathy present.        Left: No inguinal and no supraclavicular adenopathy present.   Neurological: She is alert and oriented to person, place, and time. She has  normal strength and normal reflexes. She displays normal reflexes. No cranial nerve deficit or sensory deficit. Coordination normal.   Reflex Scores:       Bicep reflexes are 2+ on the right side and 2+ on the left side.       Brachioradialis reflexes are 2+ on the right side and 2+ on the left side.       Patellar reflexes are 2+ on the right side and 2+ on the left side.  Skin: Skin is warm and dry. No rash noted. She is not diaphoretic. No cyanosis. Nails show no clubbing.   Psychiatric: She has a normal mood and affect. Her speech is normal and behavior is normal. Judgment and thought content normal. Cognition and memory are normal.   Nursing note and vitals reviewed.        Assessment/Plan   Sera was seen today for annual exam.    Diagnoses and all orders for this visit:    Physical exam  -     POCT urinalysis dipstick, automated    Essential hypertension  -     lisinopril (PRINIVIL,ZESTRIL) 10 MG tablet; Take 1 tablet by mouth Daily.  -     Comprehensive Metabolic Panel  -     Lipid Panel  -     TSH  -     CBC w AUTO Differential  -     CBC Auto Differential    Anxiety  -     busPIRone (BUSPAR) 15 MG tablet; Take 1 tablet by mouth 2 (Two) Times a Day.    Recurrent major depressive disorder, in partial remission (CMS/HCC)  -     citalopram (CeleXA) 20 MG tablet; Take 1 tablet by mouth Daily.    Gastroesophageal reflux disease, esophagitis presence not specified  -     omeprazole (PrilOSEC) 20 MG capsule; Take 1 capsule by mouth every night at bedtime.    Peripheral vascular disease with claudication (CMS/HCC)  -     atorvastatin (LIPITOR) 20 MG tablet; Take 1 tablet by mouth Every Night.    Other hyperlipidemia  -     atorvastatin (LIPITOR) 20 MG tablet; Take 1 tablet by mouth Every Night.  -     Comprehensive Metabolic Panel  -     Lipid Panel    Claudication of both lower extremities (CMS/HCC)  -     Duplex Lower Extremity Art / Grafts - Bilateral CAR; Future    Muscle ache  -     CK    Cigarette smoker  -      CT Chest Low Dose Wo; Future    Encounter for screening fecal occult blood testing  -     POC Occult Blood Stool    Chest pain, unspecified type  -     ECG 12 Lead  -     Ambulatory Referral to [facility]      Discussed Shingrix vaccine and tdap with pt,  that are currently available at the pharmacy.                Current Outpatient Medications:   •  aspirin 81 MG EC tablet, Take 1 tablet by mouth Daily., Disp: 30 tablet, Rfl: 0  •  atorvastatin (LIPITOR) 20 MG tablet, Take 1 tablet by mouth Every Night., Disp: 30 tablet, Rfl: 3  •  busPIRone (BUSPAR) 15 MG tablet, Take 1 tablet by mouth 2 (Two) Times a Day., Disp: 60 tablet, Rfl: 2  •  Cholecalciferol (VITAMIN D PO), Take 1,000 Units by mouth Daily., Disp: , Rfl:   •  citalopram (CeleXA) 20 MG tablet, Take 1 tablet by mouth Daily., Disp: 30 tablet, Rfl: 2  •  lisinopril (PRINIVIL,ZESTRIL) 10 MG tablet, Take 1 tablet by mouth Daily., Disp: 30 tablet, Rfl: 2  •  Multiple Vitamins-Minerals (MULTIVITAMIN WOMEN PO), Take 1 tablet by mouth Daily., Disp: , Rfl:   •  nitroglycerin (NITROSTAT) 0.4 MG SL tablet, Place 1 tablet under the tongue Every 5 (Five) Minutes As Needed for Chest Pain. Take no more than 3 doses in 15 minutes., Disp: 100 tablet, Rfl: 0  •  omeprazole (PrilOSEC) 20 MG capsule, Take 1 capsule by mouth every night at bedtime., Disp: 30 capsule, Rfl: 2  •  vitamin B-12 (CYANOCOBALAMIN) 500 MCG tablet, Take 500 mcg by mouth Daily., Disp: , Rfl:   •  vitamin E 200 UNIT capsule, Take 200 Units by mouth Daily., Disp: , Rfl:     Return in about 3 months (around 5/17/2020), or if symptoms worsen or fail to improve, for Recheck.    Recent Results (from the past 168 hour(s))   POCT urinalysis dipstick, automated    Collection Time: 02/17/20  1:38 PM   Result Value Ref Range    Color Yellow Yellow, Straw, Dark Yellow, Giulia    Clarity, UA Clear Clear    Specific Gravity  1.015 1.005 - 1.030    pH, Urine 7.0 5.0 - 8.0    Leukocytes  Negative Negative    Nitrite, UA Negative Negative    Protein, POC Negative Negative mg/dL    Glucose, UA Negative Negative, 1000 mg/dL (3+) mg/dL    Ketones, UA Negative Negative    Urobilinogen, UA Normal Normal    Bilirubin Negative Negative    Blood, UA Negative Negative   POC Occult Blood Stool    Collection Time: 02/17/20  5:32 PM   Result Value Ref Range    Fecal Occult Blood Negative Negative    Lot Number [id number]     Expiration Date 3/31/2022     DEVELOPER LOT NUMBER [id number]     DEVELOPER EXPIRATION DATE 1/31/2021     Positive Control Positive Positive    Negative Control Negative Negative       ECG 12 Lead  Date/Time: 2/17/2020 2:01 PM  Performed by: Pooja Perdomo MD  Authorized by: Pooja Perdomo MD   Comparison: compared with previous ECG from 7/3/2018  Similar to previous ECG  Rhythm: sinus rhythm and sinus bradycardia  Rate: bradycardic  BPM: 52  Conduction comments: Right ventricular conduction delay.   QRS axis: normal (P, R, T: 65, 24, 82)  Other findings: non-specific ST-T wave changes, left ventricular hypertrophy and left atrial abnormality    Clinical impression: abnormal EKG  Comments: CO int 145 ms  QRS dur 90 ms  QT/QTc 480/461 ms

## 2020-02-18 ENCOUNTER — TELEPHONE (OUTPATIENT)
Dept: INTERNAL MEDICINE | Facility: CLINIC | Age: 55
End: 2020-02-18

## 2020-02-18 DIAGNOSIS — R21 FACIAL RASH: Primary | ICD-10-CM

## 2020-02-18 DIAGNOSIS — R76.8 ANA POSITIVE: ICD-10-CM

## 2020-02-18 DIAGNOSIS — I73.9 PERIPHERAL VASCULAR DISEASE WITH CLAUDICATION (HCC): ICD-10-CM

## 2020-02-18 DIAGNOSIS — R71.8 ELEVATED RED BLOOD CELL COUNT: Primary | ICD-10-CM

## 2020-02-18 DIAGNOSIS — E78.49 OTHER HYPERLIPIDEMIA: Primary | ICD-10-CM

## 2020-02-18 LAB
ANA HOMOGEN TITR SER: ABNORMAL {TITER}
ANA SER QL IA: POSITIVE
ERYTHROCYTE [SEDIMENTATION RATE] IN BLOOD: 2 MM/HR (ref 0–30)
Lab: ABNORMAL

## 2020-02-18 RX ORDER — ATORVASTATIN CALCIUM 40 MG/1
40 TABLET, FILM COATED ORAL DAILY
Qty: 30 TABLET | Refills: 3 | Status: SHIPPED | OUTPATIENT
Start: 2020-02-18 | End: 2020-03-26 | Stop reason: SDUPTHER

## 2020-02-18 NOTE — TELEPHONE ENCOUNTER
Pt notified of results, pt verbalized a very good understanding. She states she is taking her medication and follows a healthier diet. Would be ok to double medication.

## 2020-02-19 ENCOUNTER — TELEPHONE (OUTPATIENT)
Dept: INTERNAL MEDICINE | Facility: CLINIC | Age: 55
End: 2020-02-19

## 2020-02-19 NOTE — TELEPHONE ENCOUNTER
----- Message from Pooja CHANDLER MD sent at 2/18/2020  8:58 PM EST -----  Referral Rheumatology for positive ANASTASIYA, which can be seen in people with no problems.

## 2020-02-24 ENCOUNTER — OFFICE VISIT (OUTPATIENT)
Dept: CARDIOLOGY | Facility: HOSPITAL | Age: 55
End: 2020-02-24

## 2020-02-24 VITALS
HEART RATE: 58 BPM | RESPIRATION RATE: 18 BRPM | BODY MASS INDEX: 20.73 KG/M2 | DIASTOLIC BLOOD PRESSURE: 82 MMHG | OXYGEN SATURATION: 99 % | HEIGHT: 63 IN | SYSTOLIC BLOOD PRESSURE: 150 MMHG | WEIGHT: 117 LBS | TEMPERATURE: 96.9 F

## 2020-02-24 DIAGNOSIS — R20.2 BILATERAL NUMBNESS AND TINGLING OF ARMS AND LEGS: ICD-10-CM

## 2020-02-24 DIAGNOSIS — R53.83 OTHER FATIGUE: ICD-10-CM

## 2020-02-24 DIAGNOSIS — R20.0 BILATERAL NUMBNESS AND TINGLING OF ARMS AND LEGS: ICD-10-CM

## 2020-02-24 DIAGNOSIS — I73.9 PERIPHERAL VASCULAR DISEASE WITH CLAUDICATION (HCC): ICD-10-CM

## 2020-02-24 DIAGNOSIS — Z82.49 FAMILY HISTORY OF PREMATURE CAD: ICD-10-CM

## 2020-02-24 DIAGNOSIS — Z72.0 TOBACCO USE: ICD-10-CM

## 2020-02-24 DIAGNOSIS — E78.49 OTHER HYPERLIPIDEMIA: ICD-10-CM

## 2020-02-24 DIAGNOSIS — R94.31 ABNORMAL EKG: ICD-10-CM

## 2020-02-24 DIAGNOSIS — I10 ESSENTIAL HYPERTENSION: ICD-10-CM

## 2020-02-24 DIAGNOSIS — R07.2 PRECORDIAL PAIN: Primary | ICD-10-CM

## 2020-02-24 NOTE — PROGRESS NOTES
[facility]  [facility]      Encounter Date:02/24/2020     Sera Guidry  [address] [facility] [address]  [unfilled]    1965    Pooja Perdomo MD    Sera Guidry is a 54 y.o. female.      Subjective:     Chief Complaint:  Chest Pain       HPI 54 year old female presents to the office today at the request of her PCP for ongoing evaluation of her chest pain, PONCE and fatigue. She notes that chest pain is intermittent and is located in center to left chest. Pain radiates through to her back between her shoulder blades. She notes that pain worsens with exertion. She has associated N/T in Bilateral arms that worsens with exertion as well. She notes that she tires very easily. Pain has improved with NTG.  She has associated dyspnea and fatigue.  She also notes intermittent palpitations a few times a week.  She reports skipped heartbeats that occur when lying down.    Cardiac risk factors:   dyslipidemia, hypertension, hx of PVD   Tobacco use, sedentary lifestyle   Family history of premature coronary artery disease (male < 55 yrs, female <66 yrs)  Patient Active Problem List   Diagnosis   • Sciatica   • Scoliosis   • Anxiety   • Tobacco use   • Fatigue   • Hot flashes   • Mood swings   • Leg cramps, sleep related   • Encounter for screening colonoscopy   • Osteoporosis screening   • Breast cancer screening   • Skin lesion   • Hormone imbalance   • Screening for cardiovascular condition   • Recurrent major depressive disorder, in partial remission (CMS/HCC)   • Chest pain   • Peripheral vascular disease with claudication (CMS/HCC)   • Other hyperlipidemia       Past Medical History:   Diagnosis Date   • Allergy    • Anxiety    • Depression    • Endometriosis    • GERD (gastroesophageal reflux disease)    • H/O echocardiogram 04/17/2018    EF 60% mild TR   • Other hyperlipidemia 11/27/2018   • Ovarian cancer (CMS/HCC)     endometrial/cervical   • Ovarian cyst     • Scoliosis        Past Surgical History:   Procedure Laterality Date   • APPENDECTOMY     • COLONOSCOPY  09/11/2018    Dr Patrick   • HYSTERECTOMY     • ORIF WRIST FRACTURE Left 09/2017    with 2 plates   • TOTAL ABDOMINAL HYSTERECTOMY WITH SALPINGO OOPHORECTOMY      ovaries removed at 2 separate occcasions       Family History   Problem Relation Age of Onset   • Allergies Other    • ADD / ADHD Other    • Heart disease Other         cardiac disorder   • Stroke Other    • Depression Other    • Hypertension Other    • Lung cancer Other    • Uterine cancer Other    • Ovarian cancer Other    • Cancer Mother         left lung   • Stroke Mother         x 2   • Stroke Father         x 3   • Heart disease Father 55        heart attack   • No Known Problems Son    • No Known Problems Son    • No Known Problems Son    • No Known Problems Brother        Social History     Socioeconomic History   • Marital status:      Spouse name: Not on file   • Number of children: 3   • Years of education: Not on file   • Highest education level: Not on file   Occupational History   • Occupation: [employer]   Tobacco Use   • Smoking status: Current Every Day Smoker     Packs/day: 0.50     Years: 40.00     Pack years: 20.00     Types: Cigarettes   • Smokeless tobacco: Never Used   Substance and Sexual Activity   • Alcohol use: Yes     Alcohol/week: 2.0 - 3.0 standard drinks     Types: 2 - 3 Cans of beer per week   • Drug use: No     Comment: quit MJ   • Sexual activity: Defer   Social History Narrative    Lives in [facility] with her .        caffeine use: 2 servings of coffee daily, occassionally soda sprite or 7up       Allergies   Allergen Reactions   • Penicillins Hives         Current Outpatient Medications:   •  aspirin 81 MG EC tablet, Take 1 tablet by mouth Daily., Disp: 30 tablet, Rfl: 0  •  atorvastatin (LIPITOR) 40 MG tablet, Take 1 tablet by mouth Daily., Disp: 30 tablet, Rfl: 3  •  busPIRone (BUSPAR) 15 MG  tablet, Take 1 tablet by mouth 2 (Two) Times a Day., Disp: 60 tablet, Rfl: 2  •  Cholecalciferol (VITAMIN D PO), Take 1,000 Units by mouth Daily., Disp: , Rfl:   •  citalopram (CeleXA) 20 MG tablet, Take 1 tablet by mouth Daily., Disp: 30 tablet, Rfl: 2  •  lisinopril (PRINIVIL,ZESTRIL) 10 MG tablet, Take 1 tablet by mouth Daily., Disp: 30 tablet, Rfl: 2  •  Multiple Vitamins-Minerals (MULTIVITAMIN WOMEN PO), Take 1 tablet by mouth Daily., Disp: , Rfl:   •  nitroglycerin (NITROSTAT) 0.4 MG SL tablet, Place 1 tablet under the tongue Every 5 (Five) Minutes As Needed for Chest Pain. Take no more than 3 doses in 15 minutes., Disp: 100 tablet, Rfl: 0  •  omeprazole (PrilOSEC) 20 MG capsule, Take 1 capsule by mouth every night at bedtime., Disp: 30 capsule, Rfl: 2  •  vitamin B-12 (CYANOCOBALAMIN) 500 MCG tablet, Take 500 mcg by mouth Daily., Disp: , Rfl:   •  vitamin E 200 UNIT capsule, Take 200 Units by mouth Daily., Disp: , Rfl:     The following portions of the patient's history were reviewed today and updated as appropriate: allergies, current medications, past family history, past medical history, past social history, past surgical history and problem list     Review of Systems   Constitution: Positive for malaise/fatigue. Negative for chills, decreased appetite, diaphoresis, fever, night sweats, weight gain and weight loss.   HENT: Positive for congestion. Negative for hearing loss, hoarse voice and nosebleeds.    Eyes: Negative for blurred vision, visual disturbance and visual halos.   Cardiovascular: Positive for chest pain, dyspnea on exertion, irregular heartbeat, orthopnea and palpitations. Negative for claudication, cyanosis, leg swelling, near-syncope, paroxysmal nocturnal dyspnea and syncope.   Respiratory: Positive for cough, shortness of breath, snoring, sputum production and wheezing. Negative for hemoptysis and sleep disturbances due to breathing.    Endocrine: Positive for cold intolerance and heat  "intolerance.   Hematologic/Lymphatic: Negative for bleeding problem. Bruises/bleeds easily.   Skin: Negative for dry skin, itching and rash.   Musculoskeletal: Positive for joint pain and muscle cramps. Negative for arthritis, falls, joint swelling and myalgias.   Gastrointestinal: Positive for heartburn. Negative for bloating, abdominal pain, constipation, diarrhea, flatus, hematemesis, hematochezia, melena, nausea and vomiting.   Genitourinary: Negative for dysuria, frequency, hematuria, nocturia and urgency.   Neurological: Positive for excessive daytime sleepiness, dizziness, headaches and loss of balance. Negative for light-headedness and weakness.   Psychiatric/Behavioral: Positive for altered mental status, depression and memory loss. The patient has insomnia and is nervous/anxious.        Objective:     Vitals:    02/24/20 1018 02/24/20 1049   BP: (!) 189/80 150/82   BP Location: Left arm Left arm   Patient Position: Sitting Sitting   Cuff Size: Adult    Pulse: 58    Resp: 18    Temp: 96.9 °F (36.1 °C)    TempSrc: Temporal    SpO2: 99%    Weight: 53.1 kg (117 lb)    Height: 159.5 cm (62.8\")    Clonidine 0.1 mg given in office with appropriate systolic reduction in blood pressure (NDC 6630-6598-01)  Body mass index is 20.86 kg/m².  Physical Exam   Constitutional: She is oriented to person, place, and time. She appears well-developed and well-nourished. She is active and cooperative. No distress.   HENT:   Head: Normocephalic and atraumatic.   Mouth/Throat: Oropharynx is clear and moist.   Eyes: Pupils are equal, round, and reactive to light. Conjunctivae and EOM are normal.   Neck: Normal range of motion. Neck supple. No JVD present. No tracheal deviation present. No thyromegaly present.   Cardiovascular: Normal rate, regular rhythm, normal heart sounds and intact distal pulses.   Pulmonary/Chest: Effort normal and breath sounds normal.   Abdominal: Soft. Bowel sounds are normal. She exhibits no distension. " There is no tenderness.   Musculoskeletal: Normal range of motion.   Neurological: She is alert and oriented to person, place, and time.   Skin: Skin is warm, dry and intact.   Psychiatric: She has a normal mood and affect. Her behavior is normal.   Nursing note and vitals reviewed.      Lab and Diagnostic Review:  Results for orders placed or performed in visit on 02/17/20   Comprehensive Metabolic Panel   Result Value Ref Range    Glucose 86 65 - 99 mg/dL    BUN 9 6 - 20 mg/dL    Creatinine 0.71 0.57 - 1.00 mg/dL    Sodium 142 136 - 145 mmol/L    Potassium 4.9 3.5 - 5.2 mmol/L    Chloride 102 98 - 107 mmol/L    CO2 26.3 22.0 - 29.0 mmol/L    Calcium 9.7 8.6 - 10.5 mg/dL    Total Protein 6.9 6.0 - 8.5 g/dL    Albumin 4.40 3.50 - 5.20 g/dL    ALT (SGPT) 16 1 - 33 U/L    AST (SGOT) 20 1 - 32 U/L    Alkaline Phosphatase 77 39 - 117 U/L    Total Bilirubin 0.3 0.2 - 1.2 mg/dL    eGFR Non African Amer 85 >60 mL/min/1.73    Globulin 2.5 gm/dL    A/G Ratio 1.8 g/dL    BUN/Creatinine Ratio 12.7 7.0 - 25.0    Anion Gap 13.7 5.0 - 15.0 mmol/L   Lipid Panel   Result Value Ref Range    Total Cholesterol 227 (H) 0 - 200 mg/dL    Triglycerides 76 0 - 150 mg/dL    HDL Cholesterol 91 (H) 40 - 60 mg/dL    LDL Cholesterol  121 (H) 0 - 100 mg/dL    VLDL Cholesterol 15.2 5 - 40 mg/dL    LDL/HDL Ratio 1.33    TSH   Result Value Ref Range    TSH 1.350 0.270 - 4.200 uIU/mL   CBC Auto Differential   Result Value Ref Range    WBC 9.70 3.40 - 10.80 10*3/mm3    RBC 4.87 3.77 - 5.28 10*6/mm3    Hemoglobin 16.8 (H) 12.0 - 15.9 g/dL    Hematocrit 47.5 (H) 34.0 - 46.6 %    MCV 97.5 (H) 79.0 - 97.0 fL    MCH 34.5 (H) 26.6 - 33.0 pg    MCHC 35.4 31.5 - 35.7 g/dL    RDW 11.9 (L) 12.3 - 15.4 %    RDW-SD 42.7 37.0 - 54.0 fl    MPV 10.0 6.0 - 12.0 fL    Platelets 316 140 - 450 10*3/mm3    Neutrophil % 60.0 42.7 - 76.0 %    Lymphocyte % 27.7 19.6 - 45.3 %    Monocyte % 8.9 5.0 - 12.0 %    Eosinophil % 2.5 0.3 - 6.2 %    Basophil % 0.7 0.0 - 1.5 %     Immature Grans % 0.2 0.0 - 0.5 %    Neutrophils, Absolute 5.82 1.70 - 7.00 10*3/mm3    Lymphocytes, Absolute 2.69 0.70 - 3.10 10*3/mm3    Monocytes, Absolute 0.86 0.10 - 0.90 10*3/mm3    Eosinophils, Absolute 0.24 0.00 - 0.40 10*3/mm3    Basophils, Absolute 0.07 0.00 - 0.20 10*3/mm3    Immature Grans, Absolute 0.02 0.00 - 0.05 10*3/mm3    nRBC 0.0 0.0 - 0.2 /100 WBC   CK   Result Value Ref Range    Creatine Kinase 64 20 - 180 U/L   POCT urinalysis dipstick, automated   Result Value Ref Range    Color Yellow Yellow, Straw, Dark Yellow, Giulia    Clarity, UA Clear Clear    Specific Gravity  1.015 1.005 - 1.030    pH, Urine 7.0 5.0 - 8.0    Leukocytes Negative Negative    Nitrite, UA Negative Negative    Protein, POC Negative Negative mg/dL    Glucose, UA Negative Negative, 1000 mg/dL (3+) mg/dL    Ketones, UA Negative Negative    Urobilinogen, UA Normal Normal    Bilirubin Negative Negative    Blood, UA Negative Negative   POC Occult Blood Stool   Result Value Ref Range    Fecal Occult Blood Negative Negative    Lot Number [id number]     Expiration Date 3/31/2022     DEVELOPER LOT NUMBER [id number]     DEVELOPER EXPIRATION DATE 1/31/2021     Positive Control Positive Positive    Negative Control Negative Negative     EKG: Sinus bradycardia at 52 bpm, nonspecific ST and T wave abnormality    Assessment and Plan:   1. Precordial pain  kristen score: 2  - Stress Test With Myocardial Perfusion (1 Day); Future    2. Abnormal EKG    - Stress Test With Myocardial Perfusion (1 Day); Future    3. Peripheral vascular disease with claudication (CMS/HCC)  Followed by Dr Clarke  - Stress Test With Myocardial Perfusion (1 Day); Future    4. Other hyperlipidemia  Statin   - Stress Test With Myocardial Perfusion (1 Day); Future    5. Tobacco use  It is very important that she quit smoking. There are various alternatives available to help with this difficult task, but first and foremost, she must make a firm commitment and decision to quit.  The nature of nicotine addiction is discussed. The correct use, cost and side effects of nicotine replacement therapy such as gum or patches was discussed. Bupropion and its cost (sometimes not covered fully by insurance) and side effects are reviewed.  I recommend she not allow potential costs of treatment to deter her from using nicotine replacement therapy or bupropion, as the long term economic and health benefits are obvious. Education time 2 minutes  - Stress Test With Myocardial Perfusion (1 Day); Future    6. Other fatigue    - Stress Test With Myocardial Perfusion (1 Day); Future    7. Family history of premature CAD    - Stress Test With Myocardial Perfusion (1 Day); Future    8. Essential hypertension  Clonidine given in office with appropriate reduction in systolic blood pressure  Continue lisinopril  - Stress Test With Myocardial Perfusion (1 Day); Future    9. Bilateral numbness and tingling of arms and legs    - Stress Test With Myocardial Perfusion (1 Day); Future          It has been a pleasure to participate in the care of this patient.  Patient was instructed to call the [facility] with any questions, concerns, or worsening symptoms.    *Please note that portions of this note were completed with a voice recognition program. Efforts were made to edit the dictations, but occasionally words are mistranscribed.

## 2020-02-25 ENCOUNTER — APPOINTMENT (OUTPATIENT)
Dept: CARDIOLOGY | Facility: HOSPITAL | Age: 55
End: 2020-02-25

## 2020-02-26 ENCOUNTER — HOSPITAL ENCOUNTER (OUTPATIENT)
Dept: CARDIOLOGY | Facility: HOSPITAL | Age: 55
Discharge: HOME | End: 2020-02-26
Admitting: NURSE PRACTITIONER

## 2020-02-26 ENCOUNTER — HOSPITAL ENCOUNTER (OUTPATIENT)
Dept: CARDIOLOGY | Facility: HOSPITAL | Age: 55
Discharge: HOME | End: 2020-02-26

## 2020-02-26 ENCOUNTER — APPOINTMENT (OUTPATIENT)
Dept: CARDIOLOGY | Facility: HOSPITAL | Age: 55
End: 2020-02-26

## 2020-02-26 ENCOUNTER — TELEPHONE (OUTPATIENT)
Dept: CARDIOLOGY | Facility: HOSPITAL | Age: 55
End: 2020-02-26

## 2020-02-26 VITALS
WEIGHT: 117.06 LBS | SYSTOLIC BLOOD PRESSURE: 144 MMHG | HEART RATE: 64 BPM | HEIGHT: 63 IN | DIASTOLIC BLOOD PRESSURE: 86 MMHG | BODY MASS INDEX: 20.74 KG/M2

## 2020-02-26 DIAGNOSIS — E78.49 OTHER HYPERLIPIDEMIA: Primary | ICD-10-CM

## 2020-02-26 DIAGNOSIS — Z72.0 TOBACCO USE: ICD-10-CM

## 2020-02-26 DIAGNOSIS — R20.0 BILATERAL NUMBNESS AND TINGLING OF ARMS AND LEGS: ICD-10-CM

## 2020-02-26 DIAGNOSIS — R53.83 OTHER FATIGUE: ICD-10-CM

## 2020-02-26 DIAGNOSIS — I10 ESSENTIAL HYPERTENSION: ICD-10-CM

## 2020-02-26 DIAGNOSIS — R07.2 PRECORDIAL PAIN: ICD-10-CM

## 2020-02-26 DIAGNOSIS — R20.2 BILATERAL NUMBNESS AND TINGLING OF ARMS AND LEGS: ICD-10-CM

## 2020-02-26 DIAGNOSIS — Z82.49 FAMILY HISTORY OF PREMATURE CAD: ICD-10-CM

## 2020-02-26 DIAGNOSIS — I73.9 PERIPHERAL VASCULAR DISEASE WITH CLAUDICATION (HCC): ICD-10-CM

## 2020-02-26 DIAGNOSIS — E78.49 OTHER HYPERLIPIDEMIA: ICD-10-CM

## 2020-02-26 DIAGNOSIS — R94.31 ABNORMAL EKG: ICD-10-CM

## 2020-02-26 DIAGNOSIS — R94.39 ABNORMAL STRESS TEST: ICD-10-CM

## 2020-02-26 LAB
BH CV STRESS BP STAGE 2: NORMAL
BH CV STRESS BP STAGE 4: NORMAL
BH CV STRESS COMMENTS STAGE 1: NORMAL
BH CV STRESS DOSE REGADENOSON STAGE 1: 0.4
BH CV STRESS DURATION MIN STAGE 1: 1
BH CV STRESS DURATION MIN STAGE 2: 1
BH CV STRESS DURATION MIN STAGE 3: 1
BH CV STRESS DURATION MIN STAGE 4: 1
BH CV STRESS DURATION SEC STAGE 1: 0
BH CV STRESS DURATION SEC STAGE 2: 0
BH CV STRESS DURATION SEC STAGE 3: 0
BH CV STRESS DURATION SEC STAGE 4: 0
BH CV STRESS HR STAGE 1: 105
BH CV STRESS HR STAGE 2: 104
BH CV STRESS HR STAGE 3: 97
BH CV STRESS HR STAGE 4: 95
BH CV STRESS O2 STAGE 1: 96
BH CV STRESS O2 STAGE 2: 98
BH CV STRESS O2 STAGE 3: 98
BH CV STRESS O2 STAGE 4: 97
BH CV STRESS PROTOCOL 1: NORMAL
BH CV STRESS STAGE 1: 1
BH CV STRESS STAGE 2: 2
BH CV STRESS STAGE 3: 3
BH CV STRESS STAGE 4: 4
LV EF NUC BP: 64 %
MAXIMAL PREDICTED HEART RATE: 165 BPM
STRESS BASELINE BP: NORMAL MMHG
STRESS BASELINE HR: 67 BPM
STRESS O2 SAT REST: 98 %
STRESS POST ESTIMATED WORKLOAD: 1 METS
STRESS POST EXERCISE DUR MIN: 4 MIN
STRESS POST EXERCISE DUR SEC: 0 SEC
STRESS POST O2 SAT PEAK: 98 %
STRESS TARGET HR: 140 BPM

## 2020-02-26 PROCEDURE — 0 TECHNETIUM SESTAMIBI: Performed by: NURSE PRACTITIONER

## 2020-02-26 PROCEDURE — 93018 CV STRESS TEST I&R ONLY: CPT | Performed by: INTERNAL MEDICINE

## 2020-02-26 PROCEDURE — 93017 CV STRESS TEST TRACING ONLY: CPT

## 2020-02-26 PROCEDURE — 78452 HT MUSCLE IMAGE SPECT MULT: CPT

## 2020-02-26 PROCEDURE — 25010000002 REGADENOSON 0.4 MG/5ML SOLUTION: Performed by: NURSE PRACTITIONER

## 2020-02-26 PROCEDURE — A9500 TC99M SESTAMIBI: HCPCS | Performed by: NURSE PRACTITIONER

## 2020-02-26 PROCEDURE — 78452 HT MUSCLE IMAGE SPECT MULT: CPT | Performed by: INTERNAL MEDICINE

## 2020-02-26 RX ADMIN — TECHNETIUM TC 99M SESTAMIBI 1 DOSE: 1 INJECTION INTRAVENOUS at 10:10

## 2020-02-26 RX ADMIN — TECHNETIUM TC 99M SESTAMIBI 1 DOSE: 1 INJECTION INTRAVENOUS at 08:20

## 2020-02-26 RX ADMIN — REGADENOSON 0.4 MG: 0.08 INJECTION, SOLUTION INTRAVENOUS at 10:10

## 2020-02-27 RX ORDER — METOPROLOL SUCCINATE 25 MG/1
TABLET, EXTENDED RELEASE ORAL
Qty: 30 TABLET | Refills: 3 | Status: SHIPPED | OUTPATIENT
Start: 2020-02-27 | End: 2021-04-13 | Stop reason: SDUPTHER

## 2020-02-27 NOTE — TELEPHONE ENCOUNTER
Reviewed stress test with patient.  Stress test was read by Dr. Mari and showed no reversible ischemic defects.  Small size moderate intensity basal anterior wall defect fixed was noted.  CT showed moderate to heavy coronary artery calcification.  This is an abnormal intermediate study.  Patient to continue aspirin and Lipitor will begin Toprol 12.5 mg daily.    Ambulatory referral to cardiology     Patient verbalized understanding.

## 2020-03-02 ENCOUNTER — HOSPITAL ENCOUNTER (OUTPATIENT)
Dept: CT IMAGING | Facility: HOSPITAL | Age: 55
Discharge: HOME | End: 2020-03-02
Admitting: FAMILY MEDICINE

## 2020-03-02 DIAGNOSIS — F17.210 CIGARETTE SMOKER: ICD-10-CM

## 2020-03-02 PROCEDURE — G0297 LDCT FOR LUNG CA SCREEN: HCPCS

## 2020-03-25 NOTE — PROGRESS NOTES
[facility]  Consultation History and Physical  Sera Guidry  1965    VISIT DATE:  03/26/20    PCP:   Pooja Perdomo MD  [address]  [facility] [address]      CC:  Chest Pain      Problem List:  1.  HTN  2.  HLD  3.  Tobacco Abuse  4.  PVD   -CTA shows occluded right external iliac    Stress test 2/26/2020  · Patient complained of nausea with Lexiscan infusion. No chest pain  · Patient with transient T wave inversions in the inferior leads as well as V4-V6, which resolved in recovery. There was no ST segment depression  · Perfusion images show small sized, moderate intensity basal anterior wall defect which is fixed. The defect does not correct with attenuation correction. There were no reversible ischemic defects noted.  · Left ventricular ejection fraction is normal (Calculated EF = 64%).  · CT portion of the exam shows moderate to heavy coronary artery calcification  · Abnormal study, intermediate risk study. Transient T wave inversions without diagnostic ST segment depression. Fixed anterior wall defect that fails to correct with attenuation correction. Possible small area of anterior wall scar. No perfusion evidence of ischemia (no reversible defects).    History of Present Illness:  Sera Guidry  Is a 54 y.o. female with pertinent cardiac history detailed above.  Patient referred for evaluation of chest pain.  Has been seen in the heart valve clinic by Raya GOTTLIEB  She had a pharmacologic stress test which caused some transient T wave inversions.  Perfusion images showed no reversible ischemic defects but there was a fixed anterior wall defect that did not resolve with attenuation correction.  There was moderate to severe coronary artery calcification her last echo in 2018 showed normal LV EF of 60%    Over the last year the patient has had chest pain symptoms which include a substernal burning with radiation to her back that is  worse with exertion such as going up stairs or doing moderate intensity work.  This is associated with nausea  and shortness of breath.  She states the pain can radiate into her right arm as well.  She has PRN nitroglycerin she does experience some relief with these.  She has had some episodes at rest.  Has been having increasing frequency of nitroglycerin.  She has multiple risk factors for CAD including hypertension hyperlipidemia ongoing tobacco use.  She also has established PAD with an known occluded right external iliac artery.  The patient works at [employer] and if she is busy on her feet she will have the symptoms as well.  With moderate exertion she also experiences bilateral claudication in her calves.    Patient Active Problem List    Diagnosis Date Noted   • Other hyperlipidemia 11/27/2018   • Chest pain 04/10/2018   • Peripheral vascular disease with claudication (CMS/[facility]) 04/10/2018   • Recurrent major depressive disorder, in partial remission (CMS/[facility]) 01/25/2018   • Leg cramps, sleep related 02/02/2017   • Encounter for screening colonoscopy 02/02/2017   • Osteoporosis screening 02/02/2017   • Breast cancer screening 02/02/2017   • Skin lesion 02/02/2017   • Hormone imbalance 02/02/2017   • Screening for cardiovascular condition 02/02/2017   • Fatigue 08/10/2016   • Hot flashes 08/10/2016   • Mood swings 08/10/2016   • Sciatica 07/26/2016   • Scoliosis 07/26/2016   • Anxiety 07/26/2016   • Tobacco use 07/26/2016       Allergies   Allergen Reactions   • Penicillins Hives       Social History     Socioeconomic History   • Marital status:      Spouse name: Not on file   • Number of children: 3   • Years of education: Not on file   • Highest education level: Not on file   Occupational History   • Occupation: [employer]   Tobacco Use   • Smoking status: Current Every Day Smoker     Packs/day: 0.50     Years: 40.00     Pack years: 20.00     Types: Cigarettes   • Smokeless tobacco: Never Used    Substance and Sexual Activity   • Alcohol use: Yes     Alcohol/week: 2.0 - 3.0 standard drinks     Types: 2 - 3 Cans of beer per week   • Drug use: No     Comment: quit MJ   • Sexual activity: Defer   Social History Narrative    Lives in [facility] with her .        caffeine use: 2 servings of coffee daily, occassionally soda sprite or 7up       Family History   Problem Relation Age of Onset   • Allergies Other    • ADD / ADHD Other    • Heart disease Other         cardiac disorder   • Stroke Other    • Depression Other    • Hypertension Other    • Lung cancer Other    • Uterine cancer Other    • Ovarian cancer Other    • Cancer Mother         left lung   • Stroke Mother         x 2   • Stroke Father         x 3   • Heart disease Father 55        heart attack   • No Known Problems Son    • No Known Problems Son    • No Known Problems Son    • No Known Problems Brother        Current Medications:    Current Outpatient Medications:   •  aspirin 81 MG EC tablet, Take 1 tablet by mouth Daily., Disp: 30 tablet, Rfl: 0  •  atorvastatin (LIPITOR) 80 MG tablet, Take 1 tablet by mouth Daily., Disp: 30 tablet, Rfl: 6  •  busPIRone (BUSPAR) 15 MG tablet, Take 1 tablet by mouth 2 (Two) Times a Day., Disp: 60 tablet, Rfl: 2  •  Cholecalciferol (VITAMIN D PO), Take 1,000 Units by mouth Daily., Disp: , Rfl:   •  citalopram (CeleXA) 20 MG tablet, Take 1 tablet by mouth Daily., Disp: 30 tablet, Rfl: 2  •  lisinopril (PRINIVIL,ZESTRIL) 10 MG tablet, Take 1 tablet by mouth Daily., Disp: 30 tablet, Rfl: 2  •  metoprolol succinate XL (TOPROL-XL) 25 MG 24 hr tablet, 1/2 tablet po qd, Disp: 30 tablet, Rfl: 3  •  Multiple Vitamins-Minerals (MULTIVITAMIN WOMEN PO), Take 1 tablet by mouth Daily., Disp: , Rfl:   •  nitroglycerin (NITROSTAT) 0.4 MG SL tablet, Place 1 tablet under the tongue Every 5 (Five) Minutes As Needed for Chest Pain. Take no more than 3 doses in 15 minutes., Disp: 100 tablet, Rfl: 0  •  omeprazole (PrilOSEC) 20  "MG capsule, Take 1 capsule by mouth every night at bedtime., Disp: 30 capsule, Rfl: 2  •  vitamin B-12 (CYANOCOBALAMIN) 500 MCG tablet, Take 500 mcg by mouth Daily., Disp: , Rfl:   •  vitamin E 200 UNIT capsule, Take 200 Units by mouth Daily., Disp: , Rfl:   •  isosorbide mononitrate (IMDUR) 30 MG 24 hr tablet, Take 1 tablet by mouth Daily., Disp: 30 tablet, Rfl: 6     Review of Systems   Cardiovascular: Positive for chest pain, claudication and dyspnea on exertion. Negative for near-syncope, orthopnea and syncope.   Respiratory: Negative for cough and shortness of breath.    Gastrointestinal: Positive for nausea.   All other systems reviewed and are negative.      Vitals:    03/26/20 0950   BP: 144/78   BP Location: Left arm   Patient Position: Sitting   Pulse: 65   Weight: 52.6 kg (116 lb)   Height: 160 cm (63\")       Physical Exam   Constitutional: She is oriented to person, place, and time. She appears well-developed and well-nourished.   HENT:   Head: Normocephalic and atraumatic.   Eyes: Conjunctivae and EOM are normal.   Neck: Neck supple.   No bruits   Cardiovascular: Normal rate, regular rhythm and normal heart sounds.   No murmur heard.  Left dorsalis pedis is 2+, right dorsalis pedis is diminished right popliteal is palpable   Pulmonary/Chest: Effort normal. She has no rales.   Abdominal: Soft. There is no tenderness.   Musculoskeletal: Normal range of motion. She exhibits no edema.   Neurological: She is alert and oriented to person, place, and time.   Skin: Skin is warm and dry. Capillary refill takes less than 2 seconds. No erythema.   Psychiatric: She has a normal mood and affect.       Diagnostic Data:    ECG 12 Lead  Date/Time: 3/26/2020 10:10 AM  Performed by: Osmar De Guzman MD  Authorized by: Osmar De Guzman MD   Previous ECG: no previous ECG available  Rhythm: sinus rhythm  Rate: normal  BPM: 65  QRS axis: normal  Other findings: bilateral atrial abnormality    Clinical " impression: abnormal EKG          Lab Results   Component Value Date    TRIG 76 02/17/2020    HDL 91 (H) 02/17/2020     Lab Results   Component Value Date    GLUCOSE 86 02/17/2020    BUN 9 02/17/2020    CREATININE 0.71 02/17/2020     02/17/2020    K 4.9 02/17/2020     02/17/2020    CO2 26.3 02/17/2020     Lab Results   Component Value Date    HGBA1C 5.80 (H) 01/25/2018     Lab Results   Component Value Date    WBC 9.70 02/17/2020    HGB 16.8 (H) 02/17/2020    HCT 47.5 (H) 02/17/2020     02/17/2020       Assessment:   Diagnosis Plan   1. Unstable angina (CMS/[facility])  Case Request Cath Lab: Coronary angiography   2. Other hyperlipidemia  atorvastatin (LIPITOR) 80 MG tablet   3. Peripheral vascular disease with claudication (CMS/[facility])  atorvastatin (LIPITOR) 80 MG tablet       Plan:      1.  Unstable Angina and abnormal stress test  -Patient with typical anginal symptoms occurring with increasing frequency and some episodes at rest, unstable angina  -abnormal stres test with anterior perfusion defect  -Multiple risk factors for CAD hypertension hyperlipidemia smoker and known PAD  -We will schedule cardiac catheterization, would like to have within next 1-2 weeks  -Continue aspirin statin beta-blocker, and isosorbide 30 mg daily  -Moderate to severe coronary artery calcification on CT portion of stress test  -EKG in clinic without acute ischemic changes    2.  PAD  -Occluded right external iliac, follows with Dr. Clarke  -Endorses claudication  -Both feet pink and viable, unable to palpate definite- right dorsalis pedis pulse, popliteal is intact  -Has lower extremity duplex pending  -Continue medical management for CAD/PAD as detailed above    3.  HTN  -Mildly elevated on lisinopril and Toprol.  Adding isosorbide    4.  HLD  Lipids February 2020 total cholesterol 227, HDL 91, , triglycerides 77  Increase atorvastatin 80 mg for goal LDL less than 100, ideally less than 70    5.  Tobacco  abuse  -Patient has tried Chantix and nicotine replacement therapy without success.  Declines alternative measures today.  Smoking half pack cigarettes per day    Follow-up after cardiac catheterization pending results, feel catheterization is indicated given unstable angina type pattern to her symptoms and multiple risk factors      Osmar De Guzman MD

## 2020-03-26 ENCOUNTER — CONSULT (OUTPATIENT)
Dept: CARDIOLOGY | Facility: CLINIC | Age: 55
End: 2020-03-26

## 2020-03-26 VITALS
DIASTOLIC BLOOD PRESSURE: 78 MMHG | HEIGHT: 63 IN | WEIGHT: 116 LBS | SYSTOLIC BLOOD PRESSURE: 144 MMHG | BODY MASS INDEX: 20.55 KG/M2 | HEART RATE: 65 BPM

## 2020-03-26 DIAGNOSIS — E78.49 OTHER HYPERLIPIDEMIA: ICD-10-CM

## 2020-03-26 DIAGNOSIS — I73.9 PERIPHERAL VASCULAR DISEASE WITH CLAUDICATION (HCC): ICD-10-CM

## 2020-03-26 DIAGNOSIS — I20.0 UNSTABLE ANGINA (HCC): Primary | ICD-10-CM

## 2020-03-26 PROCEDURE — 93000 ELECTROCARDIOGRAM COMPLETE: CPT | Performed by: INTERNAL MEDICINE

## 2020-03-26 PROCEDURE — 99245 OFF/OP CONSLTJ NEW/EST HI 55: CPT | Performed by: INTERNAL MEDICINE

## 2020-03-26 RX ORDER — ATORVASTATIN CALCIUM 80 MG/1
80 TABLET, FILM COATED ORAL DAILY
Qty: 30 TABLET | Refills: 6 | Status: SHIPPED | OUTPATIENT
Start: 2020-03-26 | End: 2021-02-16 | Stop reason: SDUPTHER

## 2020-03-26 RX ORDER — ISOSORBIDE MONONITRATE 30 MG/1
30 TABLET, EXTENDED RELEASE ORAL DAILY
Qty: 30 TABLET | Refills: 6 | Status: SHIPPED | OUTPATIENT
Start: 2020-03-26 | End: 2020-12-22

## 2020-03-30 PROBLEM — I20.0 UNSTABLE ANGINA (HCC): Status: ACTIVE | Noted: 2020-03-30

## 2020-04-01 ENCOUNTER — HOSPITAL ENCOUNTER (OUTPATIENT)
Facility: HOSPITAL | Age: 55
Setting detail: HOSPITAL OUTPATIENT SURGERY
Discharge: HOME | End: 2020-04-01
Attending: INTERNAL MEDICINE | Admitting: INTERNAL MEDICINE

## 2020-04-01 VITALS
HEIGHT: 63 IN | HEART RATE: 59 BPM | RESPIRATION RATE: 14 BRPM | WEIGHT: 116.4 LBS | DIASTOLIC BLOOD PRESSURE: 95 MMHG | SYSTOLIC BLOOD PRESSURE: 168 MMHG | TEMPERATURE: 98 F | OXYGEN SATURATION: 92 % | BODY MASS INDEX: 20.62 KG/M2

## 2020-04-01 DIAGNOSIS — I20.0 UNSTABLE ANGINA (HCC): ICD-10-CM

## 2020-04-01 LAB
ACT BLD: 362 SECONDS (ref 82–152)
ANION GAP SERPL CALCULATED.3IONS-SCNC: 11 MMOL/L (ref 5–15)
BASOPHILS # BLD AUTO: 0.08 10*3/MM3 (ref 0–0.2)
BASOPHILS NFR BLD AUTO: 0.9 % (ref 0–1.5)
BUN BLD-MCNC: 10 MG/DL (ref 6–20)
BUN/CREAT SERPL: 16.9 (ref 7–25)
CALCIUM SPEC-SCNC: 9.1 MG/DL (ref 8.6–10.5)
CHLORIDE SERPL-SCNC: 102 MMOL/L (ref 98–107)
CO2 SERPL-SCNC: 26 MMOL/L (ref 22–29)
CREAT BLD-MCNC: 0.59 MG/DL (ref 0.57–1)
DEPRECATED RDW RBC AUTO: 46.7 FL (ref 37–54)
EOSINOPHIL # BLD AUTO: 0.25 10*3/MM3 (ref 0–0.4)
EOSINOPHIL NFR BLD AUTO: 2.9 % (ref 0.3–6.2)
ERYTHROCYTE [DISTWIDTH] IN BLOOD BY AUTOMATED COUNT: 12.8 % (ref 12.3–15.4)
GFR SERPL CREATININE-BSD FRML MDRD: 106 ML/MIN/1.73
GLUCOSE BLD-MCNC: 88 MG/DL (ref 65–99)
HCT VFR BLD AUTO: 44.8 % (ref 34–46.6)
HGB BLD-MCNC: 15.4 G/DL (ref 12–15.9)
IMM GRANULOCYTES # BLD AUTO: 0.04 10*3/MM3 (ref 0–0.05)
IMM GRANULOCYTES NFR BLD AUTO: 0.5 % (ref 0–0.5)
LYMPHOCYTES # BLD AUTO: 1.96 10*3/MM3 (ref 0.7–3.1)
LYMPHOCYTES NFR BLD AUTO: 22.9 % (ref 19.6–45.3)
MCH RBC QN AUTO: 33.9 PG (ref 26.6–33)
MCHC RBC AUTO-ENTMCNC: 34.4 G/DL (ref 31.5–35.7)
MCV RBC AUTO: 98.7 FL (ref 79–97)
MONOCYTES # BLD AUTO: 0.6 10*3/MM3 (ref 0.1–0.9)
MONOCYTES NFR BLD AUTO: 7 % (ref 5–12)
NEUTROPHILS # BLD AUTO: 5.63 10*3/MM3 (ref 1.7–7)
NEUTROPHILS NFR BLD AUTO: 65.8 % (ref 42.7–76)
NRBC BLD AUTO-RTO: 0 /100 WBC (ref 0–0.2)
PLATELET # BLD AUTO: 285 10*3/MM3 (ref 140–450)
PMV BLD AUTO: 9.2 FL (ref 6–12)
POTASSIUM BLD-SCNC: 4 MMOL/L (ref 3.5–5.2)
RBC # BLD AUTO: 4.54 10*6/MM3 (ref 3.77–5.28)
SODIUM BLD-SCNC: 139 MMOL/L (ref 136–145)
WBC NRBC COR # BLD: 8.56 10*3/MM3 (ref 3.4–10.8)

## 2020-04-01 PROCEDURE — 36415 COLL VENOUS BLD VENIPUNCTURE: CPT

## 2020-04-01 PROCEDURE — C1894 INTRO/SHEATH, NON-LASER: HCPCS | Performed by: INTERNAL MEDICINE

## 2020-04-01 PROCEDURE — 93458 L HRT ARTERY/VENTRICLE ANGIO: CPT | Performed by: INTERNAL MEDICINE

## 2020-04-01 PROCEDURE — 93571 IV DOP VEL&/PRESS C FLO 1ST: CPT | Performed by: INTERNAL MEDICINE

## 2020-04-01 PROCEDURE — C1769 GUIDE WIRE: HCPCS | Performed by: INTERNAL MEDICINE

## 2020-04-01 PROCEDURE — 93572 IV DOP VEL&/PRESS C FLO EA: CPT | Performed by: INTERNAL MEDICINE

## 2020-04-01 PROCEDURE — 25010000002 HEPARIN (PORCINE) PER 1000 UNITS: Performed by: INTERNAL MEDICINE

## 2020-04-01 PROCEDURE — 85347 COAGULATION TIME ACTIVATED: CPT

## 2020-04-01 PROCEDURE — C1887 CATHETER, GUIDING: HCPCS | Performed by: INTERNAL MEDICINE

## 2020-04-01 PROCEDURE — 0 IOPAMIDOL PER 1 ML: Performed by: INTERNAL MEDICINE

## 2020-04-01 PROCEDURE — 80048 BASIC METABOLIC PNL TOTAL CA: CPT | Performed by: INTERNAL MEDICINE

## 2020-04-01 PROCEDURE — 25010000002 FENTANYL CITRATE (PF) 100 MCG/2ML SOLUTION: Performed by: INTERNAL MEDICINE

## 2020-04-01 PROCEDURE — 85025 COMPLETE CBC W/AUTO DIFF WBC: CPT | Performed by: INTERNAL MEDICINE

## 2020-04-01 PROCEDURE — 25010000002 MIDAZOLAM PER 1 MG: Performed by: INTERNAL MEDICINE

## 2020-04-01 RX ORDER — HEPARIN SODIUM 1000 [USP'U]/ML
INJECTION, SOLUTION INTRAVENOUS; SUBCUTANEOUS AS NEEDED
Status: DISCONTINUED | OUTPATIENT
Start: 2020-04-01 | End: 2020-04-01 | Stop reason: HOSPADM

## 2020-04-01 RX ORDER — FENTANYL CITRATE 50 UG/ML
INJECTION, SOLUTION INTRAMUSCULAR; INTRAVENOUS AS NEEDED
Status: DISCONTINUED | OUTPATIENT
Start: 2020-04-01 | End: 2020-04-01 | Stop reason: HOSPADM

## 2020-04-01 RX ORDER — SODIUM CHLORIDE 9 MG/ML
3 INJECTION, SOLUTION INTRAVENOUS CONTINUOUS
Status: ACTIVE | OUTPATIENT
Start: 2020-04-01 | End: 2020-04-01

## 2020-04-01 RX ORDER — LIDOCAINE HYDROCHLORIDE 10 MG/ML
INJECTION, SOLUTION EPIDURAL; INFILTRATION; INTRACAUDAL; PERINEURAL AS NEEDED
Status: DISCONTINUED | OUTPATIENT
Start: 2020-04-01 | End: 2020-04-01 | Stop reason: HOSPADM

## 2020-04-01 RX ORDER — MIDAZOLAM HYDROCHLORIDE 1 MG/ML
INJECTION INTRAMUSCULAR; INTRAVENOUS AS NEEDED
Status: DISCONTINUED | OUTPATIENT
Start: 2020-04-01 | End: 2020-04-01 | Stop reason: HOSPADM

## 2020-04-01 NOTE — INTERVAL H&P NOTE
H&P reviewed. The patient was examined and there are no changes to the H&P.      Patient presents today for [facility] plus/minus [facility] for [place], abnormal stress test. The risks, benefits, and alternative options of cardiac catheterization were discussed with the patient.  The risks include death, MI, stroke, infection, vascular injury requiring surgical repair and/or blood transfusion, coronary dissection, renal dysfunction/failure, allergic reaction, emergent CABG.  If PCI is needed there is a 1-2% risk of emergent CABG.  The patient is agreeable for cardiac catheterization, possible PCI or CABG. Plan is to proceed with cardiac catheterization and possible PCI.     Proceed as planned.     Electronically signed by BULL Rainey, 04/01/20, 7:32 AM.    Assessed pt and chart, Discussed plan with pt, all in agreement.     Chacorta Mohan M.D., F.A.C.C.  Interventional Cardiology  04/01/20  07:37

## 2020-06-11 ENCOUNTER — TELEPHONE (OUTPATIENT)
Dept: INTERNAL MEDICINE | Facility: CLINIC | Age: 55
End: 2020-06-11

## 2020-06-11 NOTE — TELEPHONE ENCOUNTER
Spoke with Sera and gave her to website to [email or web address] for her to go on to and look up all locations that are offering testing. I let her know that she should call them and check to see if they will test without symptoms. Some are walk-in, registering online or by appt only. I did let her know that I though that [facility] location was offering testing without symptoms but she would need to register online. She verbalized understanding.

## 2020-06-11 NOTE — TELEPHONE ENCOUNTER
Patient is calling wanting to know where she can get tested for COVID-19. Patient is not having symptoms that she is aware of but she works at [employer] and her  has health issues and she wants to be sure she does not have it. Please advise and call back    [phone]

## 2020-08-17 PROCEDURE — U0003 INFECTIOUS AGENT DETECTION BY NUCLEIC ACID (DNA OR RNA); SEVERE ACUTE RESPIRATORY SYNDROME CORONAVIRUS 2 (SARS-COV-2) (CORONAVIRUS DISEASE [COVID-19]), AMPLIFIED PROBE TECHNIQUE, MAKING USE OF HIGH THROUGHPUT TECHNOLOGIES AS DESCRIBED BY CMS-2020-01-R: HCPCS | Performed by: FAMILY MEDICINE

## 2020-08-19 ENCOUNTER — TELEPHONE (OUTPATIENT)
Dept: URGENT CARE | Facility: CLINIC | Age: 55
End: 2020-08-19

## 2020-08-19 NOTE — TELEPHONE ENCOUNTER
Informed patient of negative labs.  Advised to follow stay home guildelines for next 6 days.  Follow up with pcp if symptoms persist.  FF 8/19/2020

## 2020-09-29 ENCOUNTER — FLU SHOT (OUTPATIENT)
Dept: INTERNAL MEDICINE | Facility: CLINIC | Age: 55
End: 2020-09-29

## 2020-09-29 DIAGNOSIS — Z23 NEED FOR INFLUENZA VACCINATION: ICD-10-CM

## 2020-09-29 PROCEDURE — 90686 IIV4 VACC NO PRSV 0.5 ML IM: CPT | Performed by: FAMILY MEDICINE

## 2020-09-29 PROCEDURE — 90471 IMMUNIZATION ADMIN: CPT | Performed by: FAMILY MEDICINE

## 2021-01-18 ENCOUNTER — OFFICE VISIT (OUTPATIENT)
Dept: INTERNAL MEDICINE | Facility: CLINIC | Age: 56
End: 2021-01-18

## 2021-01-18 VITALS
BODY MASS INDEX: 19.77 KG/M2 | DIASTOLIC BLOOD PRESSURE: 78 MMHG | SYSTOLIC BLOOD PRESSURE: 148 MMHG | RESPIRATION RATE: 16 BRPM | WEIGHT: 123 LBS | HEIGHT: 66 IN

## 2021-01-18 DIAGNOSIS — M25.511 ACUTE PAIN OF RIGHT SHOULDER: Primary | ICD-10-CM

## 2021-01-18 DIAGNOSIS — J44.9 CHRONIC OBSTRUCTIVE PULMONARY DISEASE, UNSPECIFIED COPD TYPE (HCC): Chronic | ICD-10-CM

## 2021-01-18 DIAGNOSIS — I10 ESSENTIAL HYPERTENSION: Chronic | ICD-10-CM

## 2021-01-18 PROCEDURE — 99214 OFFICE O/P EST MOD 30 MIN: CPT | Performed by: NURSE PRACTITIONER

## 2021-01-18 RX ORDER — LISINOPRIL 10 MG/1
10 TABLET ORAL DAILY
Qty: 30 TABLET | Refills: 1 | Status: SHIPPED | OUTPATIENT
Start: 2021-01-18 | End: 2021-04-13 | Stop reason: SDUPTHER

## 2021-01-18 RX ORDER — NAPROXEN 500 MG/1
500 TABLET ORAL 2 TIMES DAILY PRN
Qty: 30 TABLET | Refills: 0 | Status: SHIPPED | OUTPATIENT
Start: 2021-01-18 | End: 2021-05-12

## 2021-01-18 RX ORDER — ALBUTEROL SULFATE 90 UG/1
2 AEROSOL, METERED RESPIRATORY (INHALATION) EVERY 4 HOURS PRN
Qty: 18 G | Refills: 2 | Status: SHIPPED | OUTPATIENT
Start: 2021-01-18 | End: 2021-02-16 | Stop reason: SDUPTHER

## 2021-01-18 NOTE — PROGRESS NOTES
Chief Complaint  Hypertension (running high), Shoulder Pain (went to  in December-still hurting right shoulder), and COPD (needs albuterol inhaler)    Subjective          Sera Guidry presents to [facility] for     Shoulder Injury   There was no injury mechanism. The quality of the pain is described as aching. The pain is moderate. Pertinent negatives include no chest pain, muscle weakness, numbness or tingling. The symptoms are aggravated by overhead lifting and movement. She has tried nothing for the symptoms. The treatment provided no relief.   saw  12/22/2020 for this and has not gotten any better.  She initially felt like she may have slept on her arm wrong.  She has had no injuries or traumas.    Hypertension-chronic.  This is currently uncontrolled.  BP was quite high when she was in urgent care in December at 189/105.  She was out of her blood pressure medications at that time and has not been taking them in greater than a month.  Typically takes lisinopril 10 mg daily and BP has done well with this in the past.    Patient has COPD.  She does not have an inhaler.  She would like to have an albuterol inhaler for as needed use.  She reports she has rarely felt the need for it but would feel better having a rescue inhaler available      Allergies   Allergen Reactions   • Penicillins Hives     Current Outpatient Medications on File Prior to Visit   Medication Sig Dispense Refill   • aspirin 81 MG EC tablet Take 1 tablet by mouth Daily. 30 tablet 0   • atorvastatin (LIPITOR) 80 MG tablet Take 1 tablet by mouth Daily. 30 tablet 6   • busPIRone (BUSPAR) 15 MG tablet Take 1 tablet by mouth 2 (Two) Times a Day. (Patient taking differently: Take 15 mg by mouth Daily.) 60 tablet 2   • Cholecalciferol (VITAMIN D PO) Take 1,000 Units by mouth Daily.     • citalopram (CeleXA) 20 MG tablet Take 1 tablet by mouth Daily. 30 tablet 2   • metoprolol succinate XL (TOPROL-XL) 25 MG  "24 hr tablet 1/2 tablet po qd 30 tablet 3   • Multiple Vitamins-Minerals (MULTIVITAMIN WOMEN PO) Take 1 tablet by mouth Daily.     • nitroglycerin (NITROSTAT) 0.4 MG SL tablet Place 1 tablet under the tongue Every 5 (Five) Minutes As Needed for Chest Pain. Take no more than 3 doses in 15 minutes. 100 tablet 0   • omeprazole (PrilOSEC) 20 MG capsule Take 1 capsule by mouth every night at bedtime. 30 capsule 2   • vitamin B-12 (CYANOCOBALAMIN) 500 MCG tablet Take 500 mcg by mouth Daily.     • vitamin E 200 UNIT capsule Take 200 Units by mouth Daily.       No current facility-administered medications on file prior to visit.          The following portions of the patient's history were reviewed and updated as appropriate: allergies, current medications, past family history, past medical history, past social history, past surgical history and problem list and are available for review within electronic record    Objective     Vital Signs:   /78   Resp 16   Ht 167.6 cm (66\")   Wt 55.8 kg (123 lb)   BMI 19.85 kg/m²         Physical Exam  Constitutional:       Appearance: Normal appearance. She is well-developed.   HENT:      Head: Normocephalic and atraumatic.   Eyes:      Conjunctiva/sclera: Conjunctivae normal.      Pupils: Pupils are equal, round, and reactive to light.   Neck:      Musculoskeletal: Normal range of motion.   Cardiovascular:      Rate and Rhythm: Normal rate and regular rhythm.      Heart sounds: Normal heart sounds.   Pulmonary:      Effort: Pulmonary effort is normal.      Breath sounds: Normal breath sounds.   Abdominal:      General: Bowel sounds are normal.      Palpations: Abdomen is soft.      Tenderness: There is no abdominal tenderness.   Musculoskeletal:      Right shoulder: She exhibits decreased range of motion and pain. She exhibits no tenderness, no bony tenderness, no swelling, no effusion, no crepitus, no deformity, no laceration, no spasm, normal pulse and normal strength.      " Comments: Normal empty can test and drop arm test   Skin:     General: Skin is warm and dry.   Neurological:      Mental Status: She is alert and oriented to person, place, and time.   Psychiatric:         Behavior: Behavior normal.         Thought Content: Thought content normal.         Judgment: Judgment normal.          Result Review :   The following data was reviewed by: BULL Lay on 01/18/2021:  \plain  Data reviewed: Consultant notes Urgent care provider note from 12/22/2020              Assessment and Plan      Diagnoses and all orders for this visit:    1. Acute pain of right shoulder (Primary)  Assessment & Plan:  Naproxen as needed.  Advised to use sparingly and monitor blood pressure to ensure that naproxen does not adversely elevate the BP.  We will also go ahead and refer her to PT for evaluation and management of the shoulder pain.  No red flag symptoms today requiring imaging.  If worsening of symptoms or PT does not resolve her pain we can consider imaging at that time    Orders:  -     Ambulatory Referral to Physical Therapy Evaluate and treat  -     naproxen (Naprosyn) 500 MG tablet; Take 1 tablet by mouth 2 (Two) Times a Day As Needed for Moderate Pain .  Dispense: 30 tablet; Refill: 0    2. Essential hypertension  Assessment & Plan:  Hypertension is uncontrolled.  She has been off her medications for greater than 1 month.  We will restart her on her lisinopril 10 mg daily.  Goal BP of less than 130/80.  Encourage low-sodium diet and increase physical activity.    Orders:  -     lisinopril (PRINIVIL,ZESTRIL) 10 MG tablet; Take 1 tablet by mouth Daily.  Dispense: 30 tablet; Refill: 1    3. Chronic obstructive pulmonary disease, unspecified COPD type (CMS/[facility])  Assessment & Plan:  She has rare shortness of breath.  Does not currently have a rescue inhaler.  Was sent in albuterol for as needed use.      Orders:  -     albuterol sulfate  (90 Base) MCG/ACT inhaler; Inhale 2 puffs  Every 4 (Four) Hours As Needed for Wheezing or Shortness of Air.  Dispense: 18 g; Refill: 2          Follow Up     Patient was given instructions and counseling regarding her condition or for health maintenance advice. Please see specific information pulled into the AVS if appropriate.   Any new medication's adverse effects have been discussed in detail with patient  Patient was encouraged to keep me informed of any acute changes, lack of improvement, or any new concerning symptoms.    Return in about 4 weeks (around 2/15/2021) for Recheck with PCP (Dr. DASILVA).    * Please note that portions of this note were completed with a voice recognition program. Efforts were made to edit the dictation but occasionally words are erroneously transcribed.

## 2021-01-21 ENCOUNTER — PRIOR AUTHORIZATION (OUTPATIENT)
Dept: INTERNAL MEDICINE | Facility: CLINIC | Age: 56
End: 2021-01-21

## 2021-01-21 PROBLEM — I10 ESSENTIAL HYPERTENSION: Status: ACTIVE | Noted: 2021-01-21

## 2021-01-21 PROBLEM — J44.9 CHRONIC OBSTRUCTIVE PULMONARY DISEASE (HCC): Status: ACTIVE | Noted: 2021-01-21

## 2021-01-21 PROBLEM — I10 ESSENTIAL HYPERTENSION: Chronic | Status: ACTIVE | Noted: 2021-01-21

## 2021-01-21 PROBLEM — J44.9 CHRONIC OBSTRUCTIVE PULMONARY DISEASE (HCC): Chronic | Status: ACTIVE | Noted: 2021-01-21

## 2021-01-21 PROBLEM — M25.511 ACUTE PAIN OF RIGHT SHOULDER: Chronic | Status: ACTIVE | Noted: 2021-01-21

## 2021-01-21 PROBLEM — M25.511 ACUTE PAIN OF RIGHT SHOULDER: Status: ACTIVE | Noted: 2021-01-21

## 2021-01-21 NOTE — ASSESSMENT & PLAN NOTE
Hypertension is uncontrolled.  She has been off her medications for greater than 1 month.  We will restart her on her lisinopril 10 mg daily.  Goal BP of less than 130/80.  Encourage low-sodium diet and increase physical activity.

## 2021-01-21 NOTE — ASSESSMENT & PLAN NOTE
She has rare shortness of breath.  Does not currently have a rescue inhaler.  Was sent in albuterol for as needed use.

## 2021-01-21 NOTE — ASSESSMENT & PLAN NOTE
Naproxen as needed.  Advised to use sparingly and monitor blood pressure to ensure that naproxen does not adversely elevate the BP.  We will also go ahead and refer her to PT for evaluation and management of the shoulder pain.  No red flag symptoms today requiring imaging.  If worsening of symptoms or PT does not resolve her pain we can consider imaging at that time

## 2021-02-04 PROCEDURE — U0004 COV-19 TEST NON-CDC HGH THRU: HCPCS | Performed by: NURSE PRACTITIONER

## 2021-02-05 ENCOUNTER — TELEPHONE (OUTPATIENT)
Dept: URGENT CARE | Facility: CLINIC | Age: 56
End: 2021-02-05

## 2021-02-05 NOTE — TELEPHONE ENCOUNTER
Patient notified of negative COVID-19 test results.  Instructed patient to follow CDC recommendations with 7 to 10-day quarantine after negative test and a Covid exposure.  Patient verbalizes understanding and agreement with plan of care.

## 2021-02-16 ENCOUNTER — OFFICE VISIT (OUTPATIENT)
Dept: INTERNAL MEDICINE | Facility: CLINIC | Age: 56
End: 2021-02-16

## 2021-02-16 DIAGNOSIS — R53.83 OTHER FATIGUE: ICD-10-CM

## 2021-02-16 DIAGNOSIS — E78.49 OTHER HYPERLIPIDEMIA: ICD-10-CM

## 2021-02-16 DIAGNOSIS — J44.9 CHRONIC OBSTRUCTIVE PULMONARY DISEASE, UNSPECIFIED COPD TYPE (HCC): Chronic | ICD-10-CM

## 2021-02-16 DIAGNOSIS — I73.9 PERIPHERAL VASCULAR DISEASE WITH CLAUDICATION (HCC): ICD-10-CM

## 2021-02-16 DIAGNOSIS — F33.41 RECURRENT MAJOR DEPRESSIVE DISORDER, IN PARTIAL REMISSION (HCC): Primary | ICD-10-CM

## 2021-02-16 PROCEDURE — 99443 PR PHYS/QHP TELEPHONE EVALUATION 21-30 MIN: CPT | Performed by: FAMILY MEDICINE

## 2021-02-16 RX ORDER — ATORVASTATIN CALCIUM 80 MG/1
80 TABLET, FILM COATED ORAL DAILY
Qty: 30 TABLET | Refills: 6 | Status: SHIPPED | OUTPATIENT
Start: 2021-02-16 | End: 2021-05-12 | Stop reason: SDUPTHER

## 2021-02-16 RX ORDER — ALBUTEROL SULFATE 90 UG/1
2 AEROSOL, METERED RESPIRATORY (INHALATION) EVERY 4 HOURS PRN
Qty: 18 G | Refills: 2 | Status: SHIPPED | OUTPATIENT
Start: 2021-02-16 | End: 2022-02-28 | Stop reason: SDUPTHER

## 2021-02-16 RX ORDER — BUPROPION HYDROCHLORIDE 150 MG/1
150 TABLET ORAL DAILY
Qty: 30 TABLET | Refills: 2 | Status: SHIPPED | OUTPATIENT
Start: 2021-02-16 | End: 2021-04-13 | Stop reason: SDUPTHER

## 2021-02-16 NOTE — PROGRESS NOTES
Subjective   Sera Guidry is a 55 y.o. female.     Chief Complaint   Patient presents with   • Follow-up   • Hyperlipidemia   • COPD       There were no vitals taken for this visit.        Hyperlipidemia  This is a chronic problem. The current episode started more than 1 year ago. Lipid results: pending. She has no history of chronic renal disease, diabetes, hypothyroidism, liver disease, obesity or nephrotic syndrome. Factors aggravating her hyperlipidemia include beta blockers. Associated symptoms include shortness of breath (worse in the cold). Pertinent negatives include no chest pain, focal sensory loss, focal weakness, leg pain or myalgias. She is currently on no antihyperlipidemic treatment. Compliance problems include psychosocial issues.  Risk factors for coronary artery disease include dyslipidemia, post-menopausal and family history.    .    Pt has been getting jittery. Pt has been taking her lisinopril.  Pt has not check BP today. Yesterday BP was normal.    Pt reports that she has cut back on smoking because of cough and congestion.   Pt has run out of albuterol. Will rewrite.     Pt ran out of her lipitor several months ago.    ____________________________________________________    You have chosen to receive care through a telephone visit. Do you consent to use a telephone visit for your medical care today? Yes    This visit has been rescheduled as a phone visit to comply with patient safety concerns in accordance with CDC recommendations. Total time of discussion was 22 minutes.    The following portions of the patient's history were reviewed and updated as appropriate: allergies, current medications, past family history, past medical history, past social history, past surgical history and problem list.    Past Medical History:   Diagnosis Date   • Allergy    • Anxiety    • Asthma    • Cervical cancer (CMS/[facility])    • Chest pain    • COPD (chronic obstructive pulmonary disease) (CMS/[facility])    •  Depression    • Endometriosis    • GERD (gastroesophageal reflux disease)    • H/O echocardiogram 04/17/2018    EF 60% mild TR   • Heart murmur    • Hypertension    • Osteoarthritis    • Other hyperlipidemia 11/27/2018   • Ovarian cancer (CMS/HCC)     endometrial/cervical   • Ovarian cyst    • Scoliosis       Past Surgical History:   Procedure Laterality Date   • APPENDECTOMY     • CARDIAC CATHETERIZATION N/A 4/1/2020    Procedure: Coronary angiography;  Surgeon: Chacorta Mohan MD;  [facility];  Service: Cardiology;  Laterality: N/A;   • COLONOSCOPY  09/11/2018    Dr Patrick   • HYSTERECTOMY     • ORIF WRIST FRACTURE Left 09/2017    with 2 plates   • TOTAL ABDOMINAL HYSTERECTOMY WITH SALPINGO OOPHORECTOMY      ovaries removed at 2 separate occcasions      Family History   Problem Relation Age of Onset   • Allergies Other    • ADD / ADHD Other    • Heart disease Other         cardiac disorder   • Stroke Other    • Depression Other    • Hypertension Other    • Lung cancer Other    • Uterine cancer Other    • Ovarian cancer Other    • Cancer Mother         left lung   • Stroke Mother         x 2   • Stroke Father         x 3   • Heart disease Father 55        heart attack   • No Known Problems Son    • No Known Problems Son    • No Known Problems Son    • No Known Problems Brother       Social History     Socioeconomic History   • Marital status:      Spouse name: Not on file   • Number of children: 3   • Years of education: Not on file   • Highest education level: Not on file   Occupational History   • Occupation: [employer]   Tobacco Use   • Smoking status: Current Every Day Smoker     Packs/day: 0.50     Years: 40.00     Pack years: 20.00     Types: Cigarettes   • Smokeless tobacco: Never Used   Substance and Sexual Activity   • Alcohol use: Yes     Alcohol/week: 14.0 standard drinks     Types: 14 Cans of beer per week   • Drug use: No     Comment: quit MJ   • Sexual activity:  Defer   Social History Narrative    Lives in [facility] with [place] .        caffeine use: 2 servings of coffee daily, occassionally soda sprite or 7up      Allergies   Allergen Reactions   • Penicillins Hives       Review of Systems   Constitutional: Positive for chills and fatigue (mild). Negative for diaphoresis and fever.   HENT: Positive for congestion and postnasal drip. Negative for ear pain, nosebleeds, rhinorrhea, sinus pressure, sneezing and sore throat.    Eyes: Negative.  Negative for redness and itching.   Respiratory: Positive for cough, shortness of breath (worse in the cold) and wheezing.    Cardiovascular: Negative.  Negative for chest pain and palpitations.   Gastrointestinal: Negative.  Negative for abdominal pain, constipation, diarrhea, nausea and vomiting.   Endocrine: Negative.  Negative for cold intolerance and heat intolerance.   Genitourinary: Negative.  Negative for dysuria, frequency, hematuria and urgency.   Musculoskeletal: Negative.  Negative for arthralgias, back pain, myalgias and neck pain.   Skin: Negative.  Negative for color change and rash.   Allergic/Immunologic: Negative.  Negative for environmental allergies.   Neurological: Negative.  Negative for dizziness, focal weakness, syncope, light-headedness and headaches.   Hematological: Negative.  Negative for adenopathy. Does not bruise/bleed easily.   Psychiatric/Behavioral: Negative for dysphoric mood and sleep disturbance. The patient is nervous/anxious.      PHQ-9 Depression Screening  Little interest or pleasure in doing things? 2   Feeling down, depressed, or hopeless? 3   Trouble falling or staying asleep, or sleeping too much? 3   Feeling tired or having little energy? 3   Poor appetite or overeating? 0   Feeling bad about yourself - or that you are a failure or have let yourself or your family down? 1   Trouble concentrating on things, such as reading the newspaper or watching television? 3   Moving or speaking so  slowly that other people could have noticed? Or the opposite - being so fidgety or restless that you have been moving around a lot more than usual? 3   Thoughts that you would be better off dead, or of hurting yourself in some way? 0   PHQ-9 Total Score 18   If you checked off any problems, how difficult have these problems made it for you to do your work, take care of things at home, or get along with other people? Somewhat difficult           Objective   Physical Exam  Neurological:      Mental Status: She is oriented to person, place, and time.   Psychiatric:         Mood and Affect: Mood normal.         Thought Content: Thought content normal.         Judgment: Judgment normal.         Assessment/Plan   Diagnoses and all orders for this visit:    1. Recurrent major depressive disorder, in partial remission (CMS/[facility]) (Primary)  -     buPROPion XL (Wellbutrin XL) 150 MG 24 hr tablet; Take 1 tablet by mouth Daily.  Dispense: 30 tablet; Refill: 2    2. Chronic obstructive pulmonary disease, unspecified COPD type (CMS/[facility])  -     albuterol sulfate  (90 Base) MCG/ACT inhaler; Inhale 2 puffs Every 4 (Four) Hours As Needed for Wheezing or Shortness of Air.  Dispense: 18 g; Refill: 2    3. Other hyperlipidemia  -     atorvastatin (LIPITOR) 80 MG tablet; Take 1 tablet by mouth Daily.  Dispense: 30 tablet; Refill: 6  -     Comprehensive Metabolic Panel; Future  -     Lipid Panel; Future    4. Peripheral vascular disease with claudication (CMS/[facility])  -     atorvastatin (LIPITOR) 80 MG tablet; Take 1 tablet by mouth Daily.  Dispense: 30 tablet; Refill: 6    5. Other fatigue  -     TSH Rfx On Abnormal To Free T4; Future  -     Vitamin B12; Future  -     Folate; Future  -     CBC & Differential; Future      Add wellbutrin 10 mg xl for depression  Restart lipitor  Check lab for fatigue.    Discussed smoking cessation         Current Outpatient Medications:   •  albuterol sulfate  (90 Base) MCG/ACT inhaler, Inhale 2  puffs Every 4 (Four) Hours As Needed for Wheezing or Shortness of Air., Disp: 18 g, Rfl: 2  •  aspirin 81 MG EC tablet, Take 1 tablet by mouth Daily., Disp: 30 tablet, Rfl: 0  •  atorvastatin (LIPITOR) 80 MG tablet, Take 1 tablet by mouth Daily., Disp: 30 tablet, Rfl: 6  •  busPIRone (BUSPAR) 15 MG tablet, Take 1 tablet by mouth 2 (Two) Times a Day. (Patient taking differently: Take 15 mg by mouth Daily.), Disp: 60 tablet, Rfl: 2  •  Cholecalciferol (VITAMIN D PO), Take 1,000 Units by mouth Daily., Disp: , Rfl:   •  citalopram (CeleXA) 20 MG tablet, Take 1 tablet by mouth Daily., Disp: 30 tablet, Rfl: 2  •  lisinopril (PRINIVIL,ZESTRIL) 10 MG tablet, Take 1 tablet by mouth Daily., Disp: 30 tablet, Rfl: 1  •  metoprolol succinate XL (TOPROL-XL) 25 MG 24 hr tablet, 1/2 tablet po qd, Disp: 30 tablet, Rfl: 3  •  Multiple Vitamins-Minerals (MULTIVITAMIN WOMEN PO), Take 1 tablet by mouth Daily., Disp: , Rfl:   •  naproxen (Naprosyn) 500 MG tablet, Take 1 tablet by mouth 2 (Two) Times a Day As Needed for Moderate Pain ., Disp: 30 tablet, Rfl: 0  •  nitroglycerin (NITROSTAT) 0.4 MG SL tablet, Place 1 tablet under the tongue Every 5 (Five) Minutes As Needed for Chest Pain. Take no more than 3 doses in 15 minutes., Disp: 100 tablet, Rfl: 0  •  omeprazole (PrilOSEC) 20 MG capsule, Take 1 capsule by mouth every night at bedtime., Disp: 30 capsule, Rfl: 2  •  vitamin B-12 (CYANOCOBALAMIN) 500 MCG tablet, Take 500 mcg by mouth Daily., Disp: , Rfl:   •  vitamin E 200 UNIT capsule, Take 200 Units by mouth Daily., Disp: , Rfl:   •  buPROPion XL (Wellbutrin XL) 150 MG 24 hr tablet, Take 1 tablet by mouth Daily., Disp: 30 tablet, Rfl: 2    Return in about 4 weeks (around 3/16/2021), or if symptoms worsen or fail to improve, for Recheck.

## 2021-03-31 ENCOUNTER — TELEPHONE (OUTPATIENT)
Dept: INTERNAL MEDICINE | Facility: CLINIC | Age: 56
End: 2021-03-31

## 2021-03-31 NOTE — TELEPHONE ENCOUNTER
Hub staff attempted to follow warm transfer process and was unsuccessful     Caller: Sera Guidry    Relationship to patient: Self    Best call back number: [phone]     Patient is needing: PATIENT CALLED TO GET INFORMATION ABOUT HOW SHE CAN GET THE COVID VACCINE.  PATIENT DOESN'T HAVE ACCESS TO THE INTERNET.

## 2021-04-13 ENCOUNTER — OFFICE VISIT (OUTPATIENT)
Dept: INTERNAL MEDICINE | Facility: CLINIC | Age: 56
End: 2021-04-13

## 2021-04-13 ENCOUNTER — LAB (OUTPATIENT)
Dept: LAB | Facility: HOSPITAL | Age: 56
End: 2021-04-13

## 2021-04-13 VITALS
TEMPERATURE: 98 F | HEART RATE: 76 BPM | BODY MASS INDEX: 18.68 KG/M2 | HEIGHT: 66 IN | SYSTOLIC BLOOD PRESSURE: 180 MMHG | WEIGHT: 116.2 LBS | OXYGEN SATURATION: 96 % | DIASTOLIC BLOOD PRESSURE: 96 MMHG

## 2021-04-13 DIAGNOSIS — E53.8 B12 DEFICIENCY: ICD-10-CM

## 2021-04-13 DIAGNOSIS — E55.9 VITAMIN D DEFICIENCY: ICD-10-CM

## 2021-04-13 DIAGNOSIS — I10 ESSENTIAL HYPERTENSION: Primary | Chronic | ICD-10-CM

## 2021-04-13 DIAGNOSIS — F41.9 ANXIETY: ICD-10-CM

## 2021-04-13 DIAGNOSIS — I73.9 PERIPHERAL VASCULAR DISEASE WITH CLAUDICATION (HCC): ICD-10-CM

## 2021-04-13 DIAGNOSIS — I10 ESSENTIAL HYPERTENSION: Chronic | ICD-10-CM

## 2021-04-13 DIAGNOSIS — R07.2 PRECORDIAL PAIN: ICD-10-CM

## 2021-04-13 DIAGNOSIS — G47.9 SLEEP DISTURBANCE: ICD-10-CM

## 2021-04-13 DIAGNOSIS — R53.83 OTHER FATIGUE: ICD-10-CM

## 2021-04-13 DIAGNOSIS — Z12.31 ENCOUNTER FOR SCREENING MAMMOGRAM FOR MALIGNANT NEOPLASM OF BREAST: ICD-10-CM

## 2021-04-13 DIAGNOSIS — F17.210 CIGARETTE SMOKER: ICD-10-CM

## 2021-04-13 DIAGNOSIS — F33.41 RECURRENT MAJOR DEPRESSIVE DISORDER, IN PARTIAL REMISSION (HCC): ICD-10-CM

## 2021-04-13 DIAGNOSIS — E78.49 OTHER HYPERLIPIDEMIA: ICD-10-CM

## 2021-04-13 PROCEDURE — 99214 OFFICE O/P EST MOD 30 MIN: CPT | Performed by: FAMILY MEDICINE

## 2021-04-13 RX ORDER — LISINOPRIL 10 MG/1
10 TABLET ORAL DAILY
Qty: 30 TABLET | Refills: 3 | Status: SHIPPED | OUTPATIENT
Start: 2021-04-13 | End: 2021-05-12 | Stop reason: DRUGHIGH

## 2021-04-13 RX ORDER — METOPROLOL SUCCINATE 25 MG/1
TABLET, EXTENDED RELEASE ORAL
Qty: 30 TABLET | Refills: 3 | Status: SHIPPED | OUTPATIENT
Start: 2021-04-13 | End: 2022-02-28 | Stop reason: SDUPTHER

## 2021-04-13 RX ORDER — NITROGLYCERIN 0.4 MG/1
0.4 TABLET SUBLINGUAL
Qty: 25 TABLET | Refills: 1 | Status: SHIPPED | OUTPATIENT
Start: 2021-04-13 | End: 2022-11-10 | Stop reason: SDUPTHER

## 2021-04-13 RX ORDER — BUSPIRONE HYDROCHLORIDE 15 MG/1
15 TABLET ORAL DAILY
Qty: 30 TABLET | Refills: 3 | Status: SHIPPED | OUTPATIENT
Start: 2021-04-13 | End: 2022-04-07 | Stop reason: SDUPTHER

## 2021-04-13 RX ORDER — BUPROPION HYDROCHLORIDE 150 MG/1
150 TABLET ORAL DAILY
Qty: 30 TABLET | Refills: 3 | Status: SHIPPED | OUTPATIENT
Start: 2021-04-13 | End: 2022-04-07 | Stop reason: SDUPTHER

## 2021-04-13 RX ORDER — CITALOPRAM 10 MG/1
10 TABLET ORAL DAILY
Qty: 30 TABLET | Refills: 3 | Status: SHIPPED | OUTPATIENT
Start: 2021-04-13 | End: 2022-04-07 | Stop reason: SDUPTHER

## 2021-04-13 NOTE — PROGRESS NOTES
"Subjective   Sera Guidry is a 55 y.o. female.     Chief Complaint   Patient presents with   • Depression     f/u. feels like the wellbutrin is working well   • Hypertension     had taken her bp med around 7:30 this morning and then 30 min later took her wellbutrin   • Leg Pain     instead of hurting at night, feels like pins and needles in her legs all the time, on her feet all the time at work       Visit Vitals  /96 (BP Location: Right arm, Patient Position: Sitting, Cuff Size: Adult)   Pulse 76   Temp 98 °F (36.7 °C)   Ht 167.6 cm (66\")   Wt 52.7 kg (116 lb 3.2 oz)   SpO2 96%   BMI 18.76 kg/m²         Depression  Visit Type: follow-up  Patient presents with the following symptoms: excessive worry, muscle tension, panic, restlessness and weight loss.  Patient is not experiencing: anhedonia, chest pain, choking sensation, compulsions, confusion, decreased concentration, depressed mood, dizziness, dry mouth, fatigue, feelings of hopelessness, feelings of worthlessness, hypersomnia, hyperventilation, insomnia, irritability, malaise, memory impairment, nausea, nervousness/anxiety, obsessions, palpitations, psychomotor agitation, psychomotor retardation, shortness of breath, suicidal ideas, suicidal planning, thoughts of death and weight gain.  Frequency of symptoms: most days   Severity: moderate   Sleep quality: poor  Nighttime awakenings: many  Compliance with medications:  51-75%        Hypertension  This is a chronic problem. The current episode started more than 1 year ago. The problem has been rapidly worsening since onset. Associated symptoms include sweats. Pertinent negatives include no anxiety, blurred vision, chest pain, headaches, malaise/fatigue, neck pain, orthopnea, palpitations, peripheral edema, PND or shortness of breath. There are no associated agents to hypertension. Risk factors for coronary artery disease include dyslipidemia, family history, post-menopausal state and smoking/tobacco " exposure. Past treatments include ACE inhibitors and beta blockers. Current antihypertension treatment includes ACE inhibitors. The current treatment provides no improvement. Compliance problems include psychosocial issues.  Hypertensive end-organ damage includes PVD. There is no history of angina, kidney disease, CAD/MI, CVA, heart failure, left ventricular hypertrophy or retinopathy. Identifiable causes of hypertension include sleep apnea. There is no history of a thyroid problem.   Leg Pain   Incident onset: 1 month of leg pain. There was no injury mechanism. The quality of the pain is described as burning (pins and needles). The pain is at a severity of 8/10. The pain is moderate. The pain has been constant since onset. Associated symptoms include tingling. Pertinent negatives include no inability to bear weight, loss of motion, loss of sensation, muscle weakness or numbness. The symptoms are aggravated by weight bearing. She has tried rest for the symptoms. The treatment provided moderate relief.   Hyperlipidemia  This is a chronic problem. The current episode started more than 1 year ago. Condition status: pending. She has no history of diabetes, hypothyroidism, liver disease, obesity or nephrotic syndrome. Associated symptoms include leg pain and myalgias. Pertinent negatives include no chest pain, focal sensory loss, focal weakness or shortness of breath. Current antihyperlipidemic treatment includes statins. Improvement on treatment: pending. Compliance problems include adherence to exercise.  Risk factors for coronary artery disease include hypertension, post-menopausal, dyslipidemia and family history.      Pt is smoking less than 1/2 ppd from 1 ppd.  Pt has smoked for about 4 yrs.  Pt ran out of toprol a few months ago and her BP has been elevated.  Pt is taking the lisinopril earlier this yr.    Pt is no longer taking the celexa. She feels that the wellbutrin is working well.     Pt has PAD and her legs  are tingling all the time.     Pt will get Covid vaccine on Thursday this week.     The following portions of the patient's history were reviewed and updated as appropriate: allergies, current medications, past family history, past medical history, past social history, past surgical history and problem list.    Past Medical History:   Diagnosis Date   • Allergy    • Anxiety    • Asthma    • Cervical cancer (CMS/HCC)    • Chest pain    • COPD (chronic obstructive pulmonary disease) (CMS/HCC)    • Depression    • Endometriosis    • GERD (gastroesophageal reflux disease)    • H/O echocardiogram 04/17/2018    EF 60% mild TR   • Heart murmur    • Hypertension    • Osteoarthritis    • Other hyperlipidemia 11/27/2018   • Ovarian cancer (CMS/[facility], [facility])     endometrial/cervical   • Ovarian cyst    • Scoliosis       Past Surgical History:   Procedure Laterality Date   • APPENDECTOMY     • CARDIAC CATHETERIZATION N/A 4/1/2020    Procedure: Coronary angiography;  Surgeon: Chacorta Mohan MD;  Location: [facility] CATH INVASIVE LOCATION;  Service: Cardiology;  Laterality: N/A;   • COLONOSCOPY  09/11/2018    Dr Patrick   • HYSTERECTOMY     • ORIF WRIST FRACTURE Left 09/2017    with 2 plates   • TOTAL ABDOMINAL HYSTERECTOMY WITH SALPINGO OOPHORECTOMY      ovaries removed at 2 separate occcasions      Family History   Problem Relation Age of Onset   • Allergies Other    • ADD / ADHD Other    • Heart disease Other         cardiac disorder   • Stroke Other    • Depression Other    • Hypertension Other    • Lung cancer Other    • Uterine cancer Other    • Ovarian cancer Other    • Cancer Mother         left lung   • Stroke Mother         x 2   • Stroke Father         x 3   • Heart disease Father 55        heart attack   • No Known Problems Son    • No Known Problems Son    • No Known Problems Son    • No Known Problems Brother       Social History     Socioeconomic History   • Marital status:      Spouse name: Not on file   • Number  of children: 3   • Years of education: Not on file   • Highest education level: Not on file   Tobacco Use   • Smoking status: Current Every Day Smoker     Packs/day: 0.50     Years: 40.00     Pack years: 20.00     Types: Cigarettes   • Smokeless tobacco: Never Used   Substance and Sexual Activity   • Alcohol use: Yes     Alcohol/week: 14.0 standard drinks     Types: 14 Cans of beer per week   • Drug use: No     Comment: quit MJ   • Sexual activity: Defer      Allergies   Allergen Reactions   • Penicillins Hives       Review of Systems   Constitutional: Positive for weight loss. Negative for irritability, malaise/fatigue and weight gain.   Eyes: Negative for blurred vision.   Respiratory: Negative for choking and shortness of breath.    Cardiovascular: Negative for chest pain, palpitations, orthopnea and PND.   Musculoskeletal: Positive for gait problem and myalgias. Negative for neck pain.   Neurological: Positive for tingling. Negative for focal weakness, numbness and headaches.   Psychiatric/Behavioral: Negative for confusion, decreased concentration and suicidal ideas. The patient is not nervous/anxious and does not have insomnia.      PHQ-9 Depression Screening  Little interest or pleasure in doing things? 3   Feeling down, depressed, or hopeless? 2   Trouble falling or staying asleep, or sleeping too much? 3   Feeling tired or having little energy? 3   Poor appetite or overeating? 0   Feeling bad about yourself - or that you are a failure or have let yourself or your family down? 0   Trouble concentrating on things, such as reading the newspaper or watching television? 1   Moving or speaking so slowly that other people could have noticed? Or the opposite - being so fidgety or restless that you have been moving around a lot more than usual? 0   Thoughts that you would be better off dead, or of hurting yourself in some way? 0   PHQ-9 Total Score 12   If you checked off any problems, how difficult have these  problems made it for you to do your work, take care of things at home, or get along with other people? Somewhat difficult         Objective   Physical Exam  Vitals and nursing note reviewed.   Constitutional:       Appearance: She is well-developed.   HENT:      Head: Normocephalic.        Right Ear: External ear normal.      Left Ear: External ear normal.      Nose: Nose normal.   Eyes:      General: Lids are normal.      Conjunctiva/sclera: Conjunctivae normal.      Pupils: Pupils are equal, round, and reactive to light.   Neck:      Thyroid: No thyroid mass or thyromegaly.      Vascular: No carotid bruit.      Trachea: Trachea normal.   Cardiovascular:      Rate and Rhythm: Normal rate and regular rhythm.      Pulses:           Dorsalis pedis pulses are 0 on the right side and 0 on the left side.        Posterior tibial pulses are 0 on the right side and 0 on the left side.      Heart sounds: No murmur heard.     Pulmonary:      Effort: Pulmonary effort is normal. No respiratory distress.      Breath sounds: Normal breath sounds. No decreased breath sounds, wheezing, rhonchi or rales.   Chest:      Chest wall: No tenderness.   Abdominal:      General: Bowel sounds are normal.      Palpations: Abdomen is soft.      Tenderness: There is no abdominal tenderness.   Musculoskeletal:         General: Normal range of motion.      Cervical back: Normal range of motion and neck supple.      Right foot: Normal range of motion. No deformity, bunion, Charcot foot, foot drop or prominent metatarsal heads.      Left foot: Normal range of motion. No deformity, bunion, Charcot foot, foot drop or prominent metatarsal heads.   Feet:      Right foot:      Protective Sensation: 7 sites tested. 7 sites sensed.      Skin integrity: Warmth and dry skin present. No ulcer, blister, skin breakdown, erythema, callus or fissure.      Left foot:      Protective Sensation: 7 sites tested. 7 sites sensed.      Skin integrity: Warmth and dry  skin present. No ulcer, blister, skin breakdown, erythema, callus or fissure.   Skin:     General: Skin is warm and dry.   Neurological:      Mental Status: She is alert and oriented to person, place, and time.   Psychiatric:         Behavior: Behavior normal.         Assessment/Plan   Diagnoses and all orders for this visit:    1. Essential hypertension (Primary)  -     metoprolol succinate XL (TOPROL-XL) 25 MG 24 hr tablet; 1/2 tablet po qd  Dispense: 30 tablet; Refill: 3  -     lisinopril (PRINIVIL,ZESTRIL) 10 MG tablet; Take 1 tablet by mouth Daily.  Dispense: 30 tablet; Refill: 3  -     Comprehensive Metabolic Panel; Future  -     TSH; Future  -     Lipid Panel; Future  -     CBC & Differential; Future    2. Anxiety  -     busPIRone (BUSPAR) 15 MG tablet; Take 1 tablet by mouth Daily.  Dispense: 30 tablet; Refill: 3    3. Recurrent major depressive disorder, in partial remission (CMS/HCC)  -     buPROPion XL (Wellbutrin XL) 150 MG 24 hr tablet; Take 1 tablet by mouth Daily.  Dispense: 30 tablet; Refill: 3  -     citalopram (CeleXA) 10 MG tablet; Take 1 tablet by mouth Daily.  Dispense: 30 tablet; Refill: 3    4. Encounter for screening mammogram for malignant neoplasm of breast  -     Mammo Screening Digital Tomosynthesis Bilateral With CAD; Future    5. Precordial pain  -     nitroglycerin (NITROSTAT) 0.4 MG SL tablet; Place 1 tablet under the tongue Every 5 (Five) Minutes As Needed for Chest Pain. Take no more than 3 doses in 15 minutes.  Dispense: 25 tablet; Refill: 1    6. Peripheral vascular disease with claudication (CMS/HCC)  -     Duplex Lower Extremity Art / Grafts - Bilateral CAR; Future    7. Cigarette smoker  -      CT Chest Low Dose Cancer Screening WO; Future    8. Sleep disturbance  -     Ambulatory Referral to Sleep Medicine    9. Other hyperlipidemia  -     Comprehensive Metabolic Panel; Future  -     Lipid Panel; Future    10. B12 deficiency  -     Vitamin B12; Future  -     Folate;  Future    11. Vitamin D deficiency  -     Vitamin D 25 Hydroxy; Future        Restart metoprolol  Restart celexa 10 mg  Pt not needing NTG at this time, but her bottle was 3 years old, will reorder           Current Outpatient Medications:   •  albuterol sulfate  (90 Base) MCG/ACT inhaler, Inhale 2 puffs Every 4 (Four) Hours As Needed for Wheezing or Shortness of Air., Disp: 18 g, Rfl: 2  •  aspirin 81 MG EC tablet, Take 1 tablet by mouth Daily., Disp: 30 tablet, Rfl: 0  •  atorvastatin (LIPITOR) 80 MG tablet, Take 1 tablet by mouth Daily., Disp: 30 tablet, Rfl: 6  •  buPROPion XL (Wellbutrin XL) 150 MG 24 hr tablet, Take 1 tablet by mouth Daily., Disp: 30 tablet, Rfl: 3  •  busPIRone (BUSPAR) 15 MG tablet, Take 1 tablet by mouth Daily., Disp: 30 tablet, Rfl: 3  •  Cholecalciferol (VITAMIN D PO), Take 1,000 Units by mouth Daily., Disp: , Rfl:   •  lisinopril (PRINIVIL,ZESTRIL) 10 MG tablet, Take 1 tablet by mouth Daily., Disp: 30 tablet, Rfl: 3  •  metoprolol succinate XL (TOPROL-XL) 25 MG 24 hr tablet, 1/2 tablet po qd, Disp: 30 tablet, Rfl: 3  •  Multiple Vitamins-Minerals (MULTIVITAMIN WOMEN PO), Take 1 tablet by mouth Daily., Disp: , Rfl:   •  naproxen (Naprosyn) 500 MG tablet, Take 1 tablet by mouth 2 (Two) Times a Day As Needed for Moderate Pain ., Disp: 30 tablet, Rfl: 0  •  nitroglycerin (NITROSTAT) 0.4 MG SL tablet, Place 1 tablet under the tongue Every 5 (Five) Minutes As Needed for Chest Pain. Take no more than 3 doses in 15 minutes., Disp: 25 tablet, Rfl: 1  •  omeprazole (PrilOSEC) 20 MG capsule, Take 1 capsule by mouth every night at bedtime., Disp: 30 capsule, Rfl: 2  •  vitamin B-12 (CYANOCOBALAMIN) 500 MCG tablet, Take 500 mcg by mouth Daily., Disp: , Rfl:   •  vitamin E 200 UNIT capsule, Take 200 Units by mouth Daily., Disp: , Rfl:   •  citalopram (CeleXA) 10 MG tablet, Take 1 tablet by mouth Daily., Disp: 30 tablet, Rfl: 3    Return in about 4 weeks (around 5/11/2021), or if symptoms worsen  or fail to improve, for Recheck bp with nurse 1 week, depression and BP recheck.     36 minutes in face to face time. With examination and chart review of previous studies and documentation.

## 2021-05-12 ENCOUNTER — LAB (OUTPATIENT)
Dept: LAB | Facility: HOSPITAL | Age: 56
End: 2021-05-12

## 2021-05-12 ENCOUNTER — OFFICE VISIT (OUTPATIENT)
Dept: INTERNAL MEDICINE | Facility: CLINIC | Age: 56
End: 2021-05-12

## 2021-05-12 VITALS
HEART RATE: 86 BPM | DIASTOLIC BLOOD PRESSURE: 85 MMHG | WEIGHT: 117 LBS | OXYGEN SATURATION: 96 % | TEMPERATURE: 97.7 F | HEIGHT: 66 IN | BODY MASS INDEX: 18.8 KG/M2 | SYSTOLIC BLOOD PRESSURE: 158 MMHG

## 2021-05-12 DIAGNOSIS — E78.49 OTHER HYPERLIPIDEMIA: Primary | ICD-10-CM

## 2021-05-12 DIAGNOSIS — E53.8 B12 DEFICIENCY: ICD-10-CM

## 2021-05-12 DIAGNOSIS — F33.42 RECURRENT MAJOR DEPRESSIVE DISORDER, IN FULL REMISSION (HCC): ICD-10-CM

## 2021-05-12 DIAGNOSIS — E78.49 OTHER HYPERLIPIDEMIA: ICD-10-CM

## 2021-05-12 DIAGNOSIS — E55.9 VITAMIN D DEFICIENCY: ICD-10-CM

## 2021-05-12 DIAGNOSIS — I10 ESSENTIAL HYPERTENSION: ICD-10-CM

## 2021-05-12 DIAGNOSIS — R53.83 OTHER FATIGUE: ICD-10-CM

## 2021-05-12 DIAGNOSIS — I73.9 PERIPHERAL VASCULAR DISEASE WITH CLAUDICATION (HCC): ICD-10-CM

## 2021-05-12 DIAGNOSIS — I10 ESSENTIAL HYPERTENSION: Primary | Chronic | ICD-10-CM

## 2021-05-12 DIAGNOSIS — K21.9 GASTROESOPHAGEAL REFLUX DISEASE: ICD-10-CM

## 2021-05-12 LAB
ALBUMIN SERPL-MCNC: 5 G/DL (ref 3.5–5.2)
ALBUMIN/GLOB SERPL: 1.8 G/DL
ALP SERPL-CCNC: 98 U/L (ref 39–117)
ALT SERPL W P-5'-P-CCNC: 15 U/L (ref 1–33)
ANION GAP SERPL CALCULATED.3IONS-SCNC: 13.3 MMOL/L (ref 5–15)
AST SERPL-CCNC: 22 U/L (ref 1–32)
BASOPHILS # BLD AUTO: 0.11 10*3/MM3 (ref 0–0.2)
BASOPHILS NFR BLD AUTO: 1.2 % (ref 0–1.5)
BILIRUB SERPL-MCNC: 0.3 MG/DL (ref 0–1.2)
BUN SERPL-MCNC: 15 MG/DL (ref 6–20)
BUN/CREAT SERPL: 22.7 (ref 7–25)
CALCIUM SPEC-SCNC: 10.2 MG/DL (ref 8.6–10.5)
CHLORIDE SERPL-SCNC: 100 MMOL/L (ref 98–107)
CHOLEST SERPL-MCNC: 246 MG/DL (ref 0–200)
CO2 SERPL-SCNC: 25.7 MMOL/L (ref 22–29)
CREAT SERPL-MCNC: 0.66 MG/DL (ref 0.57–1)
DEPRECATED RDW RBC AUTO: 45.5 FL (ref 37–54)
EOSINOPHIL # BLD AUTO: 0.26 10*3/MM3 (ref 0–0.4)
EOSINOPHIL NFR BLD AUTO: 2.8 % (ref 0.3–6.2)
ERYTHROCYTE [DISTWIDTH] IN BLOOD BY AUTOMATED COUNT: 12.4 % (ref 12.3–15.4)
GFR SERPL CREATININE-BSD FRML MDRD: 93 ML/MIN/1.73
GLOBULIN UR ELPH-MCNC: 2.8 GM/DL
GLUCOSE SERPL-MCNC: 76 MG/DL (ref 65–99)
HCT VFR BLD AUTO: 48 % (ref 34–46.6)
HDLC SERPL-MCNC: 116 MG/DL (ref 40–60)
HGB BLD-MCNC: 16.8 G/DL (ref 12–15.9)
IMM GRANULOCYTES # BLD AUTO: 0.05 10*3/MM3 (ref 0–0.05)
IMM GRANULOCYTES NFR BLD AUTO: 0.5 % (ref 0–0.5)
LDLC SERPL CALC-MCNC: 116 MG/DL (ref 0–100)
LDLC/HDLC SERPL: 0.97 {RATIO}
LYMPHOCYTES # BLD AUTO: 2.95 10*3/MM3 (ref 0.7–3.1)
LYMPHOCYTES NFR BLD AUTO: 31.3 % (ref 19.6–45.3)
MCH RBC QN AUTO: 34.6 PG (ref 26.6–33)
MCHC RBC AUTO-ENTMCNC: 35 G/DL (ref 31.5–35.7)
MCV RBC AUTO: 98.8 FL (ref 79–97)
MONOCYTES # BLD AUTO: 0.94 10*3/MM3 (ref 0.1–0.9)
MONOCYTES NFR BLD AUTO: 10 % (ref 5–12)
NEUTROPHILS NFR BLD AUTO: 5.11 10*3/MM3 (ref 1.7–7)
NEUTROPHILS NFR BLD AUTO: 54.2 % (ref 42.7–76)
NRBC BLD AUTO-RTO: 0 /100 WBC (ref 0–0.2)
PLATELET # BLD AUTO: 307 10*3/MM3 (ref 140–450)
PMV BLD AUTO: 9.7 FL (ref 6–12)
POTASSIUM SERPL-SCNC: 4.3 MMOL/L (ref 3.5–5.2)
PROT SERPL-MCNC: 7.8 G/DL (ref 6–8.5)
RBC # BLD AUTO: 4.86 10*6/MM3 (ref 3.77–5.28)
SODIUM SERPL-SCNC: 139 MMOL/L (ref 136–145)
TRIGL SERPL-MCNC: 85 MG/DL (ref 0–150)
VLDLC SERPL-MCNC: 14 MG/DL (ref 5–40)
WBC # BLD AUTO: 9.42 10*3/MM3 (ref 3.4–10.8)

## 2021-05-12 PROCEDURE — 80061 LIPID PANEL: CPT | Performed by: FAMILY MEDICINE

## 2021-05-12 PROCEDURE — 99214 OFFICE O/P EST MOD 30 MIN: CPT | Performed by: FAMILY MEDICINE

## 2021-05-12 PROCEDURE — 80050 GENERAL HEALTH PANEL: CPT | Performed by: FAMILY MEDICINE

## 2021-05-12 PROCEDURE — 82746 ASSAY OF FOLIC ACID SERUM: CPT | Performed by: FAMILY MEDICINE

## 2021-05-12 PROCEDURE — 82607 VITAMIN B-12: CPT | Performed by: FAMILY MEDICINE

## 2021-05-12 PROCEDURE — 82306 VITAMIN D 25 HYDROXY: CPT | Performed by: FAMILY MEDICINE

## 2021-05-12 PROCEDURE — 36415 COLL VENOUS BLD VENIPUNCTURE: CPT

## 2021-05-12 RX ORDER — LISINOPRIL 20 MG/1
20 TABLET ORAL DAILY
Qty: 30 TABLET | Refills: 2 | Status: SHIPPED | OUTPATIENT
Start: 2021-05-12 | End: 2022-02-28 | Stop reason: SDUPTHER

## 2021-05-12 RX ORDER — OMEPRAZOLE 20 MG/1
20 CAPSULE, DELAYED RELEASE ORAL
Qty: 30 CAPSULE | Refills: 2 | Status: SHIPPED | OUTPATIENT
Start: 2021-05-12 | End: 2022-04-07 | Stop reason: SDUPTHER

## 2021-05-12 RX ORDER — ATORVASTATIN CALCIUM 80 MG/1
80 TABLET, FILM COATED ORAL DAILY
Qty: 30 TABLET | Refills: 6 | Status: SHIPPED | OUTPATIENT
Start: 2021-05-12 | End: 2022-04-07 | Stop reason: SDUPTHER

## 2021-05-12 NOTE — PROGRESS NOTES
"Subjective   Sera Guidry is a 55 y.o. female.     Chief Complaint   Patient presents with   • Anxiety     f/u   • Depression     doing much better and feels pretty stable at this point   • Hypertension       Visit Vitals  /85 (BP Location: Left arm, Patient Position: Sitting, Cuff Size: Adult)   Pulse 86   Temp 97.7 °F (36.5 °C) (Infrared)   Ht 167.6 cm (66\")   Wt 53.1 kg (117 lb)   SpO2 96%   BMI 18.88 kg/m²       Vitals:    05/12/21 1349 05/12/21 1426   BP: (!) 186/106 158/85   BP Location: Left arm Left arm   Patient Position: Sitting Sitting   Cuff Size: Adult Adult   Pulse: 86    Temp: 97.7 °F (36.5 °C)    TempSrc: Infrared    SpO2: 96%    Weight: 53.1 kg (117 lb)    Height: 167.6 cm (66\")      Hypertension  This is a chronic problem. The current episode started more than 1 year ago. The problem has been gradually worsening since onset. The problem is uncontrolled. Associated symptoms include anxiety. Pertinent negatives include no blurred vision, chest pain, headaches, malaise/fatigue, neck pain, orthopnea, palpitations, peripheral edema, PND, shortness of breath or sweats. There are no associated agents to hypertension. Risk factors for coronary artery disease include dyslipidemia, family history, post-menopausal state and smoking/tobacco exposure. Current antihypertension treatment includes ACE inhibitors and beta blockers. The current treatment provides mild improvement. There are no compliance problems.  Hypertensive end-organ damage includes CAD/MI. There is no history of angina, kidney disease, CVA, heart failure, left ventricular hypertrophy, PVD or retinopathy. There is no history of chronic renal disease, sleep apnea or a thyroid problem.   Hyperlipidemia  This is a chronic problem. The current episode started more than 1 year ago. The problem is uncontrolled (pending). Recent lipid tests were reviewed and are high. She has no history of chronic renal disease, diabetes, hypothyroidism or " liver disease. Pertinent negatives include no chest pain, focal sensory loss, focal weakness, leg pain, myalgias or shortness of breath. Treatments tried: pt is not taking her atorvastatin. The current treatment provides mild improvement of lipids. Compliance problems include psychosocial issues.  Risk factors for coronary artery disease include dyslipidemia, family history, diabetes mellitus, hypertension and post-menopausal.   Depression  Visit Type: follow-up  Patient presents with the following symptoms: dry mouth.  Patient is not experiencing: anhedonia, chest pain, choking sensation, compulsions, confusion, decreased concentration, depressed mood, dizziness, excessive worry, fatigue, feelings of hopelessness, feelings of worthlessness, hypersomnia, hyperventilation, insomnia, irritability, malaise, memory impairment, muscle tension, nausea, nervousness/anxiety, obsessions, palpitations, panic, psychomotor agitation, psychomotor retardation, restlessness, shortness of breath, suicidal ideas, suicidal planning, thoughts of death, weight gain and weight loss.  Frequency of symptoms: rarely   Severity: mild   Sleep quality: good  Nighttime awakenings: occasional  Compliance with medications:  %        Anxiety  Symptoms include dry mouth. Patient reports no chest pain, compulsions, confusion, decreased concentration, depressed mood, excessive worry, hyperventilation, insomnia, irritability, malaise, muscle tension, nervous/anxious behavior, obsessions, palpitations, panic, restlessness, shortness of breath or suicidal ideas.     Her past medical history is significant for depression.      Pt states that she is short 7 people at work.  Pt had breakfast and no lunch and had a beer and cigarette when she got home.  Pt thinks that her anxiety and depression are better    PHQ-2 Depression Screening  Little interest or pleasure in doing things? 1   Feeling down, depressed, or hopeless? 0   PHQ-2 Total Score 1          The following portions of the patient's history were reviewed and updated as appropriate: allergies, current medications, past family history, past medical history, past social history, past surgical history and problem list.    Past Medical History:   Diagnosis Date   • Allergy    • Anxiety    • Asthma    • Cervical cancer (CMS/[facility])    • Chest pain    • COPD (chronic obstructive pulmonary disease) (CMS/[facility])    • Depression    • Endometriosis    • GERD (gastroesophageal reflux disease)    • H/O echocardiogram 04/17/2018    EF 60% mild TR   • Heart murmur    • Hypertension    • Osteoarthritis    • Other hyperlipidemia 11/27/2018   • Ovarian cancer (CMS/[facility])     endometrial/cervical   • Ovarian cyst    • Scoliosis       Past Surgical History:   Procedure Laterality Date   • APPENDECTOMY     • CARDIAC CATHETERIZATION N/A 4/1/2020    Procedure: Coronary angiography;  Surgeon: Chacorta Mohan MD;  Location: [facility] INVASIVE LOCATION;  Service: Cardiology;  Laterality: N/A;   • COLONOSCOPY  09/11/2018    Dr Patrick   • HYSTERECTOMY     • ORIF WRIST FRACTURE Left 09/2017    with 2 plates   • TOTAL ABDOMINAL HYSTERECTOMY WITH SALPINGO OOPHORECTOMY      ovaries removed at 2 separate occcasions      Family History   Problem Relation Age of Onset   • Allergies Other    • ADD / ADHD Other    • Heart disease Other         cardiac disorder   • Stroke Other    • Depression Other    • Hypertension Other    • Lung cancer Other    • Uterine cancer Other    • Ovarian cancer Other    • Cancer Mother         left lung   • Stroke Mother         x 2   • Stroke Father         x 3   • Heart disease Father 55        heart attack   • No Known Problems Son    • No Known Problems Son    • No Known Problems Son    • No Known Problems Brother       Social History     Socioeconomic History   • Marital status:      Spouse name: Not on file   • Number of children: 3   • Years of education: Not on file   • Highest education  level: Not on file   Tobacco Use   • Smoking status: Current Every Day Smoker     Packs/day: 0.50     Years: 40.00     Pack years: 20.00     Types: Cigarettes   • Smokeless tobacco: Never Used   Substance and Sexual Activity   • Alcohol use: Yes     Alcohol/week: 14.0 standard drinks     Types: 14 Cans of beer per week   • Drug use: No     Comment: quit MJ   • Sexual activity: Defer      Allergies   Allergen Reactions   • Penicillins Hives       Review of Systems   Constitutional: Negative for irritability, malaise/fatigue, weight gain and weight loss.   Eyes: Negative for blurred vision.   Respiratory: Negative for choking and shortness of breath.    Cardiovascular: Negative for chest pain, palpitations, orthopnea and PND.   Musculoskeletal: Negative for myalgias and neck pain.   Neurological: Negative for focal weakness and headaches.   Psychiatric/Behavioral: Negative for confusion, decreased concentration and suicidal ideas. The patient is not nervous/anxious and does not have insomnia.        Objective   Physical Exam  Vitals and nursing note reviewed.   Constitutional:       Appearance: She is well-developed.   HENT:      Head: Normocephalic.      Right Ear: External ear normal.      Left Ear: External ear normal.      Nose: Nose normal.   Eyes:      General: Lids are normal.      Conjunctiva/sclera: Conjunctivae normal.      Pupils: Pupils are equal, round, and reactive to light.   Neck:      Thyroid: No thyroid mass or thyromegaly.      Vascular: No carotid bruit.      Trachea: Trachea normal.   Cardiovascular:      Rate and Rhythm: Normal rate and regular rhythm.      Heart sounds: No murmur heard.     Pulmonary:      Effort: Pulmonary effort is normal. No respiratory distress.      Breath sounds: Normal breath sounds. No decreased breath sounds, wheezing, rhonchi or rales.   Chest:      Chest wall: No tenderness.   Abdominal:      General: Bowel sounds are normal.      Palpations: Abdomen is soft.       Tenderness: There is no abdominal tenderness.   Musculoskeletal:         General: Normal range of motion.      Cervical back: Normal range of motion and neck supple.   Skin:     General: Skin is warm and dry.   Neurological:      Mental Status: She is alert and oriented to person, place, and time.   Psychiatric:         Behavior: Behavior normal.         Assessment/Plan   Diagnoses and all orders for this visit:    1. Essential hypertension (Primary)  -     lisinopril (PRINIVIL,ZESTRIL) 20 MG tablet; Take 1 tablet by mouth Daily.  Dispense: 30 tablet; Refill: 2  -     Comprehensive Metabolic Panel; Future  -     Lipid Panel; Future    2. Gastroesophageal reflux disease  -     omeprazole (PrilOSEC) 20 MG capsule; Take 1 capsule by mouth every night at bedtime.  Dispense: 30 capsule; Refill: 2    3. Other hyperlipidemia  -     atorvastatin (LIPITOR) 80 MG tablet; Take 1 tablet by mouth Daily.  Dispense: 30 tablet; Refill: 6  -     Comprehensive Metabolic Panel; Future  -     Lipid Panel; Future    4. Peripheral vascular disease with claudication (CMS/HCC)  -     atorvastatin (LIPITOR) 80 MG tablet; Take 1 tablet by mouth Daily.  Dispense: 30 tablet; Refill: 6    5. Recurrent major depressive disorder, in full remission (CMS/HCC)      Restart lipitor.  Increase lisinopril to 20 mg per day  Continue the antidepressants,              Current Outpatient Medications:   •  albuterol sulfate  (90 Base) MCG/ACT inhaler, Inhale 2 puffs Every 4 (Four) Hours As Needed for Wheezing or Shortness of Air., Disp: 18 g, Rfl: 2  •  aspirin 81 MG EC tablet, Take 1 tablet by mouth Daily., Disp: 30 tablet, Rfl: 0  •  atorvastatin (LIPITOR) 80 MG tablet, Take 1 tablet by mouth Daily., Disp: 30 tablet, Rfl: 6  •  buPROPion XL (Wellbutrin XL) 150 MG 24 hr tablet, Take 1 tablet by mouth Daily., Disp: 30 tablet, Rfl: 3  •  busPIRone (BUSPAR) 15 MG tablet, Take 1 tablet by mouth Daily., Disp: 30 tablet, Rfl: 3  •  Cholecalciferol  (VITAMIN D PO), Take 1,000 Units by mouth Daily., Disp: , Rfl:   •  citalopram (CeleXA) 10 MG tablet, Take 1 tablet by mouth Daily., Disp: 30 tablet, Rfl: 3  •  metoprolol succinate XL (TOPROL-XL) 25 MG 24 hr tablet, 1/2 tablet po qd, Disp: 30 tablet, Rfl: 3  •  Multiple Vitamins-Minerals (MULTIVITAMIN WOMEN PO), Take 1 tablet by mouth Daily., Disp: , Rfl:   •  nitroglycerin (NITROSTAT) 0.4 MG SL tablet, Place 1 tablet under the tongue Every 5 (Five) Minutes As Needed for Chest Pain. Take no more than 3 doses in 15 minutes., Disp: 25 tablet, Rfl: 1  •  omeprazole (PrilOSEC) 20 MG capsule, Take 1 capsule by mouth every night at bedtime., Disp: 30 capsule, Rfl: 2  •  vitamin B-12 (CYANOCOBALAMIN) 500 MCG tablet, Take 500 mcg by mouth Daily., Disp: , Rfl:   •  vitamin E 200 UNIT capsule, Take 200 Units by mouth Daily., Disp: , Rfl:   •  lisinopril (PRINIVIL,ZESTRIL) 20 MG tablet, Take 1 tablet by mouth Daily., Disp: 30 tablet, Rfl: 2    Return in about 3 months (around 8/12/2021), or if symptoms worsen or fail to improve, for bp check with nurse 2 weeks, Annual.

## 2021-05-13 ENCOUNTER — HOSPITAL ENCOUNTER (OUTPATIENT)
Dept: CT IMAGING | Facility: HOSPITAL | Age: 56
Discharge: HOME | End: 2021-05-13
Admitting: FAMILY MEDICINE

## 2021-05-13 DIAGNOSIS — F17.210 CIGARETTE SMOKER: ICD-10-CM

## 2021-05-13 LAB
25(OH)D3 SERPL-MCNC: 24.1 NG/ML
FOLATE SERPL-MCNC: 8.11 NG/ML (ref 4.78–24.2)
TSH SERPL DL<=0.05 MIU/L-ACNC: 2.11 UIU/ML (ref 0.27–4.2)
VIT B12 BLD-MCNC: 480 PG/ML (ref 211–946)

## 2021-05-13 PROCEDURE — 71271 CT THORAX LUNG CANCER SCR C-: CPT

## 2021-05-20 ENCOUNTER — APPOINTMENT (OUTPATIENT)
Dept: CT IMAGING | Facility: HOSPITAL | Age: 56
End: 2021-05-20

## 2021-08-25 ENCOUNTER — OFFICE VISIT (OUTPATIENT)
Dept: INTERNAL MEDICINE | Facility: CLINIC | Age: 56
End: 2021-08-25

## 2021-08-25 VITALS
BODY MASS INDEX: 18.64 KG/M2 | HEIGHT: 66 IN | SYSTOLIC BLOOD PRESSURE: 138 MMHG | OXYGEN SATURATION: 91 % | HEART RATE: 70 BPM | DIASTOLIC BLOOD PRESSURE: 78 MMHG | TEMPERATURE: 97.7 F | RESPIRATION RATE: 18 BRPM | WEIGHT: 116 LBS

## 2021-08-25 DIAGNOSIS — Z12.31 ENCOUNTER FOR SCREENING MAMMOGRAM FOR MALIGNANT NEOPLASM OF BREAST: ICD-10-CM

## 2021-08-25 DIAGNOSIS — Z12.11 ENCOUNTER FOR SCREENING FECAL OCCULT BLOOD TESTING: ICD-10-CM

## 2021-08-25 DIAGNOSIS — G89.29 CHRONIC RIGHT SHOULDER PAIN: ICD-10-CM

## 2021-08-25 DIAGNOSIS — L98.9 SKIN LESIONS: ICD-10-CM

## 2021-08-25 DIAGNOSIS — Z00.00 PHYSICAL EXAM: Primary | ICD-10-CM

## 2021-08-25 DIAGNOSIS — M25.511 CHRONIC RIGHT SHOULDER PAIN: ICD-10-CM

## 2021-08-25 DIAGNOSIS — Z20.818 EXPOSURE TO STREP THROAT: ICD-10-CM

## 2021-08-25 LAB
BILIRUB BLD-MCNC: NEGATIVE MG/DL
CLARITY, POC: CLEAR
COLOR UR: YELLOW
EXPIRATION DATE: NORMAL
GLUCOSE UR STRIP-MCNC: NEGATIVE MG/DL
INTERNAL CONTROL: NORMAL
KETONES UR QL: NEGATIVE
LEUKOCYTE EST, POC: NEGATIVE
Lab: NORMAL
NITRITE UR-MCNC: NEGATIVE MG/ML
PH UR: 6 [PH] (ref 5–8)
PROT UR STRIP-MCNC: NEGATIVE MG/DL
RBC # UR STRIP: NEGATIVE /UL
S PYO AG THROAT QL: NEGATIVE
SP GR UR: 1.01 (ref 1–1.03)
UROBILINOGEN UR QL: NORMAL

## 2021-08-25 PROCEDURE — 87880 STREP A ASSAY W/OPTIC: CPT | Performed by: FAMILY MEDICINE

## 2021-08-25 PROCEDURE — 81003 URINALYSIS AUTO W/O SCOPE: CPT | Performed by: FAMILY MEDICINE

## 2021-08-25 PROCEDURE — 99396 PREV VISIT EST AGE 40-64: CPT | Performed by: FAMILY MEDICINE

## 2021-08-25 NOTE — PROGRESS NOTES
"Subjective   Sera Guidry is a 55 y.o. female.     Chief Complaint   Patient presents with   • Annual Exam       Visit Vitals  /78   Pulse 70   Temp 97.7 °F (36.5 °C)   Resp 18   Ht 167.6 cm (66\")   Wt 52.6 kg (116 lb)   SpO2 91%   BMI 18.72 kg/m²         History of Present Illness   Pt here for annual exam  Pt's boss left work with strep and pt has has mild irritated throat.  Pt has had her covid vaccine.     The following portions of the patient's history were reviewed and updated as appropriate: allergies, current medications, past family history, past medical history, past social history, past surgical history and problem list.    Past Medical History:   Diagnosis Date   • Allergy    • Anxiety    • Asthma    • Cervical cancer (CMS/HCC)    • Chest pain    • COPD (chronic obstructive pulmonary disease) (CMS/HCC)    • Depression    • Endometriosis    • GERD (gastroesophageal reflux disease)    • H/O echocardiogram 04/17/2018    EF 60% mild TR   • Heart murmur    • Hypertension    • Osteoarthritis    • Other hyperlipidemia 11/27/2018   • Ovarian cancer (CMS/[facility])     endometrial/cervical   • Ovarian cyst    • Scoliosis       Past Surgical History:   Procedure Laterality Date   • APPENDECTOMY     • CARDIAC CATHETERIZATION N/A 4/1/2020    Procedure: Coronary angiography;  Surgeon: Chacorta Mohan MD;  Location: [facility] INVASIVE LOCATION;  Service: Cardiology;  Laterality: N/A;   • COLONOSCOPY  09/11/2018    Dr Patrick   • HYSTERECTOMY     • ORIF WRIST FRACTURE Left 09/2017    with 2 plates   • TOTAL ABDOMINAL HYSTERECTOMY WITH SALPINGO OOPHORECTOMY      ovaries removed at 2 separate occcasions      Family History   Problem Relation Age of Onset   • Allergies Other    • ADD / ADHD Other    • Heart disease Other         cardiac disorder   • Stroke Other    • Depression Other    • Hypertension Other    • Lung cancer Other    • Uterine cancer Other    • Ovarian cancer Other    • Cancer Mother         " left lung   • Stroke Mother         x 2   • Stroke Father         x 3   • Heart disease Father 55        heart attack   • No Known Problems Son    • No Known Problems Son    • No Known Problems Son    • No Known Problems Brother       Social History     Socioeconomic History   • Marital status:      Spouse name: Not on file   • Number of children: 3   • Years of education: Not on file   • Highest education level: Not on file   Tobacco Use   • Smoking status: Current Every Day Smoker     Packs/day: 0.50     Years: 40.00     Pack years: 20.00     Types: Cigarettes   • Smokeless tobacco: Never Used   Substance and Sexual Activity   • Alcohol use: Yes     Alcohol/week: 14.0 standard drinks     Types: 14 Cans of beer per week   • Drug use: No     Comment: quit MJ   • Sexual activity: Defer      Allergies   Allergen Reactions   • Penicillins Hives       Review of Systems   Constitutional: Negative.  Negative for activity change, appetite change, chills, diaphoresis, fatigue, fever and unexpected weight change.   HENT: Positive for congestion (in early am). Negative for dental problem, drooling, ear discharge, ear pain, facial swelling, hearing loss, mouth sores, nosebleeds, postnasal drip, rhinorrhea, sinus pressure, sinus pain, sneezing, sore throat, tinnitus, trouble swallowing and voice change.    Eyes: Negative.  Negative for photophobia, pain, discharge, redness, itching and visual disturbance.   Respiratory: Positive for cough (at night, pt is still smoking, but weaning cigarettes). Negative for apnea, choking, chest tightness, shortness of breath, wheezing and stridor.    Cardiovascular: Negative.  Negative for chest pain, palpitations and leg swelling.   Gastrointestinal: Negative.  Negative for abdominal distention, abdominal pain, anal bleeding, blood in stool, constipation, diarrhea, nausea, rectal pain and vomiting.   Endocrine: Positive for cold intolerance and heat intolerance. Negative for  polydipsia, polyphagia and polyuria.   Genitourinary: Negative.  Negative for decreased urine volume, difficulty urinating, dyspareunia, dysuria, enuresis, flank pain, frequency, genital sores, hematuria, menstrual problem, pelvic pain, urgency, vaginal bleeding, vaginal discharge and vaginal pain.   Musculoskeletal: Positive for arthralgias (right shoulder pain,), back pain, joint swelling, myalgias, neck pain and neck stiffness. Negative for gait problem.   Skin: Negative.  Negative for color change, pallor, rash and wound.        Scalp lesion   Allergic/Immunologic: Positive for environmental allergies. Negative for food allergies and immunocompromised state.   Neurological: Negative.  Negative for dizziness, tremors, seizures, syncope, facial asymmetry, speech difficulty, weakness, light-headedness, numbness and headaches.   Hematological: Negative.  Negative for adenopathy. Does not bruise/bleed easily.   Psychiatric/Behavioral: Negative.  Negative for agitation, behavioral problems, confusion, decreased concentration, dysphoric mood, hallucinations, self-injury, sleep disturbance and suicidal ideas. The patient is not nervous/anxious and is not hyperactive.        Objective   Physical Exam  Vitals and nursing note reviewed.   Constitutional:       General: She is not in acute distress.     Appearance: She is well-developed. She is not diaphoretic.   HENT:      Head: Normocephalic and atraumatic.      Right Ear: Tympanic membrane, ear canal and external ear normal.      Left Ear: Tympanic membrane, ear canal and external ear normal.      Nose: Nose normal.      Mouth/Throat:      Lips: Pink.      Mouth: Mucous membranes are moist. Mucous membranes are not pale, not dry and not cyanotic.      Dentition: Normal dentition.      Tongue: No lesions.      Palate: No mass.      Pharynx: Uvula midline. Posterior oropharyngeal erythema (slight) present. No pharyngeal swelling, oropharyngeal exudate or uvula swelling.       Tonsils: No tonsillar exudate.   Eyes:      General: Lids are normal. No scleral icterus.        Right eye: No foreign body, discharge or hordeolum.         Left eye: No foreign body, discharge or hordeolum.      Extraocular Movements:      Right eye: Normal extraocular motion and no nystagmus.      Left eye: Normal extraocular motion and no nystagmus.      Conjunctiva/sclera: Conjunctivae normal.      Right eye: Right conjunctiva is not injected. No chemosis, exudate or hemorrhage.     Left eye: Left conjunctiva is not injected. No chemosis, exudate or hemorrhage.     Pupils: Pupils are equal, round, and reactive to light.   Neck:      Thyroid: No thyroid mass or thyromegaly.      Vascular: No carotid bruit.      Trachea: Trachea normal. No tracheal deviation.   Cardiovascular:      Rate and Rhythm: Normal rate and regular rhythm.      Pulses:           Dorsalis pedis pulses are 2+ on the right side and 2+ on the left side.        Posterior tibial pulses are 2+ on the right side and 2+ on the left side.      Heart sounds: Normal heart sounds. No murmur heard.   No friction rub. No gallop.    Pulmonary:      Effort: Pulmonary effort is normal. No respiratory distress.      Breath sounds: Normal breath sounds. No decreased breath sounds, wheezing, rhonchi or rales.   Chest:      Chest wall: No tenderness. There is no dullness to percussion.      Breasts: Ricardo Score is 5. Breasts are symmetrical.         Right: No swelling, bleeding, inverted nipple, mass, nipple discharge, skin change or tenderness.         Left: No swelling, bleeding, inverted nipple, mass, nipple discharge, skin change or tenderness.   Abdominal:      General: Bowel sounds are normal. There is no distension.      Palpations: Abdomen is soft. There is no hepatomegaly, splenomegaly or mass.      Tenderness: There is no abdominal tenderness. There is no guarding or rebound.      Hernia: No hernia is present.   Musculoskeletal:      Right shoulder:  Bony tenderness present. No swelling or deformity. Decreased range of motion.      Cervical back: Normal range of motion and neck supple.      Thoracic back: Deformity, spasms, tenderness and bony tenderness present.      Lumbar back: Deformity, spasms and tenderness present.      Comments: Scoliosis of back   Lymphadenopathy:      Head:      Right side of head: No submandibular adenopathy.      Left side of head: No submandibular adenopathy.      Cervical: No cervical adenopathy.      Right cervical: No superficial, deep or posterior cervical adenopathy.     Left cervical: No superficial, deep or posterior cervical adenopathy.      Upper Body:      Right upper body: No supraclavicular, axillary or pectoral adenopathy.      Left upper body: No supraclavicular, axillary or pectoral adenopathy.   Skin:     General: Skin is warm and dry.      Findings: Lesion present. No rash.      Nails: There is no clubbing.             Comments: Horny lesion ant neck and vertex scalp   Neurological:      Mental Status: She is alert and oriented to person, place, and time.      Cranial Nerves: No cranial nerve deficit.      Sensory: No sensory deficit.      Coordination: Coordination normal.      Deep Tendon Reflexes: Reflexes are normal and symmetric.      Reflex Scores:       Bicep reflexes are 2+ on the right side and 2+ on the left side.       Brachioradialis reflexes are 2+ on the right side and 2+ on the left side.       Patellar reflexes are 2+ on the right side and 2+ on the left side.  Psychiatric:         Attention and Perception: Attention normal.         Mood and Affect: Mood and affect normal.         Speech: Speech normal.         Behavior: Behavior normal.         Thought Content: Thought content normal.         Cognition and Memory: Cognition and memory normal.         Judgment: Judgment normal.         Assessment/Plan   Diagnoses and all orders for this visit:    1. Physical exam (Primary)  -     POCT urinalysis  dipstick, automated    2. Exposure to strep throat  -     POCT rapid strep A    3. Encounter for screening mammogram for malignant neoplasm of breast  -     Mammo Screening Digital Tomosynthesis Bilateral With CAD; Future    4. Encounter for screening fecal occult blood testing  -     POC Occult Blood X 3, Stool; Future    5. Chronic right shoulder pain  -     Ambulatory Referral to Orthopedic Surgery    6. Skin lesions  -     Ambulatory Referral to Dermatology        Please follow a low animal fat diet that is also low in sugar, low in junk food, low in sweet drinks and low in alcohol.  Please increase the amount of fiber in your diet as well as increasing your daily exercise, such as walking.    Pt is working at [employer]  Discussed Shingrix vaccine and tdap with pt,  that are currently available at the pharmacy.    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          Current Outpatient Medications:   •  albuterol sulfate  (90 Base) MCG/ACT inhaler, Inhale 2 puffs Every 4 (Four) Hours As Needed for Wheezing or Shortness of Air., Disp: 18 g, Rfl: 2  •  aspirin 81 MG EC tablet, Take 1 tablet by mouth Daily., Disp: 30 tablet, Rfl: 0  •  atorvastatin (LIPITOR) 80 MG tablet, Take 1 tablet by mouth Daily., Disp: 30 tablet, Rfl: 6  •  buPROPion XL (Wellbutrin XL) 150 MG 24 hr tablet, Take 1 tablet by mouth Daily., Disp: 30 tablet, Rfl: 3  •  busPIRone (BUSPAR) 15 MG tablet, Take 1 tablet by mouth Daily., Disp: 30 tablet, Rfl: 3  •  Cholecalciferol (VITAMIN D PO), Take 1,000 Units by mouth Daily., Disp: , Rfl:   •  citalopram (CeleXA) 10 MG tablet, Take 1 tablet by mouth Daily., Disp: 30 tablet, Rfl: 3  •  lisinopril (PRINIVIL,ZESTRIL) 20 MG tablet, Take 1 tablet by mouth Daily., Disp: 30 tablet, Rfl: 2  •  metoprolol succinate XL (TOPROL-XL) 25 MG 24 hr tablet, 1/2 tablet po qd, Disp: 30 tablet, Rfl: 3  •   Multiple Vitamins-Minerals (MULTIVITAMIN WOMEN PO), Take 1 tablet by mouth Daily., Disp: , Rfl:   •  nitroglycerin (NITROSTAT) 0.4 MG SL tablet, Place 1 tablet under the tongue Every 5 (Five) Minutes As Needed for Chest Pain. Take no more than 3 doses in 15 minutes., Disp: 25 tablet, Rfl: 1  •  omeprazole (PrilOSEC) 20 MG capsule, Take 1 capsule by mouth every night at bedtime., Disp: 30 capsule, Rfl: 2  •  vitamin E 200 UNIT capsule, Take 200 Units by mouth Daily., Disp: , Rfl:   •  vitamin B-12 (CYANOCOBALAMIN) 500 MCG tablet, Take 500 mcg by mouth Daily., Disp: , Rfl:     Return in about 6 months (around 2/25/2022), or if symptoms worsen or fail to improve, for Recheck htn, hl.     Recent Results (from the past 168 hour(s))   POCT urinalysis dipstick, automated    Collection Time: 08/25/21  2:35 PM    Specimen: Urine   Result Value Ref Range    Color Yellow Yellow, Straw, Dark Yellow, Giulia    Clarity, UA Clear Clear    Specific Gravity  1.010 1.005 - 1.030    pH, Urine 6.0 5.0 - 8.0    Leukocytes Negative Negative    Nitrite, UA Negative Negative    Protein, POC Negative Negative mg/dL    Glucose, UA Negative Negative, 1000 mg/dL (3+) mg/dL    Ketones, UA Negative Negative    Urobilinogen, UA Normal Normal    Bilirubin Negative Negative    Blood, UA Negative Negative   POCT rapid strep A    Collection Time: 08/25/21  3:47 PM    Specimen: Swab   Result Value Ref Range    Rapid Strep A Screen Negative Negative, VALID, INVALID, Not Performed    Internal Control Passed Passed    Lot Number [id number]     Expiration Date 04-

## 2021-09-07 ENCOUNTER — OFFICE VISIT (OUTPATIENT)
Dept: ORTHOPEDIC SURGERY | Facility: CLINIC | Age: 56
End: 2021-09-07

## 2021-09-07 ENCOUNTER — TELEPHONE (OUTPATIENT)
Dept: INTERNAL MEDICINE | Facility: CLINIC | Age: 56
End: 2021-09-07

## 2021-09-07 VITALS
HEART RATE: 74 BPM | WEIGHT: 115.96 LBS | HEIGHT: 66 IN | BODY MASS INDEX: 18.64 KG/M2 | SYSTOLIC BLOOD PRESSURE: 210 MMHG | DIASTOLIC BLOOD PRESSURE: 103 MMHG

## 2021-09-07 DIAGNOSIS — M25.511 RIGHT SHOULDER PAIN, UNSPECIFIED CHRONICITY: ICD-10-CM

## 2021-09-07 DIAGNOSIS — M75.51 BURSITIS OF RIGHT SHOULDER: ICD-10-CM

## 2021-09-07 DIAGNOSIS — S46.001A ROTATOR CUFF INJURY, RIGHT, INITIAL ENCOUNTER: Primary | ICD-10-CM

## 2021-09-07 DIAGNOSIS — M75.41 IMPINGEMENT SYNDROME OF RIGHT SHOULDER: ICD-10-CM

## 2021-09-07 DIAGNOSIS — M75.21 BICEPS TENDINITIS OF RIGHT UPPER EXTREMITY: ICD-10-CM

## 2021-09-07 DIAGNOSIS — G25.89 SCAPULAR DYSKINESIS: ICD-10-CM

## 2021-09-07 PROCEDURE — 99204 OFFICE O/P NEW MOD 45 MIN: CPT | Performed by: ORTHOPAEDIC SURGERY

## 2021-09-07 NOTE — TELEPHONE ENCOUNTER
This check with patient regarding her blood pressure.  She was going to recheck it at home.  It was very high at Ortho.

## 2021-09-07 NOTE — PROGRESS NOTES
[facility] Orthopaedic Surgery Office Visit - Crispin Hylton MD    Office Visit       Patient Name: Sera Guidry    Chief Complaint:   Chief Complaint   Patient presents with   • Right Shoulder - Initial Evaluation     X 1yr gradual worsening       Referring Physician: Pooja Perdomo MD    History of Present Illness:   Sera Guidry is a 55 y.o. female who presents with right body part: shoulder Reason: pain.  Onset:Onset: atraumatic and gradual in nature. The issue has been ongoing for 1 year(s). Pain is a 9/10 on the pain scale. Pain is described as Pain Characterization: dull, aching, burning, stabbing and shooting. Associated symptoms include Symptoms: popping, stiffness and giving way/buckling. The pain is worse with standing, working and lying on affected side; nothing improves the pain. Previous treatments have included: NSAIDS and Voltaren Gel. I have reviewed the patient's history of present illness as noted/entered above.    I have reviewed the patient's past medical history, surgical history, social history, family history, medications, and allergies as noted in the electronic medical record and as noted/entered.  I have reviewed the patient's review of systems as noted/enter and updated as noted in the patient's HPI.      RIGHT SHOULDER chronic pain  HTN - counseled    [place] x7 years, prior from [facility]    Severe scoliosis      Subjective   Subjective      Review of Systems   Constitutional: Positive for activity change, appetite change, chills, fatigue and fever.   HENT: Positive for congestion, dental problem, drooling, sinus pressure, sneezing and sore throat.    Eyes: Positive for itching.   Respiratory: Positive for choking, chest tightness, shortness of breath and wheezing.    Cardiovascular: Positive for chest pain, palpitations and leg swelling.   Endocrine: Positive for cold intolerance and heat  intolerance.   Musculoskeletal: Positive for back pain, joint swelling, neck pain and neck stiffness.   Neurological: Positive for dizziness, weakness, light-headedness, numbness and headache.   Hematological: Bruises/bleeds easily.   Psychiatric/Behavioral: Positive for agitation, behavioral problems, decreased concentration, dysphoric mood and sleep disturbance. The patient is nervous/anxious.         Past Medical History:   Past Medical History:   Diagnosis Date   • Allergy    • Anxiety    • Asthma    • Cervical cancer (CMS/HCC)    • Chest pain    • COPD (chronic obstructive pulmonary disease) (CMS/HCC)    • Depression    • Endometriosis    • GERD (gastroesophageal reflux disease)    • H/O echocardiogram 04/17/2018    EF 60% mild TR   • Heart murmur    • Hypertension    • Osteoarthritis    • Other hyperlipidemia 11/27/2018   • Ovarian cancer (CMS/[facility])     endometrial/cervical   • Ovarian cyst    • Scoliosis        Past Surgical History:   Past Surgical History:   Procedure Laterality Date   • APPENDECTOMY     • CARDIAC CATHETERIZATION N/A 4/1/2020    Procedure: Coronary angiography;  Surgeon: Chacorta Mohan MD;  Location: [facility] INVASIVE LOCATION;  Service: Cardiology;  Laterality: N/A;   • COLONOSCOPY  09/11/2018    Dr Patrick   • HYSTERECTOMY     • ORIF WRIST FRACTURE Left 09/2017    with 2 plates   • TOTAL ABDOMINAL HYSTERECTOMY WITH SALPINGO OOPHORECTOMY      ovaries removed at 2 separate occcasions       Family History:   Family History   Problem Relation Age of Onset   • Allergies Other    • ADD / ADHD Other    • Heart disease Other         cardiac disorder   • Stroke Other    • Depression Other    • Hypertension Other    • Lung cancer Other    • Uterine cancer Other    • Ovarian cancer Other    • Cancer Mother         left lung   • Stroke Mother         x 2   • Stroke Father         x 3   • Heart disease Father 55        heart attack   • No Known Problems Son    • No Known Problems Son    • No  Known Problems Son    • No Known Problems Brother        Social History:   Social History     Socioeconomic History   • Marital status:      Spouse name: Not on file   • Number of children: 3   • Years of education: Not on file   • Highest education level: Not on file   Tobacco Use   • Smoking status: Current Every Day Smoker     Packs/day: 0.50     Years: 40.00     Pack years: 20.00     Types: Cigarettes   • Smokeless tobacco: Never Used   Substance and Sexual Activity   • Alcohol use: Yes     Alcohol/week: 14.0 standard drinks     Types: 14 Cans of beer per week   • Drug use: No     Comment: quit MJ   • Sexual activity: Defer       Medications:   Current Outpatient Medications:   •  albuterol sulfate  (90 Base) MCG/ACT inhaler, Inhale 2 puffs Every 4 (Four) Hours As Needed for Wheezing or Shortness of Air., Disp: 18 g, Rfl: 2  •  aspirin 81 MG EC tablet, Take 1 tablet by mouth Daily., Disp: 30 tablet, Rfl: 0  •  atorvastatin (LIPITOR) 80 MG tablet, Take 1 tablet by mouth Daily., Disp: 30 tablet, Rfl: 6  •  buPROPion XL (Wellbutrin XL) 150 MG 24 hr tablet, Take 1 tablet by mouth Daily., Disp: 30 tablet, Rfl: 3  •  busPIRone (BUSPAR) 15 MG tablet, Take 1 tablet by mouth Daily., Disp: 30 tablet, Rfl: 3  •  Cholecalciferol (VITAMIN D PO), Take 1,000 Units by mouth Daily., Disp: , Rfl:   •  citalopram (CeleXA) 10 MG tablet, Take 1 tablet by mouth Daily., Disp: 30 tablet, Rfl: 3  •  lisinopril (PRINIVIL,ZESTRIL) 20 MG tablet, Take 1 tablet by mouth Daily., Disp: 30 tablet, Rfl: 2  •  metoprolol succinate XL (TOPROL-XL) 25 MG 24 hr tablet, 1/2 tablet po qd, Disp: 30 tablet, Rfl: 3  •  Multiple Vitamins-Minerals (MULTIVITAMIN WOMEN PO), Take 1 tablet by mouth Daily., Disp: , Rfl:   •  nitroglycerin (NITROSTAT) 0.4 MG SL tablet, Place 1 tablet under the tongue Every 5 (Five) Minutes As Needed for Chest Pain. Take no more than 3 doses in 15 minutes., Disp: 25 tablet, Rfl: 1  •  omeprazole (PrilOSEC) 20 MG  "capsule, Take 1 capsule by mouth every night at bedtime., Disp: 30 capsule, Rfl: 2  •  vitamin B-12 (CYANOCOBALAMIN) 500 MCG tablet, Take 500 mcg by mouth Daily., Disp: , Rfl:   •  vitamin E 200 UNIT capsule, Take 200 Units by mouth Daily., Disp: , Rfl:     Allergies:   Allergies   Allergen Reactions   • Penicillins Hives       The following portions of the patient's history were reviewed and updated as appropriate: allergies, current medications, past family history, past medical history, past social history, past surgical history and problem list.        Objective    Objective      Vital Signs:   Vitals:    09/07/21 0842   BP: (!) 210/103   Pulse: 74   Weight: 52.6 kg (115 lb 15.4 oz)   Height: 167.6 cm (65.98\")       Ortho Exam:  Counseled on her hypertension  Severe scoliosis very prominent right scapula and impingement syndrome resultant from scapular dyskinesis/scapular protraction  Pain and weakness with rotator cuff testing but this may be limited due to poor scapular posture anterior biceps pain  , Pain with Jobes testing, pain with Murdo's, pain with upper cut  Impingement syndrome with Neer and Mata testing    Results Review:   Imaging Results (Last 24 Hours)     Procedure Component Value Units Date/Time    XR Shoulder 2+ View Right [[id number]] Resulted: 09/07/21 0858     Updated: 09/07/21 0858    Narrative:      Imaging: shoulder x-rays 2 views - AP and axillary x-ray views    Side: RIGHT    Indication for shoulder x-ray 2 views: shoulder pain    Comparison: no comparison views available    Findings: No acute bony pathology. No superior humeral head migration.    The humeral head remains centered in the glenohumeral joint. No evidence   of calcific tendonitis.    AC joint arthritic changes but asymptomatic on clinical exam    I personally reviewed the above x-rays and discussed with the patient.          Right shoulder AC joint arthritis on x-ray but asymptomatic on exam    Procedures           "   Assessment / Plan      Assessment/Plan:   Problem List Items Addressed This Visit        Musculoskeletal and Injuries    Bursitis of right shoulder    Relevant Orders    MRI Shoulder Right Without Contrast    Impingement syndrome of right shoulder    Relevant Orders    MRI Shoulder Right Without Contrast    Biceps tendinitis of right upper extremity    Relevant Orders    MRI Shoulder Right Without Contrast       Neuro    Scapular dyskinesis    Relevant Orders    MRI Shoulder Right Without Contrast       Other    Rotator cuff injury, right, initial encounter - Primary    Relevant Orders    MRI Shoulder Right Without Contrast      Other Visit Diagnoses     Right shoulder pain, unspecified chronicity        Relevant Orders    XR Shoulder 2+ View Right (Completed)          Right shoulder  MRI of the shoulder is recommended.  Indication: suspected rotator cuff tear  The MRI is critical to evaluate for rotator cuff tearing and will help with possible surgical planning.    Provided a home scapular program.  Hopeful that the findings are primarily due to her scoliosis and scapular protraction and that no obvious rotator cuff tear will be found.  The MRI will confirm the findings and then we will further evaluate whether an injection and therapy will be the way to go.    Hypertension-she will check her blood pressure at home when checking with her primary care team-counseled    Follow Up: After right shoulder MRI        Crispin Hylton MD, FAAOS  Orthopedic Surgeon  Fellowship Trained Shoulder and Elbow Surgeon  [facility]  Orthopedics and Sports Medicine  [address] [place] [address], [address]  [place] [address]    09/07/21  08:59 EDT    Please note that portions of this note may have been completed with a voice recognition program. Efforts were made to edit the dictations, but occasionally words are mistranscribed.

## 2021-09-08 NOTE — TELEPHONE ENCOUNTER
Patient called back to report BP readings.  Patient said at 1:56 her BP was 153/97, HR 68 and then she took it again at 3:08 and it was 120/79, HR 52.  Patient asked why we were concerned and I informed her that we noticed her BP was really high while at her Ortho appt.  Patient stated she is feeling fine.

## 2021-09-08 NOTE — TELEPHONE ENCOUNTER
I called pt to see what her BP has been running.  She has not checked it.  She will check it a few times and call back to let us know.

## 2021-09-24 ENCOUNTER — TELEPHONE (OUTPATIENT)
Dept: INTERNAL MEDICINE | Facility: CLINIC | Age: 56
End: 2021-09-24

## 2021-09-24 NOTE — TELEPHONE ENCOUNTER
Called and spoke to patient she states that she has a spot on her throat and she has called several dermatology offices and they can't get her in for an appointment due to her insurance. When she showed Dr. Perdomo at her last visit she said it looked like carcinoma. She states that this is a new spot that has appeared and she popped it because she thought it was a pimple. She states that she is willing to wait until Dr. Perdomo returns for a referral but is concerned about possibly getting in somewhere due to her insurance. Informed her that I would reach out to our referral coordinator when she returned on Monday. She was agreeable to wait until Monday for a return call.

## 2021-09-24 NOTE — TELEPHONE ENCOUNTER
Caller: Sera Guidry    Relationship: Self    Best call back number: [phone]    What is the best time to reach you: ANYTIME AFTER 1PM ON 9/24/2021    Who are you requesting to speak with (clinical staff, provider,  specific staff member): CLINICAL     What was the call regarding: PATIENT HAS SOMETHING ON HER THROAT AND ON TOP OF HER HEAD. SHE HAS CALLED TO EVERY DERMATOLOGIST AND KNOW TAKES HER INSURANCE.     Do you require a callback: YES

## 2021-09-26 ENCOUNTER — HOSPITAL ENCOUNTER (OUTPATIENT)
Dept: MRI IMAGING | Facility: HOSPITAL | Age: 56
Discharge: HOME | End: 2021-09-26
Admitting: ORTHOPAEDIC SURGERY

## 2021-09-26 DIAGNOSIS — M75.51 BURSITIS OF RIGHT SHOULDER: ICD-10-CM

## 2021-09-26 DIAGNOSIS — G25.89 SCAPULAR DYSKINESIS: ICD-10-CM

## 2021-09-26 DIAGNOSIS — S46.001A ROTATOR CUFF INJURY, RIGHT, INITIAL ENCOUNTER: ICD-10-CM

## 2021-09-26 DIAGNOSIS — M75.41 IMPINGEMENT SYNDROME OF RIGHT SHOULDER: ICD-10-CM

## 2021-09-26 DIAGNOSIS — M75.21 BICEPS TENDINITIS OF RIGHT UPPER EXTREMITY: ICD-10-CM

## 2021-09-26 PROCEDURE — 73221 MRI JOINT UPR EXTREM W/O DYE: CPT

## 2021-09-28 ENCOUNTER — OFFICE VISIT (OUTPATIENT)
Dept: ORTHOPEDIC SURGERY | Facility: CLINIC | Age: 56
End: 2021-09-28

## 2021-09-28 VITALS
HEART RATE: 75 BPM | WEIGHT: 119.93 LBS | BODY MASS INDEX: 19.27 KG/M2 | DIASTOLIC BLOOD PRESSURE: 81 MMHG | SYSTOLIC BLOOD PRESSURE: 161 MMHG | HEIGHT: 66 IN

## 2021-09-28 DIAGNOSIS — M75.41 IMPINGEMENT SYNDROME OF RIGHT SHOULDER: ICD-10-CM

## 2021-09-28 DIAGNOSIS — S46.001A ROTATOR CUFF INJURY, RIGHT, INITIAL ENCOUNTER: Primary | ICD-10-CM

## 2021-09-28 DIAGNOSIS — M75.111 NONTRAUMATIC INCOMPLETE TEAR OF RIGHT ROTATOR CUFF: ICD-10-CM

## 2021-09-28 DIAGNOSIS — M75.51 BURSITIS OF RIGHT SHOULDER: ICD-10-CM

## 2021-09-28 DIAGNOSIS — G25.89 SCAPULAR DYSKINESIS: ICD-10-CM

## 2021-09-28 DIAGNOSIS — M75.21 BICEPS TENDINITIS OF RIGHT UPPER EXTREMITY: ICD-10-CM

## 2021-09-28 PROCEDURE — 99213 OFFICE O/P EST LOW 20 MIN: CPT | Performed by: ORTHOPAEDIC SURGERY

## 2021-09-28 PROCEDURE — 20610 DRAIN/INJ JOINT/BURSA W/O US: CPT | Performed by: ORTHOPAEDIC SURGERY

## 2021-09-28 RX ORDER — LIDOCAINE HYDROCHLORIDE 10 MG/ML
5 INJECTION, SOLUTION EPIDURAL; INFILTRATION; INTRACAUDAL; PERINEURAL
Status: COMPLETED | OUTPATIENT
Start: 2021-09-28 | End: 2021-09-28

## 2021-09-28 RX ORDER — METHYLPREDNISOLONE ACETATE 80 MG/ML
80 INJECTION, SUSPENSION INTRA-ARTICULAR; INTRALESIONAL; INTRAMUSCULAR; SOFT TISSUE
Status: COMPLETED | OUTPATIENT
Start: 2021-09-28 | End: 2021-09-28

## 2021-09-28 RX ADMIN — LIDOCAINE HYDROCHLORIDE 5 ML: 10 INJECTION, SOLUTION EPIDURAL; INFILTRATION; INTRACAUDAL; PERINEURAL at 13:09

## 2021-09-28 RX ADMIN — METHYLPREDNISOLONE ACETATE 80 MG: 80 INJECTION, SUSPENSION INTRA-ARTICULAR; INTRALESIONAL; INTRAMUSCULAR; SOFT TISSUE at 13:09

## 2021-09-28 NOTE — PROGRESS NOTES
[facility] [place] [facility] [place]       Office Follow Up Visit       Patient Name: Sera Guidry    Chief Complaint:   Chief Complaint   Patient presents with   • Follow-up     MRI (9/26/21) follow up; Rotator cuff injury, right       Referring Physician: No ref. provider found    History of Present Illness:   It has been 3  week(s) since Sera Guidry's last visit. Sera Guidry returns to clinic today for F/U: follow-up of rightBody Part: shoulderReason: pain. The issue has been ongoing for 3 year(s). Sera Guidry rates HIS/HER: herpain at 9/10 on the pain scale. Previous/current treatments: NSAIDS and home exercise program. Current symptoms:Symptoms: same as prior visit. The pain is worse with walking, standing, sitting, climbing stairs, sleeping, working, leisure, lying on affected side, rising from seated position and any movement of the joint; re-positioning improves the pain. Overall, he/she: sheis doing the same. I have reviewed the patient's history of present illness as noted/entered above.    I have reviewed the patient's past medical history, surgical history, social history, family history, medications, and allergies as noted in the electronic medical record and as noted/entered.  I have reviewed the patient's review of systems as noted/enter and updated as noted in the patient's HPI.      RIGHT SHOULDER    9/28/2021:  RIGHT shoulder pain, MRI completed      PRIOR:  RIGHT SHOULDER chronic pain  HTN - counseled     [place] x7 years, prior from [facility]     Severe scoliosis      Subjective   Subjective      Review of Systems   Constitutional: Negative.  Negative for chills, fatigue and fever.   HENT: Negative.  Negative for congestion and dental problem.    Eyes: Negative.  Negative for blurred vision.   Respiratory: Negative.  Negative for shortness of breath.    Cardiovascular: Negative.  Negative  for leg swelling.   Gastrointestinal: Negative.  Negative for abdominal pain.   Endocrine: Negative.  Negative for polyuria.   Genitourinary: Negative.  Negative for difficulty urinating.   Musculoskeletal: Positive for arthralgias.   Skin: Negative.    Allergic/Immunologic: Negative.    Neurological: Negative.    Hematological: Negative.  Negative for adenopathy.   Psychiatric/Behavioral: Negative.  Negative for behavioral problems.        Past Medical History:   Past Medical History:   Diagnosis Date   • Allergy    • Anxiety    • Asthma    • Cervical cancer (CMS/[facility])    • Chest pain    • COPD (chronic obstructive pulmonary disease) (CMS/[facility])    • Depression    • Endometriosis    • GERD (gastroesophageal reflux disease)    • H/O echocardiogram 04/17/2018    EF 60% mild TR   • Heart murmur    • Hypertension    • Osteoarthritis    • Other hyperlipidemia 11/27/2018   • Ovarian cancer (CMS/[facility])     endometrial/cervical   • Ovarian cyst    • Scoliosis        Past Surgical History:   Past Surgical History:   Procedure Laterality Date   • APPENDECTOMY     • CARDIAC CATHETERIZATION N/A 4/1/2020    Procedure: Coronary angiography;  Surgeon: Chacorta Mohan MD;  Location: [facility] INVASIVE LOCATION;  Service: Cardiology;  Laterality: N/A;   • COLONOSCOPY  09/11/2018    Dr Patrick   • HYSTERECTOMY     • ORIF WRIST FRACTURE Left 09/2017    with 2 plates   • TOTAL ABDOMINAL HYSTERECTOMY WITH SALPINGO OOPHORECTOMY      ovaries removed at 2 separate occcasions       Family History:   Family History   Problem Relation Age of Onset   • Allergies Other    • ADD / ADHD Other    • Heart disease Other         cardiac disorder   • Stroke Other    • Depression Other    • Hypertension Other    • Lung cancer Other    • Uterine cancer Other    • Ovarian cancer Other    • Cancer Mother         left lung   • Stroke Mother         x 2   • Stroke Father         x 3   • Heart disease Father 55        heart attack   • No Known Problems Son     • No Known Problems Son    • No Known Problems Son    • No Known Problems Brother        Social History:   Social History     Socioeconomic History   • Marital status:      Spouse name: Not on file   • Number of children: 3   • Years of education: Not on file   • Highest education level: Not on file   Tobacco Use   • Smoking status: Current Every Day Smoker     Packs/day: 0.50     Years: 40.00     Pack years: 20.00     Types: Cigarettes   • Smokeless tobacco: Never Used   Substance and Sexual Activity   • Alcohol use: Yes     Alcohol/week: 14.0 standard drinks     Types: 14 Cans of beer per week   • Drug use: No     Comment: quit MJ   • Sexual activity: Defer       Medications:   Current Outpatient Medications:   •  albuterol sulfate  (90 Base) MCG/ACT inhaler, Inhale 2 puffs Every 4 (Four) Hours As Needed for Wheezing or Shortness of Air., Disp: 18 g, Rfl: 2  •  aspirin 81 MG EC tablet, Take 1 tablet by mouth Daily., Disp: 30 tablet, Rfl: 0  •  atorvastatin (LIPITOR) 80 MG tablet, Take 1 tablet by mouth Daily., Disp: 30 tablet, Rfl: 6  •  buPROPion XL (Wellbutrin XL) 150 MG 24 hr tablet, Take 1 tablet by mouth Daily., Disp: 30 tablet, Rfl: 3  •  busPIRone (BUSPAR) 15 MG tablet, Take 1 tablet by mouth Daily., Disp: 30 tablet, Rfl: 3  •  Cholecalciferol (VITAMIN D PO), Take 1,000 Units by mouth Daily., Disp: , Rfl:   •  citalopram (CeleXA) 10 MG tablet, Take 1 tablet by mouth Daily., Disp: 30 tablet, Rfl: 3  •  lisinopril (PRINIVIL,ZESTRIL) 20 MG tablet, Take 1 tablet by mouth Daily., Disp: 30 tablet, Rfl: 2  •  metoprolol succinate XL (TOPROL-XL) 25 MG 24 hr tablet, 1/2 tablet po qd, Disp: 30 tablet, Rfl: 3  •  Multiple Vitamins-Minerals (MULTIVITAMIN WOMEN PO), Take 1 tablet by mouth Daily., Disp: , Rfl:   •  nitroglycerin (NITROSTAT) 0.4 MG SL tablet, Place 1 tablet under the tongue Every 5 (Five) Minutes As Needed for Chest Pain. Take no more than 3 doses in 15 minutes., Disp: 25 tablet, Rfl: 1  •  " omeprazole (PrilOSEC) 20 MG capsule, Take 1 capsule by mouth every night at bedtime., Disp: 30 capsule, Rfl: 2  •  vitamin B-12 (CYANOCOBALAMIN) 500 MCG tablet, Take 500 mcg by mouth Daily., Disp: , Rfl:   •  vitamin E 200 UNIT capsule, Take 200 Units by mouth Daily., Disp: , Rfl:     Allergies:   Allergies   Allergen Reactions   • Penicillins Hives       The following portions of the patient's history were reviewed and updated as appropriate: allergies, current medications, past family history, past medical history, past social history, past surgical history and problem list.        Objective    Objective      Vital Signs:   Vitals:    09/28/21 1254   BP: 161/81   Pulse: 75   Weight: 54.4 kg (119 lb 14.9 oz)   Height: 167.6 cm (65.98\")       Ortho Exam:  Right shoulder still has significant limitations primarily due to scapular posture, scapular protraction, scoliosis. This translates into upper trapezius and neck base pain counseled on shoulder versus neck findings. Rotator cuff testing limited due to poor scapular posture and protraction but able to forward flex and abduct findings assistant with positive Neer positive Mata, well-perfused    Results Review:  Imaging Results (Last 24 Hours)     ** No results found for the last 24 hours. **        I personally reviewed the MRI from 9/26/2021 [facility] right shoulder. She has some bursal sided fraying of the supraspinatus, biceps tendinopathy, AC joint arthritic findings. Bursitis and tendinopathy noted. No obvious full-thickness tearing noted in the rotator cuff.  Degenerative labral findings and partial tearing is noted confirmed by radiology        Procedures    Right SHOULDER SUBACROMIAL SPACE INJECTION: Risks and benefits of a shoulder subacromial space injection were discussed and the patient desired to proceed. Verbal consent was obtained. The patient understood the risk of infection, potential skin changes, bump in blood glucose especially " with diabetes, nerve injury, possibility of increased pain in the short term, and possible incomplete pain relief.  Using sterile technique, the shoulder subacromial space was injected from a posterior approach with 1mL of 80mg/mL Depo Medrol and 4cc of lidocaine with aspiration prior to injection. The patient tolerated the procedure without difficulty.  CPT CODE 75729 for major joint aspiration/injection          Assessment / Plan      Assessment/Plan:   Problem List Items Addressed This Visit        Musculoskeletal and Injuries    Bursitis of right shoulder    Relevant Orders    Ambulatory Referral to Physical Therapy Ortho, Evaluate and treat    Impingement syndrome of right shoulder    Relevant Orders    Ambulatory Referral to Physical Therapy Ortho, Evaluate and treat    Biceps tendinitis of right upper extremity    Relevant Orders    Ambulatory Referral to Physical Therapy Ortho, Evaluate and treat    Nontraumatic incomplete tear of right rotator cuff    Relevant Orders    Ambulatory Referral to Physical Therapy Ortho, Evaluate and treat       Neuro    Scapular dyskinesis    Relevant Orders    Ambulatory Referral to Physical Therapy Ortho, Evaluate and treat       Other    Rotator cuff injury, right, initial encounter - Primary    Relevant Orders    Ambulatory Referral to Physical Therapy Ortho, Evaluate and treat          Right shoulder partial tearing no indication for right shoulder arthroscopy at this time. Discussed treatment options the patient agreeable and desires to proceed with a right subacromial injection, physical therapy which I think would be critical for her scapular dyskinesis in the setting of significant scoliosis. Patient understands that some symptomatology may persist due to her scoliosis and scapular issues but therapy can help with this.    Follow Up: 2 months to reassess      Crispin Hylton MD, FAAOS  Orthopedic Surgeon  Fellowship Trained Shoulder and Elbow Surgeon  [facility]  [place]  Orthopedics and Sports Medicine  [address], [address]  [place] [address]    09/28/21  13:17 EDT    Please note that portions of this note may have been completed with a voice recognition program. Efforts were made to edit the dictations, but occasionally words are mistranscribed.

## 2021-09-28 NOTE — PROGRESS NOTES
Procedure   Large Joint Arthrocentesis: R subacromial bursa  Date/Time: 9/28/2021 1:09 PM  Consent given by: patient  Site marked: site marked  Timeout: Immediately prior to procedure a time out was called to verify the correct patient, procedure, equipment, support staff and site/side marked as required   Supporting Documentation  Indications: pain   Procedure Details  Location: shoulder - R subacromial bursa  Preparation: Patient was prepped and draped in the usual sterile fashion  Needle size: 22 G  Approach: anterolateral  Medications administered: 80 mg methylPREDNISolone acetate 80 MG/ML; 5 mL lidocaine PF 1% 1 %  Patient tolerance: patient tolerated the procedure well with no immediate complications

## 2021-10-09 ENCOUNTER — FLU SHOT (OUTPATIENT)
Dept: INTERNAL MEDICINE | Facility: CLINIC | Age: 56
End: 2021-10-09

## 2021-10-09 DIAGNOSIS — Z23 NEED FOR INFLUENZA VACCINATION: Primary | ICD-10-CM

## 2021-10-09 PROCEDURE — 90686 IIV4 VACC NO PRSV 0.5 ML IM: CPT | Performed by: INTERNAL MEDICINE

## 2021-10-09 PROCEDURE — 90471 IMMUNIZATION ADMIN: CPT | Performed by: INTERNAL MEDICINE

## 2021-11-01 ENCOUNTER — APPOINTMENT (OUTPATIENT)
Dept: MAMMOGRAPHY | Facility: HOSPITAL | Age: 56
End: 2021-11-01

## 2021-11-18 ENCOUNTER — APPOINTMENT (OUTPATIENT)
Dept: MAMMOGRAPHY | Facility: HOSPITAL | Age: 56
End: 2021-11-18

## 2021-11-30 ENCOUNTER — OFFICE VISIT (OUTPATIENT)
Dept: ORTHOPEDIC SURGERY | Facility: CLINIC | Age: 56
End: 2021-11-30

## 2021-11-30 VITALS
WEIGHT: 120 LBS | SYSTOLIC BLOOD PRESSURE: 150 MMHG | HEIGHT: 66 IN | BODY MASS INDEX: 19.29 KG/M2 | DIASTOLIC BLOOD PRESSURE: 93 MMHG

## 2021-11-30 DIAGNOSIS — M75.111 NONTRAUMATIC INCOMPLETE TEAR OF RIGHT ROTATOR CUFF: ICD-10-CM

## 2021-11-30 DIAGNOSIS — M75.41 IMPINGEMENT SYNDROME OF RIGHT SHOULDER: ICD-10-CM

## 2021-11-30 DIAGNOSIS — G25.89 SCAPULAR DYSKINESIS: ICD-10-CM

## 2021-11-30 DIAGNOSIS — S46.001A ROTATOR CUFF INJURY, RIGHT, INITIAL ENCOUNTER: Primary | ICD-10-CM

## 2021-11-30 DIAGNOSIS — M75.21 BICEPS TENDINITIS OF RIGHT UPPER EXTREMITY: ICD-10-CM

## 2021-11-30 DIAGNOSIS — M75.51 BURSITIS OF RIGHT SHOULDER: ICD-10-CM

## 2021-11-30 PROCEDURE — 99212 OFFICE O/P EST SF 10 MIN: CPT | Performed by: ORTHOPAEDIC SURGERY

## 2021-11-30 NOTE — PROGRESS NOTES
[facility] [facility] [place]       Office Follow Up Visit       Patient Name: Sera Guidry    Chief Complaint:   Chief Complaint   Patient presents with   • Follow-up     2 months follow up for Rotator cuff injury, right       Referring Physician: No ref. provider found    History of Present Illness:   It has been 2  month(s) since Sera Guidry's last visit. Sera Guidry returns to clinic today for F/U: follow-up of rightBody Part: shoulderReason: rotator cuff injury. The issue has been ongoing for 2 year(s). Sera Guidry rates HIS/HER: herpain at 9/10 on the pain scale. Current symptoms:Symptoms: pain, stiffness and giving way/buckling. The pain is worse with working and lying on affected side; ice and heat improves the pain. Overall, he/she: sheis doing the same.  I have reviewed the patient's history of present illness as noted/entered above.    I have reviewed the patient's past medical history, surgical history, social history, family history, medications, and allergies as noted in the electronic medical record and as noted/entered.  I have reviewed the patient's review of systems as noted/enter and updated as noted in the patient's HPI.    RIGHT SHOULDER    11/30/2021:  2+ years of chronic pain in the right shoulder still rates the pain is 9 of 10, neck pain, radiating pain  Injection helped  Was unable to attend physical therapy due to work schedule --significant scapular dyskinesis scapular protraction and scoliosis limit right shoulder function -- I counseled on the importance of physical therapy    History of neck issues, scoliosis  Hand numbness reported and I counseled on this.    I really encouraged attending PT and provided HEP again--order placed today    9/28/2021:  RIGHT shoulder pain, MRI completed  Subacromial injection completed on the 9/28/2021 visit  I personally reviewed the MRI from 9/26/2021  [facility] right shoulder. She has some bursal sided fraying of the supraspinatus, biceps tendinopathy, AC joint arthritic findings. Bursitis and tendinopathy noted. No obvious full-thickness tearing noted in the rotator cuff.  Degenerative labral findings and partial tearing is noted confirmed by radiology         PRIOR:  RIGHT SHOULDER chronic pain  HTN - counseled     [place] x7 years, prior from [facility]     Severe scoliosis      Subjective   Subjective      Review of Systems   Constitutional: Negative.  Negative for chills, fatigue and fever.   HENT: Negative.  Negative for congestion and dental problem.    Eyes: Negative.  Negative for blurred vision.   Respiratory: Negative.  Negative for shortness of breath.    Cardiovascular: Negative.  Negative for leg swelling.   Gastrointestinal: Negative.  Negative for abdominal pain.   Endocrine: Negative.  Negative for polyuria.   Genitourinary: Negative.  Negative for difficulty urinating.   Musculoskeletal: Positive for arthralgias.   Skin: Negative.    Allergic/Immunologic: Negative.    Neurological: Negative.    Hematological: Negative.  Negative for adenopathy.   Psychiatric/Behavioral: Negative.  Negative for behavioral problems.        Past Medical History:   Past Medical History:   Diagnosis Date   • Allergy    • Anxiety    • Asthma    • Cervical cancer (HCC)    • Chest pain    • COPD (chronic obstructive pulmonary disease) (HCC)    • Depression    • Endometriosis    • GERD (gastroesophageal reflux disease)    • H/O echocardiogram 04/17/2018    EF 60% mild TR   • Heart murmur    • Hypertension    • Osteoarthritis    • Other hyperlipidemia 11/27/2018   • Ovarian cancer (HCC)     endometrial/cervical   • Ovarian cyst    • Scoliosis        Past Surgical History:   Past Surgical History:   Procedure Laterality Date   • APPENDECTOMY     • CARDIAC CATHETERIZATION N/A 4/1/2020    Procedure: Coronary angiography;  Surgeon: Chacorta Mohan,  MD;  Location: [facility] INVASIVE LOCATION;  Service: Cardiology;  Laterality: N/A;   • COLONOSCOPY  09/11/2018    Dr Patrick   • HYSTERECTOMY     • ORIF WRIST FRACTURE Left 09/2017    with 2 plates   • TOTAL ABDOMINAL HYSTERECTOMY WITH SALPINGO OOPHORECTOMY      ovaries removed at 2 separate occcasions       Family History:   Family History   Problem Relation Age of Onset   • Allergies Other    • ADD / ADHD Other    • Heart disease Other         cardiac disorder   • Stroke Other    • Depression Other    • Hypertension Other    • Lung cancer Other    • Uterine cancer Other    • Ovarian cancer Other    • Cancer Mother         left lung   • Stroke Mother         x 2   • Stroke Father         x 3   • Heart disease Father 55        heart attack   • No Known Problems Son    • No Known Problems Son    • No Known Problems Son    • No Known Problems Brother        Social History:   Social History     Socioeconomic History   • Marital status:    • Number of children: 3   Tobacco Use   • Smoking status: Current Every Day Smoker     Packs/day: 0.50     Years: 40.00     Pack years: 20.00     Types: Cigarettes   • Smokeless tobacco: Never Used   Substance and Sexual Activity   • Alcohol use: Yes     Alcohol/week: 14.0 standard drinks     Types: 14 Cans of beer per week   • Drug use: No     Comment: quit MJ   • Sexual activity: Defer       Medications:   Current Outpatient Medications:   •  albuterol sulfate  (90 Base) MCG/ACT inhaler, Inhale 2 puffs Every 4 (Four) Hours As Needed for Wheezing or Shortness of Air., Disp: 18 g, Rfl: 2  •  aspirin 81 MG EC tablet, Take 1 tablet by mouth Daily., Disp: 30 tablet, Rfl: 0  •  atorvastatin (LIPITOR) 80 MG tablet, Take 1 tablet by mouth Daily., Disp: 30 tablet, Rfl: 6  •  buPROPion XL (Wellbutrin XL) 150 MG 24 hr tablet, Take 1 tablet by mouth Daily., Disp: 30 tablet, Rfl: 3  •  busPIRone (BUSPAR) 15 MG tablet, Take 1 tablet by mouth Daily., Disp: 30 tablet, Rfl: 3  •   "Cholecalciferol (VITAMIN D PO), Take 1,000 Units by mouth Daily., Disp: , Rfl:   •  citalopram (CeleXA) 10 MG tablet, Take 1 tablet by mouth Daily., Disp: 30 tablet, Rfl: 3  •  lisinopril (PRINIVIL,ZESTRIL) 20 MG tablet, Take 1 tablet by mouth Daily., Disp: 30 tablet, Rfl: 2  •  metoprolol succinate XL (TOPROL-XL) 25 MG 24 hr tablet, 1/2 tablet po qd, Disp: 30 tablet, Rfl: 3  •  Multiple Vitamins-Minerals (MULTIVITAMIN WOMEN PO), Take 1 tablet by mouth Daily., Disp: , Rfl:   •  nitroglycerin (NITROSTAT) 0.4 MG SL tablet, Place 1 tablet under the tongue Every 5 (Five) Minutes As Needed for Chest Pain. Take no more than 3 doses in 15 minutes., Disp: 25 tablet, Rfl: 1  •  omeprazole (PrilOSEC) 20 MG capsule, Take 1 capsule by mouth every night at bedtime., Disp: 30 capsule, Rfl: 2  •  vitamin B-12 (CYANOCOBALAMIN) 500 MCG tablet, Take 500 mcg by mouth Daily., Disp: , Rfl:   •  vitamin E 200 UNIT capsule, Take 200 Units by mouth Daily., Disp: , Rfl:     Allergies:   Allergies   Allergen Reactions   • Penicillins Hives       The following portions of the patient's history were reviewed and updated as appropriate: allergies, current medications, past family history, past medical history, past social history, past surgical history and problem list.        Objective    Objective      Vital Signs:   Vitals:    11/30/21 1347   BP: 150/93   Weight: 54.4 kg (120 lb)   Height: 167.6 cm (65.98\")       Ortho Exam:  Right shoulder significant scoliosis leads to scapular protraction statically scapular dyskinesis, impingement syndrome overall demonstrates good strength today but limitations with her scapular dyskinesis.  Improvements with scapular retraction testing.    Results Review:  Imaging Results (Last 24 Hours)     ** No results found for the last 24 hours. **          MRI Shoulder Right Without Contrast    Result Date: 9/28/2021  1. Partial-thickness tearing anterior articular-sided fibers of the distal supraspinatus.  2. " Likely chronic tearing and degenerative changes or desiccation of the anterior glenoid labrum.  3. Moderate AC joint DJD, soft tissue edema and inflammation, greatest along the superior aspect.  4. Mild amount of fluid signal within the rotator interval along with moderate-severe inferior capsular thickening concerning for components of adhesive capsulitis in the appropriate clinical setting.   D:  09/28/2021 E:  09/28/2021  This report was finalized on 9/28/2021 3:11 PM by Dr. Chris Gaytan.        Procedures            Assessment / Plan      Assessment/Plan:   Problem List Items Addressed This Visit        Musculoskeletal and Injuries    Bursitis of right shoulder    Relevant Orders    Ambulatory Referral to Physical Therapy Ortho, Evaluate and treat    Impingement syndrome of right shoulder    Relevant Orders    Ambulatory Referral to Physical Therapy Ortho, Evaluate and treat    Biceps tendinitis of right upper extremity    Relevant Orders    Ambulatory Referral to Physical Therapy Ortho, Evaluate and treat    Nontraumatic incomplete tear of right rotator cuff    Relevant Orders    Ambulatory Referral to Physical Therapy Ortho, Evaluate and treat       Neuro    Scapular dyskinesis    Relevant Orders    Ambulatory Referral to Physical Therapy Ortho, Evaluate and treat       Other    Rotator cuff injury, right, initial encounter - Primary    Relevant Orders    Ambulatory Referral to Physical Therapy Ortho, Evaluate and treat          RIGHT SHOULDER -- counseled on conservative course continues to be recommended    HEP provided    Potential neck involvement; counseled on this and she will discuss with primary care provider if formal neck evaluation is desired    Follow Up: EKATERINA Hylton MD, FAAOS  Orthopedic Surgeon  Fellowship Trained Shoulder and Elbow Surgeon  [facility]  Orthopedics and Sports Medicine  [address] [place] [address], [address]  [place] [address]    11/30/21  14:07 EST

## 2021-12-15 ENCOUNTER — HOSPITAL ENCOUNTER (OUTPATIENT)
Dept: PHYSICAL THERAPY | Facility: HOSPITAL | Age: 56
Setting detail: THERAPIES SERIES
Discharge: HOME | End: 2021-12-15

## 2021-12-15 DIAGNOSIS — M75.21 BICEPS TENDINITIS OF RIGHT UPPER EXTREMITY: ICD-10-CM

## 2021-12-15 DIAGNOSIS — G25.89 SCAPULAR DYSKINESIS: ICD-10-CM

## 2021-12-15 DIAGNOSIS — M75.111 NONTRAUMATIC INCOMPLETE TEAR OF RIGHT ROTATOR CUFF: Primary | ICD-10-CM

## 2021-12-15 DIAGNOSIS — M75.51 BURSITIS OF RIGHT SHOULDER: ICD-10-CM

## 2021-12-15 DIAGNOSIS — S46.001A ROTATOR CUFF INJURY, RIGHT, INITIAL ENCOUNTER: ICD-10-CM

## 2021-12-15 DIAGNOSIS — M75.41 IMPINGEMENT SYNDROME OF RIGHT SHOULDER: ICD-10-CM

## 2021-12-15 PROCEDURE — 97161 PT EVAL LOW COMPLEX 20 MIN: CPT | Performed by: GENERAL ACUTE CARE HOSPITAL

## 2021-12-15 NOTE — THERAPY EVALUATION
Outpatient Physical Therapy Ortho Initial Evaluation   [place]     Patient Name: Sera Guidry  : 1965  MRN: [id number]  Today's Date: 12/15/2021      Visit Date: 12/15/2021    Patient Active Problem List   Diagnosis   • Sciatica   • Scoliosis   • Anxiety   • Tobacco use   • Fatigue   • Hot flashes   • Mood swings   • Leg cramps, sleep related   • Encounter for screening colonoscopy   • Osteoporosis screening   • Breast cancer screening   • Skin lesion   • Hormone imbalance   • Screening for cardiovascular condition   • Recurrent major depressive disorder, in partial remission ([facility])   • Chest pain   • Peripheral vascular disease with claudication ([facility])   • Other hyperlipidemia   • Unstable angina ([facility])   • Chronic obstructive pulmonary disease ([facility])   • Essential hypertension   • Acute pain of right shoulder   • Rotator cuff injury, right, initial encounter   • Bursitis of right shoulder   • Impingement syndrome of right shoulder   • Biceps tendinitis of right upper extremity   • Scapular dyskinesis   • Nontraumatic incomplete tear of right rotator cuff        Past Medical History:   Diagnosis Date   • Allergy    • Anxiety    • Asthma    • Cervical cancer ([facility])    • Chest pain    • COPD (chronic obstructive pulmonary disease) ([facility])    • Depression    • Endometriosis    • GERD (gastroesophageal reflux disease)    • H/O echocardiogram 2018    EF 60% mild TR   • Heart murmur    • Hypertension    • Osteoarthritis    • Other hyperlipidemia 2018   • Ovarian cancer ([facility])     endometrial/cervical   • Ovarian cyst    • Scoliosis         Past Surgical History:   Procedure Laterality Date   • APPENDECTOMY     • CARDIAC CATHETERIZATION N/A 2020    Procedure: Coronary angiography;  Surgeon: Chacorta Mohan MD;  Location: [facility] INVASIVE LOCATION;  Service: Cardiology;  Laterality: N/A;   • COLONOSCOPY  2018    Dr Patrick   • HYSTERECTOMY     • ORIF WRIST FRACTURE Left 2017     with 2 plates   • TOTAL ABDOMINAL HYSTERECTOMY WITH SALPINGO OOPHORECTOMY      ovaries removed at 2 separate occcasions       Visit Dx:     ICD-10-CM ICD-9-CM   1. Nontraumatic incomplete tear of right rotator cuff  M75.111 726.13   2. Rotator cuff injury, right, initial encounter  S46.001A 959.2   3. Bursitis of right shoulder  M75.51 726.10   4. Impingement syndrome of right shoulder  M75.41 726.2   5. Biceps tendinitis of right upper extremity  M75.21 726.12   6. Scapular dyskinesis  G25.89 781.3          Patient History     Row Name 12/15/21 8600             History    Chief Complaint Difficulty with daily activities; Joint stiffness; Muscle weakness; Pain  -HP      Type of Pain Shoulder pain  -      Date Current Problem(s) Began --  1.5 years  -      Brief Description of Current Complaint Patient states that over the last 1.5 years she has noticed increased right shoulder pain.  She is unable to perform any movement above 90 degrees of shoulder elevation due to pain.  She also notes scoliosis which she has had since she was a child which was supposed to be surgically fixed but was never done.  -      Patient/Caregiver Goals Relieve pain; Return to prior level of function; Return to work; Improve mobility; Improve strength; Know what to do to help the symptoms  -      Hand Dominance right-handed  -      Occupation/sports/leisure activities Works at [employer]      What clinical tests have you had for this problem? MRI  -              Pain     Pain Location Shoulder  -      Pain at Present 10  -HP      Pain at Best 10  -HP      Pain at Worst 10  -HP      Pain Frequency Constant/continuous  -      Pain Description Aching; Discomfort; Jabbing; Penetrating; Pins and needles; Pounding; Radiating; Sharp; Shooting; Stabbing  -      What Performance Factors Make the Current Problem(s) WORSE? Use of the R shoulder/UE  -      What Performance Factors Make the Current Problem(s) BETTER? Rest  -       Pain Comments Con  -HP      Tolerance Time- Standing WNL  -HP      Tolerance Time- Sitting WNL  -HP      Tolerance Time- Walking WNL  -HP      Tolerance Time- Lying WNL  -HP      Is your sleep disturbed? Yes  -HP      Difficulties at work? Limited use of R UE  -HP      Difficulties with ADL's? Limited use of R UE  -HP      Difficulties with recreational activities? Limited use of R UE  -HP              Fall Risk Assessment    Any falls in the past year: No  -HP              Daily Activities    Primary Language English  -HP      Pt Participated in POC and Goals Yes  -HP            User Key  (r) = Recorded By, (t) = Taken By, (c) = Cosigned By    Initials Name Provider Type    HP Luisito Lu, PT Physical Therapist                 PT Ortho     Row Name 12/15/21 1370       Precautions and Contraindications    Precautions/Limitations no known precautions/limitations  -HP       Posture/Observations    Alignment Options Scoliosis; Scapular winging; Rounded shoulders; Thoracic kyphosis  -HP    Thoracic Kyphosis Moderate  -HP    Rounded Shoulders Moderate  -HP    Scapular winging Severe  -HP    Scoliosis Moderate; Severe  -HP    Posture/Observations Comments Severe TTP to light pressure.  -HP       Quarter Clearing    Quarter Clearing Upper Quarter Clearing  -HP       Sensory Screen for Light Touch- Upper Quarter Clearing    C4 (posterior shoulder) Intact  -HP    C5 (lateral upper arm) Intact  -HP    C6 (tip of thumb) Intact  -HP    C7 (tip of 3rd finger) Intact  -HP    C8 (tip of 5th finger) Intact  -HP    T1 (medial lower arm) Intact  -HP       General ROM    RT Upper Ext Rt Shoulder ABduction; Rt Shoulder Flexion  -HP       Right Upper Ext    Rt Shoulder Abduction AROM 84  -HP    Rt Shoulder Flexion AROM 90  -HP    Rt Upper Extremity Comments  Severe pain with active range of motion of the right shoulder.  Patient unable to perform any movement above 90 degrees of elevation.  -HP       MMT (Manual Muscle Testing)     General MMT Comments Deferred MMT of right shoulder due to severe pain with active range of motion.  -HP          User Key  (r) = Recorded By, (t) = Taken By, (c) = Cosigned By    Initials Name Provider Type    Luisito Mccormick PT Physical Therapist                            Therapy Education  Education Details: HEP included: Pulleys in flexion, table slides in flexion, scapular retractions  Given: HEP, Symptoms/condition management, Pain management, Posture/body mechanics  Program: New  How Provided: Verbal, Demonstration, Written  Provided to: Patient  Level of Understanding: Teach back education performed, Demonstrated, Verbalized      PT OP Goals     Row Name 12/15/21 1430          PT Short Term Goals    STG Date to Achieve 01/05/22  -HP     STG 1 Patient to report improvement of symptoms by 50% or greater.  -HP     STG 1 Progress New  -     STG 2 Patient to report adherence to HEP.  -HP     STG 2 Progress New  -HP     STG 3 Patient to improve active range of motion of right shoulder flexion to 110 degrees.  -HP     STG 3 Progress New  -     STG 4 Patient to report no tenderness to palpation along the right shoulder.  -     STG 4 Progress New  -            Long Term Goals    LTG Date to Achieve 01/26/22  -HP     LTG 1 Patient to report improvement of symptoms by 75% or greater.  -HP     LTG 1 Progress New  -HP     LTG 2 Patient to report independence with HEP.  -HP     LTG 2 Progress New  -HP     LTG 3 Patient to report decreasing QuickDASH score to less than or equal to 30.  -HP     LTG 3 Progress New  -     LTG 4 Patient to display full active range of motion of the right shoulder without pain/limitations.  -     LTG 4 Progress New  -            Time Calculation    PT Goal Re-Cert Due Date 03/15/22  -           User Key  (r) = Recorded By, (t) = Taken By, (c) = Cosigned By    Initials Name Provider Type    Luisito Mccormick PT Physical Therapist                 PT Assessment/Plan     Row  Name 12/15/21 5748          PT Assessment    Functional Limitations Limitation in home management; Limitations in functional capacity and performance; Performance in leisure activities; Performance in self-care ADL; Performance in work activities  -     Impairments Joint mobility; Muscle strength; Pain; Posture; Range of motion  -     Assessment Comments Patient is a 56-year-old female that presents with a low complexity and evolving case of chronic right shoulder pain.  Patient states that for about the last year and a half she has noticed increased right shoulder pain.  An MRI was performed which revealed that she had partial-thickness tearing of the rotator cuff and degenerative changes to the right glenohumeral joint.  She presents with severe TTP with light pressure and severe pain with increased active range of motion of the right shoulder.  Her signs and symptoms are consistent with chronic right shoulder pain and she will benefit from skilled therapy with a focus on improving active range of motion/functional mobility.  -     Please refer to paper survey for additional self-reported information Yes  -HP     Rehab Potential Fair  -HP     Patient/caregiver participated in establishment of treatment plan and goals Yes  -     Patient would benefit from skilled therapy intervention Yes  -HP            PT Plan    PT Frequency 1x/week; 2x/week  -     Predicted Duration of Therapy Intervention (PT) 8-10 visits  -     Planned CPT's? PT EVAL LOW COMPLEXITY: 88158; PT THER PROC EA 15 MIN: 59712; PT THER ACT EA 15 MIN: 03163; PT MANUAL THERAPY EA 15 MIN: 59948; PT NEUROMUSC RE-EDUCATION EA 15 MIN: 55493  -     Physical Therapy Interventions (Optional Details) gross motor skills; home exercise program; joint mobilization; manual therapy techniques; modalities; motor coordination training; neuromuscular re-education; patient/family education; postural re-education; ROM (Range of Motion); strengthening;  stretching  -     PT Plan Comments Patient to benefit from PT services with a focus on improving active range of motion of the right shoulder, improving strength, decreasing pain, and improving functional mobility.  -HP           User Key  (r) = Recorded By, (t) = Taken By, (c) = Cosigned By    Initials Name Provider Type    Luisito Mccormick, PT Physical Therapist                                    Outcome Measure Options: Quick DASH  Quick DASH  Open a tight or new jar.: Moderate Difficulty  Do heavy household chores (e.g., wash walls, wash floors): Mild Difficulty  Carry a shopping bag or briefcase: Mild Difficulty  Wash your back: Mild Difficulty  Use a knife to cut food: No Difficulty  Recreational activities in which you take some force or impact through your arm, should or hand (e.g. golf, hammering, tennis, etc.): Moderate Difficulty  During the past week, to what extent has your arm, shoulder, or hand problem interfered with your normal social activites with family, friends, neighbors or groups?: Quite a bit  During the past week, were you limited in your work or other regular daily activities as a result of your arm, shoulder or hand problem?: Very limited  Arm, Shoulder, or hand pain: Severe  Tingling (pins and needles) in your arm, shoulder, or hand: Moderate  During the past week, how much difficulty have you had sleeping because of the pain in your arm, shoulder or hand?: Moderate Difficiculty  Number of Questions Answered: 11  Quick DASH Score: 45.45         Time Calculation:     Start Time: 1430  Untimed Charges  PT Eval/Re-eval Minutes: 60  Total Minutes  Untimed Charges Total Minutes: 60   Total Minutes: 60     Therapy Charges for Today     Code Description Service Date Service Provider Modifiers Qty    [id number]  PT EVAL LOW COMPLEXITY 4 12/15/2021 Luisito Lu, PT GP 1          PT G-Codes  Outcome Measure Options: Quick DASH  Quick DASH Score: 45.45         Luisito Lu PT  12/15/2021

## 2022-01-03 ENCOUNTER — DOCUMENTATION (OUTPATIENT)
Dept: PHYSICAL THERAPY | Facility: HOSPITAL | Age: 57
End: 2022-01-03

## 2022-01-03 DIAGNOSIS — G25.89 SCAPULAR DYSKINESIS: ICD-10-CM

## 2022-01-03 DIAGNOSIS — M75.111 NONTRAUMATIC INCOMPLETE TEAR OF RIGHT ROTATOR CUFF: Primary | ICD-10-CM

## 2022-01-03 DIAGNOSIS — M75.21 BICEPS TENDINITIS OF RIGHT UPPER EXTREMITY: ICD-10-CM

## 2022-01-03 DIAGNOSIS — M75.41 IMPINGEMENT SYNDROME OF RIGHT SHOULDER: ICD-10-CM

## 2022-01-03 DIAGNOSIS — M75.51 BURSITIS OF RIGHT SHOULDER: ICD-10-CM

## 2022-01-03 DIAGNOSIS — S46.001A ROTATOR CUFF INJURY, RIGHT, INITIAL ENCOUNTER: ICD-10-CM

## 2022-01-03 NOTE — THERAPY DISCHARGE NOTE
Outpatient Physical Therapy Discharge Summary         Patient Name: Sera Guidry  : 1965  MRN: [id number]    Today's Date: 1/3/2022    Visit Dx:    ICD-10-CM ICD-9-CM   1. Nontraumatic incomplete tear of right rotator cuff  M75.111 726.13   2. Rotator cuff injury, right, initial encounter  S46.001A 959.2   3. Bursitis of right shoulder  M75.51 726.10   4. Impingement syndrome of right shoulder  M75.41 726.2   5. Biceps tendinitis of right upper extremity  M75.21 726.12   6. Scapular dyskinesis  G25.89 781.3           OP PT Discharge Summary  Date of Discharge: 22  Reason for Discharge: Non-compliant  Outcomes Achieved: Refer to plan of care for updates on goals achieved  Discharge Destination: Home with home program  Discharge Instructions/Additional Comments: Pt was seen for her initial evaluation and then no showed/no called x3. For this reason, pt to be d.c at this time. Prognosis is fair to good based on pt's status at her initial evaluation.      Time Calculation:                    Luisito Lu, PT  1/3/2022

## 2022-01-05 ENCOUNTER — HOSPITAL ENCOUNTER (OUTPATIENT)
Dept: MAMMOGRAPHY | Facility: HOSPITAL | Age: 57
Discharge: HOME | End: 2022-01-05
Admitting: FAMILY MEDICINE

## 2022-01-05 DIAGNOSIS — Z12.31 ENCOUNTER FOR SCREENING MAMMOGRAM FOR MALIGNANT NEOPLASM OF BREAST: ICD-10-CM

## 2022-01-05 PROCEDURE — 77063 BREAST TOMOSYNTHESIS BI: CPT

## 2022-01-05 PROCEDURE — 77063 BREAST TOMOSYNTHESIS BI: CPT | Performed by: RADIOLOGY

## 2022-01-05 PROCEDURE — 77067 SCR MAMMO BI INCL CAD: CPT

## 2022-01-05 PROCEDURE — 77067 SCR MAMMO BI INCL CAD: CPT | Performed by: RADIOLOGY

## 2022-02-28 DIAGNOSIS — J44.9 CHRONIC OBSTRUCTIVE PULMONARY DISEASE, UNSPECIFIED COPD TYPE: Chronic | ICD-10-CM

## 2022-02-28 DIAGNOSIS — I10 ESSENTIAL HYPERTENSION: Chronic | ICD-10-CM

## 2022-03-01 RX ORDER — ALBUTEROL SULFATE 90 UG/1
2 AEROSOL, METERED RESPIRATORY (INHALATION) EVERY 4 HOURS PRN
Qty: 18 G | Refills: 0 | Status: SHIPPED | OUTPATIENT
Start: 2022-03-01 | End: 2022-04-07 | Stop reason: SDUPTHER

## 2022-03-01 RX ORDER — METOPROLOL SUCCINATE 25 MG/1
TABLET, EXTENDED RELEASE ORAL
Qty: 15 TABLET | Refills: 0 | Status: SHIPPED | OUTPATIENT
Start: 2022-03-01 | End: 2022-04-07 | Stop reason: SDUPTHER

## 2022-03-01 RX ORDER — LISINOPRIL 20 MG/1
20 TABLET ORAL DAILY
Qty: 30 TABLET | Refills: 0 | Status: SHIPPED | OUTPATIENT
Start: 2022-03-01 | End: 2022-04-07 | Stop reason: SDUPTHER

## 2022-03-01 NOTE — TELEPHONE ENCOUNTER
LV: 8/25/21  NV: 3/21/22  LF: 5/12/21 30/2 lisinopril; metoprolol 4/13/21 30/2; albuterol 2/16/21 18/2

## 2022-04-07 ENCOUNTER — OFFICE VISIT (OUTPATIENT)
Dept: INTERNAL MEDICINE | Facility: CLINIC | Age: 57
End: 2022-04-07

## 2022-04-07 ENCOUNTER — LAB (OUTPATIENT)
Dept: LAB | Facility: HOSPITAL | Age: 57
End: 2022-04-07

## 2022-04-07 VITALS
SYSTOLIC BLOOD PRESSURE: 142 MMHG | BODY MASS INDEX: 19.29 KG/M2 | OXYGEN SATURATION: 95 % | WEIGHT: 120 LBS | HEART RATE: 76 BPM | DIASTOLIC BLOOD PRESSURE: 82 MMHG | TEMPERATURE: 97.3 F | HEIGHT: 66 IN

## 2022-04-07 DIAGNOSIS — J44.9 CHRONIC OBSTRUCTIVE PULMONARY DISEASE, UNSPECIFIED COPD TYPE: Chronic | ICD-10-CM

## 2022-04-07 DIAGNOSIS — I73.9 PERIPHERAL VASCULAR DISEASE WITH CLAUDICATION: ICD-10-CM

## 2022-04-07 DIAGNOSIS — F41.9 ANXIETY: ICD-10-CM

## 2022-04-07 DIAGNOSIS — E78.49 OTHER HYPERLIPIDEMIA: ICD-10-CM

## 2022-04-07 DIAGNOSIS — M62.838 MUSCLE SPASM: ICD-10-CM

## 2022-04-07 DIAGNOSIS — E53.8 B12 DEFICIENCY: ICD-10-CM

## 2022-04-07 DIAGNOSIS — I10 ESSENTIAL HYPERTENSION: ICD-10-CM

## 2022-04-07 DIAGNOSIS — M62.830 BACK MUSCLE SPASM: ICD-10-CM

## 2022-04-07 DIAGNOSIS — K21.9 GASTROESOPHAGEAL REFLUX DISEASE: ICD-10-CM

## 2022-04-07 DIAGNOSIS — I10 ESSENTIAL HYPERTENSION: Primary | Chronic | ICD-10-CM

## 2022-04-07 DIAGNOSIS — F33.41 RECURRENT MAJOR DEPRESSIVE DISORDER, IN PARTIAL REMISSION: ICD-10-CM

## 2022-04-07 LAB
BASOPHILS # BLD AUTO: 0.07 10*3/MM3 (ref 0–0.2)
BASOPHILS NFR BLD AUTO: 0.7 % (ref 0–1.5)
DEPRECATED RDW RBC AUTO: 44.4 FL (ref 37–54)
EOSINOPHIL # BLD AUTO: 0.22 10*3/MM3 (ref 0–0.4)
EOSINOPHIL NFR BLD AUTO: 2.1 % (ref 0.3–6.2)
ERYTHROCYTE [DISTWIDTH] IN BLOOD BY AUTOMATED COUNT: 12.1 % (ref 12.3–15.4)
HCT VFR BLD AUTO: 46.6 % (ref 34–46.6)
HGB BLD-MCNC: 16 G/DL (ref 12–15.9)
IMM GRANULOCYTES # BLD AUTO: 0.03 10*3/MM3 (ref 0–0.05)
IMM GRANULOCYTES NFR BLD AUTO: 0.3 % (ref 0–0.5)
LYMPHOCYTES # BLD AUTO: 3.3 10*3/MM3 (ref 0.7–3.1)
LYMPHOCYTES NFR BLD AUTO: 31.9 % (ref 19.6–45.3)
MCH RBC QN AUTO: 34.2 PG (ref 26.6–33)
MCHC RBC AUTO-ENTMCNC: 34.3 G/DL (ref 31.5–35.7)
MCV RBC AUTO: 99.6 FL (ref 79–97)
MONOCYTES # BLD AUTO: 0.77 10*3/MM3 (ref 0.1–0.9)
MONOCYTES NFR BLD AUTO: 7.4 % (ref 5–12)
NEUTROPHILS NFR BLD AUTO: 5.97 10*3/MM3 (ref 1.7–7)
NEUTROPHILS NFR BLD AUTO: 57.6 % (ref 42.7–76)
NRBC BLD AUTO-RTO: 0 /100 WBC (ref 0–0.2)
PLATELET # BLD AUTO: 348 10*3/MM3 (ref 140–450)
PMV BLD AUTO: 9.5 FL (ref 6–12)
RBC # BLD AUTO: 4.68 10*6/MM3 (ref 3.77–5.28)
WBC NRBC COR # BLD: 10.36 10*3/MM3 (ref 3.4–10.8)

## 2022-04-07 PROCEDURE — 80061 LIPID PANEL: CPT | Performed by: FAMILY MEDICINE

## 2022-04-07 PROCEDURE — 82607 VITAMIN B-12: CPT | Performed by: FAMILY MEDICINE

## 2022-04-07 PROCEDURE — 99214 OFFICE O/P EST MOD 30 MIN: CPT | Performed by: FAMILY MEDICINE

## 2022-04-07 PROCEDURE — 80050 GENERAL HEALTH PANEL: CPT | Performed by: FAMILY MEDICINE

## 2022-04-07 PROCEDURE — 83735 ASSAY OF MAGNESIUM: CPT | Performed by: FAMILY MEDICINE

## 2022-04-07 RX ORDER — LISINOPRIL 20 MG/1
20 TABLET ORAL DAILY
Qty: 30 TABLET | Refills: 3 | Status: SHIPPED | OUTPATIENT
Start: 2022-04-07 | End: 2022-07-07 | Stop reason: SDUPTHER

## 2022-04-07 RX ORDER — BUPROPION HYDROCHLORIDE 150 MG/1
150 TABLET ORAL DAILY
Qty: 30 TABLET | Refills: 3 | Status: SHIPPED | OUTPATIENT
Start: 2022-04-07 | End: 2022-07-07 | Stop reason: SDUPTHER

## 2022-04-07 RX ORDER — CITALOPRAM 10 MG/1
10 TABLET ORAL DAILY
Qty: 30 TABLET | Refills: 3 | Status: SHIPPED | OUTPATIENT
Start: 2022-04-07 | End: 2022-07-07 | Stop reason: SDUPTHER

## 2022-04-07 RX ORDER — BUSPIRONE HYDROCHLORIDE 15 MG/1
15 TABLET ORAL DAILY
Qty: 30 TABLET | Refills: 3 | Status: SHIPPED | OUTPATIENT
Start: 2022-04-07 | End: 2022-07-07 | Stop reason: SDUPTHER

## 2022-04-07 RX ORDER — OMEPRAZOLE 20 MG/1
20 CAPSULE, DELAYED RELEASE ORAL
Qty: 30 CAPSULE | Refills: 3 | Status: SHIPPED | OUTPATIENT
Start: 2022-04-07 | End: 2022-07-07 | Stop reason: SDUPTHER

## 2022-04-07 RX ORDER — ALBUTEROL SULFATE 90 UG/1
2 AEROSOL, METERED RESPIRATORY (INHALATION) EVERY 4 HOURS PRN
Qty: 18 G | Refills: 3 | Status: SHIPPED | OUTPATIENT
Start: 2022-04-07

## 2022-04-07 RX ORDER — METOPROLOL SUCCINATE 25 MG/1
TABLET, EXTENDED RELEASE ORAL
Qty: 15 TABLET | Refills: 3 | Status: SHIPPED | OUTPATIENT
Start: 2022-04-07 | End: 2022-07-07 | Stop reason: SDUPTHER

## 2022-04-07 RX ORDER — TIZANIDINE 4 MG/1
4 TABLET ORAL 2 TIMES DAILY PRN
Qty: 60 TABLET | Refills: 2 | Status: SHIPPED | OUTPATIENT
Start: 2022-04-07 | End: 2022-07-07 | Stop reason: SDUPTHER

## 2022-04-07 RX ORDER — ATORVASTATIN CALCIUM 80 MG/1
80 TABLET, FILM COATED ORAL DAILY
Qty: 30 TABLET | Refills: 3 | Status: SHIPPED | OUTPATIENT
Start: 2022-04-07 | End: 2022-07-07 | Stop reason: SDUPTHER

## 2022-04-07 NOTE — PROGRESS NOTES
"Subjective   Sera Guidry is a 56 y.o. female.     Chief Complaint   Patient presents with   • Hypertension   • Anxiety   • Depression   • Spasms     Back and leg.  Dis restarting muscle relaxer        Visit Vitals  /82   Pulse 76   Temp 97.3 °F (36.3 °C)   Ht 167.6 cm (66\")   Wt 54.4 kg (120 lb)   SpO2 95%   BMI 19.37 kg/m²         Hypertension  This is a chronic problem. The current episode started more than 1 year ago. The problem is unchanged. The problem is controlled. Pertinent negatives include no anxiety, blurred vision, chest pain, headaches, malaise/fatigue, neck pain, orthopnea, palpitations, peripheral edema, PND, shortness of breath or sweats. There are no associated agents to hypertension. Risk factors for coronary artery disease include dyslipidemia, post-menopausal state, smoking/tobacco exposure and family history. Current antihypertension treatment includes ACE inhibitors and beta blockers. The current treatment provides significant improvement. There are no compliance problems.  Hypertensive end-organ damage includes CAD/MI. There is no history of angina, kidney disease, CVA, heart failure, left ventricular hypertrophy, PVD or retinopathy. There is no history of chronic renal disease, sleep apnea or a thyroid problem.   Hyperlipidemia  This is a chronic problem. The current episode started more than 1 year ago. The problem is controlled. Recent lipid tests were reviewed and are normal. She has no history of chronic renal disease, diabetes, hypothyroidism, liver disease, obesity or nephrotic syndrome. Factors aggravating her hyperlipidemia include beta blockers. Pertinent negatives include no chest pain, focal sensory loss, focal weakness, leg pain, myalgias (muscle cramps in legs and back) or shortness of breath. Current antihyperlipidemic treatment includes statins. The current treatment provides significant improvement of lipids. There are no compliance problems.  Risk factors for " coronary artery disease include dyslipidemia, family history, hypertension and post-menopausal.   Depression  Visit Type: follow-up  Patient is not experiencing: anhedonia, chest pain, choking sensation, compulsions, confusion, decreased concentration, depressed mood, dizziness, dry mouth, excessive worry, fatigue, feelings of hopelessness, feelings of worthlessness, hypersomnia, hyperventilation, insomnia, irritability, malaise, memory impairment, muscle tension, nausea, nervousness/anxiety, obsessions, palpitations, panic, psychomotor agitation, psychomotor retardation, restlessness, shortness of breath, suicidal ideas, suicidal planning, thoughts of death, weight gain and weight loss.  Frequency of symptoms: rarely   Severity: mild   Sleep quality: good  Nighttime awakenings: none  Compliance with medications:  %        Anxiety  Presents for follow-up visit. Patient reports no chest pain, compulsions, confusion, decreased concentration, depressed mood, dizziness, dry mouth, excessive worry, feeling of choking, hyperventilation, insomnia, irritability, malaise, muscle tension, nausea, nervous/anxious behavior, obsessions, palpitations, panic, restlessness, shortness of breath or suicidal ideas. Symptoms occur rarely. The severity of symptoms is mild. The quality of sleep is good. Nighttime awakenings: none.     Her past medical history is significant for depression. Compliance with medications is %.   Back Pain  This is a chronic problem. The current episode started more than 1 year ago. The problem occurs daily (various parts of back). The problem has been waxing and waning since onset. The pain is present in the lumbar spine (back pain when standing 4-5 hours). The pain radiates to the left foot, right foot, right thigh, left thigh, right knee and left knee. The pain is the same all the time. The symptoms are aggravated by standing and bending. Pertinent negatives include no abdominal pain, bladder  incontinence, bowel incontinence, chest pain, dysuria, fever, headaches, leg pain, numbness, paresis, paresthesias, pelvic pain, perianal numbness, tingling, weakness or weight loss. Risk factors include history of cancer and menopause. She has tried NSAIDs, home exercises and muscle relaxant for the symptoms. The treatment provided moderate relief.      Pt gets cold and clammy when she gets up in the morning.    Pt is getting muscle spasm in legs and back.     Pt has reflux and needs refill on omeprazole.   The following portions of the patient's history were reviewed and updated as appropriate: allergies, current medications, past family history, past medical history, past social history, past surgical history and problem list.    Past Medical History:   Diagnosis Date   • Allergy    • Anxiety    • Asthma    • Cervical cancer (HCC)    • Chest pain    • COPD (chronic obstructive pulmonary disease) ([facility])    • Depression    • Endometriosis    • GERD (gastroesophageal reflux disease)    • H/O echocardiogram 04/17/2018    EF 60% mild TR   • Heart murmur    • Hypertension    • Osteoarthritis    • Other hyperlipidemia 11/27/2018   • Ovarian cancer (HCC)     endometrial/cervical   • Ovarian cyst    • Scoliosis       Past Surgical History:   Procedure Laterality Date   • APPENDECTOMY     • CARDIAC CATHETERIZATION N/A 4/1/2020    Procedure: Coronary angiography;  Surgeon: Chacorta Mohan MD;  Location: Located within [facility] INVASIVE LOCATION;  Service: Cardiology;  Laterality: N/A;   • COLONOSCOPY  09/11/2018    Dr Patrick   • HYSTERECTOMY     • ORIF WRIST FRACTURE Left 09/2017    with 2 plates   • TOTAL ABDOMINAL HYSTERECTOMY WITH SALPINGO OOPHORECTOMY      ovaries removed at 2 separate occcasions      Family History   Problem Relation Age of Onset   • Allergies Other    • ADD / ADHD Other    • Heart disease Other         cardiac disorder   • Stroke Other    • Depression Other    • Hypertension Other    • Lung cancer Other    •  Uterine cancer Other    • Ovarian cancer Other    • Cancer Mother         left lung   • Stroke Mother         x 2   • Stroke Father         x 3   • Heart disease Father 55        heart attack   • No Known Problems Son    • No Known Problems Son    • No Known Problems Son    • No Known Problems Brother    • Ovarian cancer Maternal Aunt 45   • Breast cancer Neg Hx       Social History     Socioeconomic History   • Marital status:    • Number of children: 3   Tobacco Use   • Smoking status: Current Every Day Smoker     Packs/day: 0.25     Years: 40.00     Pack years: 10.00     Types: Cigarettes   • Smokeless tobacco: Never Used   Substance and Sexual Activity   • Alcohol use: Yes     Alcohol/week: 14.0 standard drinks     Types: 14 Cans of beer per week   • Drug use: No     Comment: quit MJ   • Sexual activity: Defer      Allergies   Allergen Reactions   • Penicillins Hives       Review of Systems   Constitutional: Negative for fever, irritability, malaise/fatigue, weight gain and weight loss.   Eyes: Negative for blurred vision.   Respiratory: Negative for choking and shortness of breath.    Cardiovascular: Negative for chest pain, palpitations, orthopnea and PND.   Gastrointestinal: Negative for abdominal pain, bowel incontinence and nausea.   Genitourinary: Negative for bladder incontinence, dysuria and pelvic pain.   Musculoskeletal: Positive for back pain. Negative for myalgias (muscle cramps in legs and back) and neck pain.   Neurological: Negative for dizziness, tingling, focal weakness, weakness, numbness, headaches and paresthesias.   Psychiatric/Behavioral: Negative for confusion, decreased concentration and suicidal ideas. The patient is not nervous/anxious and does not have insomnia.        Objective   Physical Exam  Vitals and nursing note reviewed.   Constitutional:       Appearance: She is well-developed.   HENT:      Head: Normocephalic.      Right Ear: External ear normal.      Left Ear:  External ear normal.      Nose: Nose normal.   Eyes:      General: Lids are normal.      Conjunctiva/sclera: Conjunctivae normal.      Pupils: Pupils are equal, round, and reactive to light.   Neck:      Thyroid: No thyroid mass or thyromegaly.      Vascular: No carotid bruit.      Trachea: Trachea normal.   Cardiovascular:      Rate and Rhythm: Normal rate and regular rhythm.      Heart sounds: No murmur heard.  Pulmonary:      Effort: Pulmonary effort is normal. No respiratory distress.      Breath sounds: Normal breath sounds. No decreased breath sounds, wheezing, rhonchi or rales.   Chest:      Chest wall: No tenderness.   Abdominal:      General: Bowel sounds are normal.      Palpations: Abdomen is soft.      Tenderness: There is no abdominal tenderness.   Musculoskeletal:         General: Normal range of motion.      Cervical back: Normal range of motion and neck supple.      Lumbar back: Spasms and tenderness present.   Skin:     General: Skin is warm and dry.   Neurological:      Mental Status: She is alert and oriented to person, place, and time.   Psychiatric:         Behavior: Behavior normal.         Assessment/Plan   Diagnoses and all orders for this visit:    1. Essential hypertension (Primary)  -     lisinopril (PRINIVIL,ZESTRIL) 20 MG tablet; Take 1 tablet by mouth Daily.  Dispense: 30 tablet; Refill: 3  -     metoprolol succinate XL (TOPROL-XL) 25 MG 24 hr tablet; 1/2 tablet po qd  Dispense: 15 tablet; Refill: 3  -     Comprehensive Metabolic Panel; Future  -     TSH Rfx On Abnormal To Free T4; Future  -     Lipid Panel; Future  -     CBC & Differential; Future    2. Other hyperlipidemia  -     atorvastatin (LIPITOR) 80 MG tablet; Take 1 tablet by mouth Daily.  Dispense: 30 tablet; Refill: 3  -     Comprehensive Metabolic Panel; Future  -     Lipid Panel; Future    3. Peripheral vascular disease with claudication (HCC)  -     atorvastatin (LIPITOR) 80 MG tablet; Take 1 tablet by mouth Daily.   Dispense: 30 tablet; Refill: 3    4. Gastroesophageal reflux disease  -     omeprazole (PrilOSEC) 20 MG capsule; Take 1 capsule by mouth every night at bedtime.  Dispense: 30 capsule; Refill: 3    5. Chronic obstructive pulmonary disease, unspecified COPD type (HCC)  -     albuterol sulfate  (90 Base) MCG/ACT inhaler; Inhale 2 puffs Every 4 (Four) Hours As Needed for Wheezing or Shortness of Air.  Dispense: 18 g; Refill: 3    6. Recurrent major depressive disorder, in partial remission (HCC)  -     buPROPion XL (Wellbutrin XL) 150 MG 24 hr tablet; Take 1 tablet by mouth Daily.  Dispense: 30 tablet; Refill: 3  -     citalopram (CeleXA) 10 MG tablet; Take 1 tablet by mouth Daily.  Dispense: 30 tablet; Refill: 3    7. Anxiety  -     busPIRone (BUSPAR) 15 MG tablet; Take 1 tablet by mouth Daily.  Dispense: 30 tablet; Refill: 3    8. Back muscle spasm  -     tiZANidine (ZANAFLEX) 4 MG tablet; Take 1 tablet by mouth 2 (Two) Times a Day As Needed for Muscle Spasms.  Dispense: 60 tablet; Refill: 2    9. B12 deficiency  -     Vitamin B12; Future    10. Muscle spasm  -     Magnesium; Future                   Current Outpatient Medications:   •  albuterol sulfate  (90 Base) MCG/ACT inhaler, Inhale 2 puffs Every 4 (Four) Hours As Needed for Wheezing or Shortness of Air., Disp: 18 g, Rfl: 3  •  aspirin 81 MG EC tablet, Take 1 tablet by mouth Daily., Disp: 30 tablet, Rfl: 0  •  atorvastatin (LIPITOR) 80 MG tablet, Take 1 tablet by mouth Daily., Disp: 30 tablet, Rfl: 3  •  buPROPion XL (Wellbutrin XL) 150 MG 24 hr tablet, Take 1 tablet by mouth Daily., Disp: 30 tablet, Rfl: 3  •  busPIRone (BUSPAR) 15 MG tablet, Take 1 tablet by mouth Daily., Disp: 30 tablet, Rfl: 3  •  Cholecalciferol (VITAMIN D PO), Take 1,000 Units by mouth Daily., Disp: , Rfl:   •  citalopram (CeleXA) 10 MG tablet, Take 1 tablet by mouth Daily., Disp: 30 tablet, Rfl: 3  •  lisinopril (PRINIVIL,ZESTRIL) 20 MG tablet, Take 1 tablet by mouth Daily.,  Disp: 30 tablet, Rfl: 3  •  metoprolol succinate XL (TOPROL-XL) 25 MG 24 hr tablet, 1/2 tablet po qd, Disp: 15 tablet, Rfl: 3  •  Multiple Vitamins-Minerals (MULTIVITAMIN WOMEN PO), Take 1 tablet by mouth Daily., Disp: , Rfl:   •  nitroglycerin (NITROSTAT) 0.4 MG SL tablet, Place 1 tablet under the tongue Every 5 (Five) Minutes As Needed for Chest Pain. Take no more than 3 doses in 15 minutes., Disp: 25 tablet, Rfl: 1  •  omeprazole (PrilOSEC) 20 MG capsule, Take 1 capsule by mouth every night at bedtime., Disp: 30 capsule, Rfl: 3  •  vitamin B-12 (CYANOCOBALAMIN) 500 MCG tablet, Take 500 mcg by mouth Daily., Disp: , Rfl:   •  vitamin E 200 UNIT capsule, Take 200 Units by mouth Daily., Disp: , Rfl:   •  tiZANidine (ZANAFLEX) 4 MG tablet, Take 1 tablet by mouth 2 (Two) Times a Day As Needed for Muscle Spasms., Disp: 60 tablet, Rfl: 2    Return in about 3 months (around 7/7/2022), or if symptoms worsen or fail to improve, for Recheck.

## 2022-04-08 PROBLEM — J43.2 CENTRILOBULAR EMPHYSEMA (HCC): Status: ACTIVE | Noted: 2022-04-08

## 2022-04-08 LAB
ALBUMIN SERPL-MCNC: 5 G/DL (ref 3.5–5.2)
ALBUMIN/GLOB SERPL: 1.8 G/DL
ALP SERPL-CCNC: 98 U/L (ref 39–117)
ALT SERPL W P-5'-P-CCNC: 20 U/L (ref 1–33)
ANION GAP SERPL CALCULATED.3IONS-SCNC: 12.9 MMOL/L (ref 5–15)
AST SERPL-CCNC: 22 U/L (ref 1–32)
BILIRUB SERPL-MCNC: <0.2 MG/DL (ref 0–1.2)
BUN SERPL-MCNC: 10 MG/DL (ref 6–20)
BUN/CREAT SERPL: 12.5 (ref 7–25)
CALCIUM SPEC-SCNC: 9.8 MG/DL (ref 8.6–10.5)
CHLORIDE SERPL-SCNC: 97 MMOL/L (ref 98–107)
CHOLEST SERPL-MCNC: 221 MG/DL (ref 0–200)
CO2 SERPL-SCNC: 25.1 MMOL/L (ref 22–29)
CREAT SERPL-MCNC: 0.8 MG/DL (ref 0.57–1)
EGFRCR SERPLBLD CKD-EPI 2021: 86.1 ML/MIN/1.73
GLOBULIN UR ELPH-MCNC: 2.8 GM/DL
GLUCOSE SERPL-MCNC: 88 MG/DL (ref 65–99)
HDLC SERPL-MCNC: 88 MG/DL (ref 40–60)
LDLC SERPL CALC-MCNC: 100 MG/DL (ref 0–100)
LDLC/HDLC SERPL: 1.07 {RATIO}
MAGNESIUM SERPL-MCNC: 2.1 MG/DL (ref 1.6–2.6)
POTASSIUM SERPL-SCNC: 4.2 MMOL/L (ref 3.5–5.2)
PROT SERPL-MCNC: 7.8 G/DL (ref 6–8.5)
SODIUM SERPL-SCNC: 135 MMOL/L (ref 136–145)
TRIGL SERPL-MCNC: 196 MG/DL (ref 0–150)
TSH SERPL DL<=0.05 MIU/L-ACNC: 2.16 UIU/ML (ref 0.27–4.2)
VIT B12 BLD-MCNC: 500 PG/ML (ref 211–946)
VLDLC SERPL-MCNC: 33 MG/DL (ref 5–40)

## 2022-07-07 ENCOUNTER — OFFICE VISIT (OUTPATIENT)
Dept: INTERNAL MEDICINE | Facility: CLINIC | Age: 57
End: 2022-07-07

## 2022-07-07 VITALS
SYSTOLIC BLOOD PRESSURE: 122 MMHG | HEART RATE: 70 BPM | DIASTOLIC BLOOD PRESSURE: 78 MMHG | WEIGHT: 113 LBS | TEMPERATURE: 97.5 F | BODY MASS INDEX: 18.16 KG/M2 | HEIGHT: 66 IN | OXYGEN SATURATION: 96 %

## 2022-07-07 DIAGNOSIS — F33.41 RECURRENT MAJOR DEPRESSIVE DISORDER, IN PARTIAL REMISSION: ICD-10-CM

## 2022-07-07 DIAGNOSIS — I73.9 PERIPHERAL VASCULAR DISEASE WITH CLAUDICATION: ICD-10-CM

## 2022-07-07 DIAGNOSIS — E78.49 OTHER HYPERLIPIDEMIA: ICD-10-CM

## 2022-07-07 DIAGNOSIS — M62.830 BACK MUSCLE SPASM: ICD-10-CM

## 2022-07-07 DIAGNOSIS — F41.9 ANXIETY: ICD-10-CM

## 2022-07-07 DIAGNOSIS — K21.9 GASTROESOPHAGEAL REFLUX DISEASE: ICD-10-CM

## 2022-07-07 DIAGNOSIS — I10 ESSENTIAL HYPERTENSION: Primary | Chronic | ICD-10-CM

## 2022-07-07 DIAGNOSIS — R61 NIGHT SWEAT: ICD-10-CM

## 2022-07-07 PROCEDURE — 99214 OFFICE O/P EST MOD 30 MIN: CPT | Performed by: FAMILY MEDICINE

## 2022-07-07 RX ORDER — OMEPRAZOLE 20 MG/1
20 CAPSULE, DELAYED RELEASE ORAL
Qty: 30 CAPSULE | Refills: 3 | Status: SHIPPED | OUTPATIENT
Start: 2022-07-07 | End: 2022-11-10 | Stop reason: SDUPTHER

## 2022-07-07 RX ORDER — BUPROPION HYDROCHLORIDE 150 MG/1
150 TABLET ORAL DAILY
Qty: 30 TABLET | Refills: 3 | Status: SHIPPED | OUTPATIENT
Start: 2022-07-07 | End: 2022-11-10 | Stop reason: SDUPTHER

## 2022-07-07 RX ORDER — METOPROLOL SUCCINATE 25 MG/1
TABLET, EXTENDED RELEASE ORAL
Qty: 15 TABLET | Refills: 3 | Status: SHIPPED | OUTPATIENT
Start: 2022-07-07 | End: 2022-11-10 | Stop reason: SDUPTHER

## 2022-07-07 RX ORDER — TIZANIDINE 4 MG/1
4 TABLET ORAL 2 TIMES DAILY PRN
Qty: 60 TABLET | Refills: 2 | Status: SHIPPED | OUTPATIENT
Start: 2022-07-07 | End: 2022-11-10 | Stop reason: SDUPTHER

## 2022-07-07 RX ORDER — BUSPIRONE HYDROCHLORIDE 15 MG/1
15 TABLET ORAL DAILY
Qty: 30 TABLET | Refills: 3 | Status: SHIPPED | OUTPATIENT
Start: 2022-07-07 | End: 2022-11-10 | Stop reason: SDUPTHER

## 2022-07-07 RX ORDER — CITALOPRAM 10 MG/1
10 TABLET ORAL DAILY
Qty: 30 TABLET | Refills: 3 | Status: SHIPPED | OUTPATIENT
Start: 2022-07-07 | End: 2022-11-10 | Stop reason: SDUPTHER

## 2022-07-07 RX ORDER — ATORVASTATIN CALCIUM 80 MG/1
80 TABLET, FILM COATED ORAL DAILY
Qty: 30 TABLET | Refills: 3 | Status: SHIPPED | OUTPATIENT
Start: 2022-07-07 | End: 2022-11-10 | Stop reason: SDUPTHER

## 2022-07-07 RX ORDER — LISINOPRIL 20 MG/1
20 TABLET ORAL DAILY
Qty: 30 TABLET | Refills: 3 | Status: SHIPPED | OUTPATIENT
Start: 2022-07-07 | End: 2022-11-10 | Stop reason: SDUPTHER

## 2022-07-07 NOTE — PROGRESS NOTES
"Subjective   Sera Guidry is a 56 y.o. female.     Chief Complaint   Patient presents with   • Hypertension   • Hyperlipidemia   • Anxiety   • Depression       Visit Vitals  /78   Pulse 70   Temp 97.5 °F (36.4 °C)   Ht 167.6 cm (66\")   Wt 51.3 kg (113 lb)   SpO2 96%   BMI 18.24 kg/m²       Wt Readings from Last 3 Encounters:   07/07/22 51.3 kg (113 lb)   04/07/22 54.4 kg (120 lb)   11/30/21 54.4 kg (120 lb)         Hypertension  This is a chronic problem. The current episode started more than 1 year ago. The problem is unchanged. The problem is controlled. Associated symptoms include anxiety and sweats. Pertinent negatives include no blurred vision, chest pain, headaches, malaise/fatigue, neck pain, orthopnea, palpitations, peripheral edema, PND or shortness of breath. There are no associated agents to hypertension. Risk factors for coronary artery disease include post-menopausal state and family history. Current antihypertension treatment includes ACE inhibitors and beta blockers. The current treatment provides significant improvement. There are no compliance problems.  There is no history of angina, kidney disease, CAD/MI, CVA, heart failure, left ventricular hypertrophy, PVD or retinopathy. There is no history of chronic renal disease, sleep apnea or a thyroid problem.   Hyperlipidemia  This is a chronic problem. The current episode started more than 1 year ago. The problem is controlled. Recent lipid tests were reviewed and are normal. She has no history of chronic renal disease, diabetes, hypothyroidism, liver disease, obesity or nephrotic syndrome. Pertinent negatives include no chest pain, focal sensory loss, focal weakness, leg pain, myalgias or shortness of breath. Current antihyperlipidemic treatment includes statins. The current treatment provides significant improvement of lipids. There are no compliance problems.  Risk factors for coronary artery disease include dyslipidemia, family history, " hypertension and post-menopausal.   Depression  Visit Type: follow-up  Patient is not experiencing: anhedonia, chest pain, choking sensation, compulsions, confusion, decreased concentration, depressed mood, dizziness, dry mouth, excessive worry, fatigue, feelings of hopelessness, feelings of worthlessness, hypersomnia, hyperventilation, insomnia, irritability, malaise, memory impairment, muscle tension, nausea, nervousness/anxiety, obsessions, palpitations, panic, psychomotor agitation, psychomotor retardation, restlessness, shortness of breath, suicidal ideas, suicidal planning, thoughts of death, weight gain and weight loss.  Frequency of symptoms: rarely   Severity: mild   Sleep quality: fair  Nighttime awakenings: occasional  Compliance with medications:  %        Anxiety  Presents for follow-up visit. Patient reports no chest pain, compulsions, confusion, decreased concentration, depressed mood, dizziness, dry mouth, excessive worry, feeling of choking, hyperventilation, insomnia, irritability, malaise, muscle tension, nausea, nervous/anxious behavior, obsessions, palpitations, panic, restlessness, shortness of breath or suicidal ideas. The quality of sleep is fair. Nighttime awakenings: occasional.     Her past medical history is significant for depression. Compliance with medications is %.      Pt has changed jobs from [employer] to to [employer] and is less stressed.   Pt also reports no overhead work.     Pt has been having hot flashes. Pt is already sleeping cool. Pt is not eating spicy food.   The following portions of the patient's history were reviewed and updated as appropriate: allergies, current medications, past family history, past medical history, past social history, past surgical history and problem list.    Past Medical History:   Diagnosis Date   • Allergy    • Anxiety    • Asthma    • Centrilobular emphysema (HCC) 4/8/2022   • Cervical cancer (HCC)    • Chest pain    • COPD  (chronic obstructive pulmonary disease) (HCC)    • Depression    • Endometriosis    • GERD (gastroesophageal reflux disease)    • H/O echocardiogram 04/17/2018    EF 60% mild TR   • Heart murmur    • Hypertension    • Osteoarthritis    • Other hyperlipidemia 11/27/2018   • Ovarian cancer (HCC)     endometrial/cervical   • Ovarian cyst    • Scoliosis       Past Surgical History:   Procedure Laterality Date   • APPENDECTOMY     • CARDIAC CATHETERIZATION N/A 4/1/2020    Procedure: Coronary angiography;  Surgeon: Chacorta Mohan MD;  Location: [facility] INVASIVE LOCATION;  Service: Cardiology;  Laterality: N/A;   • COLONOSCOPY  09/11/2018    Dr Patrick   • HYSTERECTOMY     • ORIF WRIST FRACTURE Left 09/2017    with 2 plates   • TOTAL ABDOMINAL HYSTERECTOMY WITH SALPINGO OOPHORECTOMY      ovaries removed at 2 separate occcasions      Family History   Problem Relation Age of Onset   • Allergies Other    • ADD / ADHD Other    • Heart disease Other         cardiac disorder   • Stroke Other    • Depression Other    • Hypertension Other    • Lung cancer Other    • Uterine cancer Other    • Ovarian cancer Other    • Cancer Mother         left lung   • Stroke Mother         x 2   • Stroke Father         x 3   • Heart disease Father 55        heart attack   • No Known Problems Son    • No Known Problems Son    • No Known Problems Son    • No Known Problems Brother    • Ovarian cancer Maternal Aunt 45   • Breast cancer Neg Hx       Social History     Socioeconomic History   • Marital status:    • Number of children: 3   Tobacco Use   • Smoking status: Current Every Day Smoker     Packs/day: 0.25     Years: 40.00     Pack years: 10.00     Types: Cigarettes   • Smokeless tobacco: Never Used   Substance and Sexual Activity   • Alcohol use: Yes     Alcohol/week: 14.0 standard drinks     Types: 14 Cans of beer per week   • Drug use: No     Comment: quit MJ   • Sexual activity: Defer      Allergies   Allergen Reactions   •  Penicillins Hives       Review of Systems   Constitutional: Negative for irritability, malaise/fatigue, weight gain and weight loss.   Eyes: Negative for blurred vision.   Respiratory: Negative for choking and shortness of breath.    Cardiovascular: Negative for chest pain, palpitations, orthopnea and PND.   Gastrointestinal: Negative for nausea.   Musculoskeletal: Negative for myalgias and neck pain.   Neurological: Negative for dizziness, focal weakness and headaches.   Psychiatric/Behavioral: Negative for confusion, decreased concentration and suicidal ideas. The patient is not nervous/anxious and does not have insomnia.        Objective   Physical Exam  Vitals and nursing note reviewed.   Constitutional:       Appearance: She is well-developed.   HENT:      Head: Normocephalic.      Right Ear: External ear normal.      Left Ear: External ear normal.      Nose: Nose normal.   Eyes:      General: Lids are normal.      Conjunctiva/sclera: Conjunctivae normal.      Pupils: Pupils are equal, round, and reactive to light.   Neck:      Thyroid: No thyroid mass or thyromegaly.      Vascular: No carotid bruit.      Trachea: Trachea normal.   Cardiovascular:      Rate and Rhythm: Normal rate and regular rhythm.      Heart sounds: No murmur heard.  Pulmonary:      Effort: Pulmonary effort is normal. No respiratory distress.      Breath sounds: Normal breath sounds. No decreased breath sounds, wheezing, rhonchi or rales.   Chest:      Chest wall: No tenderness.   Abdominal:      General: Bowel sounds are normal.      Palpations: Abdomen is soft.      Tenderness: There is no abdominal tenderness.   Musculoskeletal:         General: Normal range of motion.      Cervical back: Normal range of motion and neck supple.   Skin:     General: Skin is warm and dry.   Neurological:      Mental Status: She is alert and oriented to person, place, and time.   Psychiatric:         Behavior: Behavior normal.         Assessment & Plan    Diagnoses and all orders for this visit:    1. Essential hypertension (Primary)  -     Comprehensive Metabolic Panel; Future  -     Lipid Panel; Future  -     metoprolol succinate XL (TOPROL-XL) 25 MG 24 hr tablet; 1/2 tablet po qd  Dispense: 15 tablet; Refill: 3  -     lisinopril (PRINIVIL,ZESTRIL) 20 MG tablet; Take 1 tablet by mouth Daily.  Dispense: 30 tablet; Refill: 3    2. Night sweat  -     QuantiFERON TB Plus Client Incubated; Future    3. Recurrent major depressive disorder, in partial remission (HCC)  -     citalopram (CeleXA) 10 MG tablet; Take 1 tablet by mouth Daily.  Dispense: 30 tablet; Refill: 3  -     buPROPion XL (Wellbutrin XL) 150 MG 24 hr tablet; Take 1 tablet by mouth Daily.  Dispense: 30 tablet; Refill: 3    4. Anxiety  -     busPIRone (BUSPAR) 15 MG tablet; Take 1 tablet by mouth Daily.  Dispense: 30 tablet; Refill: 3    5. Gastroesophageal reflux disease  -     omeprazole (PrilOSEC) 20 MG capsule; Take 1 capsule by mouth every night at bedtime.  Dispense: 30 capsule; Refill: 3    6. Back muscle spasm  -     tiZANidine (ZANAFLEX) 4 MG tablet; Take 1 tablet by mouth 2 (Two) Times a Day As Needed for Muscle Spasms.  Dispense: 60 tablet; Refill: 2    7. Other hyperlipidemia  -     Comprehensive Metabolic Panel; Future  -     Lipid Panel; Future  -     atorvastatin (LIPITOR) 80 MG tablet; Take 1 tablet by mouth Daily.  Dispense: 30 tablet; Refill: 3    8. Peripheral vascular disease with claudication (HCC)  -     atorvastatin (LIPITOR) 80 MG tablet; Take 1 tablet by mouth Daily.  Dispense: 30 tablet; Refill: 3      Get covid booster             Current Outpatient Medications:   •  albuterol sulfate  (90 Base) MCG/ACT inhaler, Inhale 2 puffs Every 4 (Four) Hours As Needed for Wheezing or Shortness of Air., Disp: 18 g, Rfl: 3  •  aspirin 81 MG EC tablet, Take 1 tablet by mouth Daily., Disp: 30 tablet, Rfl: 0  •  Cholecalciferol (VITAMIN D PO), Take 1,000 Units by mouth Daily., Disp: ,  Rfl:   •  Multiple Vitamins-Minerals (MULTIVITAMIN WOMEN PO), Take 1 tablet by mouth Daily., Disp: , Rfl:   •  nitroglycerin (NITROSTAT) 0.4 MG SL tablet, Place 1 tablet under the tongue Every 5 (Five) Minutes As Needed for Chest Pain. Take no more than 3 doses in 15 minutes., Disp: 25 tablet, Rfl: 1  •  vitamin B-12 (CYANOCOBALAMIN) 500 MCG tablet, Take 500 mcg by mouth Daily., Disp: , Rfl:   •  vitamin E 200 UNIT capsule, Take 200 Units by mouth Daily., Disp: , Rfl:   •  atorvastatin (LIPITOR) 80 MG tablet, Take 1 tablet by mouth Daily., Disp: 30 tablet, Rfl: 3  •  buPROPion XL (Wellbutrin XL) 150 MG 24 hr tablet, Take 1 tablet by mouth Daily., Disp: 30 tablet, Rfl: 3  •  busPIRone (BUSPAR) 15 MG tablet, Take 1 tablet by mouth Daily., Disp: 30 tablet, Rfl: 3  •  citalopram (CeleXA) 10 MG tablet, Take 1 tablet by mouth Daily., Disp: 30 tablet, Rfl: 3  •  lisinopril (PRINIVIL,ZESTRIL) 20 MG tablet, Take 1 tablet by mouth Daily., Disp: 30 tablet, Rfl: 3  •  metoprolol succinate XL (TOPROL-XL) 25 MG 24 hr tablet, 1/2 tablet po qd, Disp: 15 tablet, Rfl: 3  •  omeprazole (PrilOSEC) 20 MG capsule, Take 1 capsule by mouth every night at bedtime., Disp: 30 capsule, Rfl: 3  •  tiZANidine (ZANAFLEX) 4 MG tablet, Take 1 tablet by mouth 2 (Two) Times a Day As Needed for Muscle Spasms., Disp: 60 tablet, Rfl: 2    Return in about 3 months (around 10/7/2022), or if symptoms worsen or fail to improve, for Recheck.

## 2022-09-07 ENCOUNTER — HOSPITAL ENCOUNTER (EMERGENCY)
Facility: HOSPITAL | Age: 57
Discharge: HOME | End: 2022-09-08
Attending: STUDENT IN AN ORGANIZED HEALTH CARE EDUCATION/TRAINING PROGRAM | Admitting: STUDENT IN AN ORGANIZED HEALTH CARE EDUCATION/TRAINING PROGRAM

## 2022-09-07 DIAGNOSIS — Z86.79 HISTORY OF HYPERTENSION: ICD-10-CM

## 2022-09-07 DIAGNOSIS — Z87.09 HISTORY OF EMPHYSEMA: ICD-10-CM

## 2022-09-07 DIAGNOSIS — S09.90XA INJURY OF HEAD, INITIAL ENCOUNTER: ICD-10-CM

## 2022-09-07 DIAGNOSIS — F10.920 ACUTE ALCOHOLIC INTOXICATION WITHOUT COMPLICATION: ICD-10-CM
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
## 2022-09-07 DIAGNOSIS — F17.200 TOBACCO DEPENDENCE: ICD-10-CM

## 2022-09-07 DIAGNOSIS — Z87.39 HISTORY OF SCOLIOSIS: ICD-10-CM

## 2022-09-07 DIAGNOSIS — S16.1XXA ACUTE CERVICAL MYOFASCIAL STRAIN, INITIAL ENCOUNTER: ICD-10-CM

## 2022-09-07 PROCEDURE — 99283 EMERGENCY DEPT VISIT LOW MDM: CPT

## 2022-09-08 ENCOUNTER — APPOINTMENT (OUTPATIENT)
Dept: CT IMAGING | Facility: HOSPITAL | Age: 57
End: 2022-09-08

## 2022-09-08 VITALS
HEART RATE: 76 BPM | DIASTOLIC BLOOD PRESSURE: 89 MMHG | HEIGHT: 65 IN | SYSTOLIC BLOOD PRESSURE: 152 MMHG | RESPIRATION RATE: 18 BRPM | OXYGEN SATURATION: 94 % | WEIGHT: 114 LBS | BODY MASS INDEX: 18.99 KG/M2 | TEMPERATURE: 98.8 F

## 2022-09-08 PROCEDURE — 72125 CT NECK SPINE W/O DYE: CPT

## 2022-09-08 PROCEDURE — 70450 CT HEAD/BRAIN W/O DYE: CPT

## 2022-09-08 RX ORDER — ACETAMINOPHEN 500 MG
1000 TABLET ORAL ONCE
Status: COMPLETED | OUTPATIENT
Start: 2022-09-08 | End: 2022-09-08

## 2022-09-08 RX ADMIN — ACETAMINOPHEN 1000 MG: 500 TABLET, FILM COATED ORAL at 01:10
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
## 2022-09-08 NOTE — DISCHARGE INSTRUCTIONS
ER evaluation revealed normal CT of the brain and C-spine without contrast.  There was no evidence of acute intracranial abnormality and no evidence of skull fracture.  Vital signs and exam are stable.  We will give head injury instructions.  Recommend increase fluids, rest, and Tylenol every 4-6 hours as needed for headache.  Recommend close PCP follow-up for recheck with Dr. Perdomo within 24 to 48 hours.  Continue with all other current medical management.  Return to the ER if worsening symptoms.

## 2022-09-28 ENCOUNTER — TELEPHONE (OUTPATIENT)
Dept: INTERNAL MEDICINE | Facility: CLINIC | Age: 57
End: 2022-09-28

## 2022-09-28 NOTE — TELEPHONE ENCOUNTER
Caller: Sera Guidry    Relationship: Self    Best call back number: [phone]    What is the best time to reach you: ANY TIME    Do you know the name of the person who called: NO    What was the call regarding: PATIENT GOT A CALL TODAY 09.28.22 NO MESSAGE WAS LEFT    Do you require a callback: YES PATIENT GIVES PERMISSION TO LEAVE A VOICEMAIL IF UNABLE TO ANSWER.

## 2022-09-28 NOTE — TELEPHONE ENCOUNTER
HUB:  ACCORDING TO THE PREVIOUS MESSAGE, THEY WERE CALLING HER TO RESCHEDULE HER 10/7 APPOINTMENT BECAUSE PROVIDER IS OUT ON THAT DAY.  WHEN SHE CALLS BACK, PLEASE RESCHEDULE THE APPOINTMENT.    Tried to call to let her know that it looks like someone called her to reschedule an appointment since provider will be out.

## 2022-09-29 ENCOUNTER — FLU SHOT (OUTPATIENT)
Dept: INTERNAL MEDICINE | Facility: CLINIC | Age: 57
End: 2022-09-29

## 2022-09-29 DIAGNOSIS — Z23 NEED FOR COVID-19 VACCINE: Primary | ICD-10-CM

## 2022-09-29 DIAGNOSIS — Z23 INFLUENZA VACCINE ADMINISTERED: ICD-10-CM

## 2022-09-29 DIAGNOSIS — Z23 COVID-19 VACCINE ADMINISTERED: Primary | ICD-10-CM

## 2022-09-29 PROCEDURE — 90686 IIV4 VACC NO PRSV 0.5 ML IM: CPT | Performed by: FAMILY MEDICINE

## 2022-09-29 PROCEDURE — 91312 COVID-19 (PFIZER) BIVALENT BOOSTER 12+YRS: CPT | Performed by: FAMILY MEDICINE

## 2022-09-29 PROCEDURE — 90471 IMMUNIZATION ADMIN: CPT | Performed by: FAMILY MEDICINE

## 2022-09-29 PROCEDURE — 0124A COVID-19 (PFIZER) BIVALENT BOOSTER 12+YRS: CPT | Performed by: FAMILY MEDICINE

## 2022-11-10 ENCOUNTER — OFFICE VISIT (OUTPATIENT)
Dept: INTERNAL MEDICINE | Facility: CLINIC | Age: 57
End: 2022-11-10

## 2022-11-10 VITALS
SYSTOLIC BLOOD PRESSURE: 124 MMHG | WEIGHT: 109 LBS | OXYGEN SATURATION: 91 % | HEART RATE: 59 BPM | DIASTOLIC BLOOD PRESSURE: 78 MMHG | TEMPERATURE: 97.8 F | BODY MASS INDEX: 17.52 KG/M2 | HEIGHT: 66 IN

## 2022-11-10 DIAGNOSIS — I10 ESSENTIAL HYPERTENSION: Chronic | ICD-10-CM

## 2022-11-10 DIAGNOSIS — F41.9 ANXIETY: ICD-10-CM

## 2022-11-10 DIAGNOSIS — J43.2 CENTRILOBULAR EMPHYSEMA: Primary | ICD-10-CM

## 2022-11-10 DIAGNOSIS — R63.4 WEIGHT LOSS, UNINTENTIONAL: ICD-10-CM

## 2022-11-10 DIAGNOSIS — Z12.11 SCREENING FOR COLON CANCER: ICD-10-CM

## 2022-11-10 DIAGNOSIS — E78.49 OTHER HYPERLIPIDEMIA: ICD-10-CM

## 2022-11-10 DIAGNOSIS — F33.41 RECURRENT MAJOR DEPRESSIVE DISORDER, IN PARTIAL REMISSION: ICD-10-CM

## 2022-11-10 DIAGNOSIS — K21.9 GASTROESOPHAGEAL REFLUX DISEASE: ICD-10-CM

## 2022-11-10 DIAGNOSIS — F17.210 CIGARETTE SMOKER: ICD-10-CM

## 2022-11-10 DIAGNOSIS — R07.2 PRECORDIAL PAIN: ICD-10-CM

## 2022-11-10 DIAGNOSIS — Z23 NEED FOR VACCINATION AGAINST STREPTOCOCCUS PNEUMONIAE: ICD-10-CM

## 2022-11-10 DIAGNOSIS — M62.830 BACK MUSCLE SPASM: ICD-10-CM

## 2022-11-10 DIAGNOSIS — R10.13 EPIGASTRIC PAIN: ICD-10-CM

## 2022-11-10 DIAGNOSIS — I73.9 PERIPHERAL VASCULAR DISEASE WITH CLAUDICATION: ICD-10-CM

## 2022-11-10 PROCEDURE — 99214 OFFICE O/P EST MOD 30 MIN: CPT | Performed by: FAMILY MEDICINE

## 2022-11-10 RX ORDER — CITALOPRAM 10 MG/1
10 TABLET ORAL DAILY
Qty: 30 TABLET | Refills: 3 | Status: SHIPPED | OUTPATIENT
Start: 2022-11-10

## 2022-11-10 RX ORDER — TIZANIDINE 4 MG/1
4 TABLET ORAL 2 TIMES DAILY PRN
Qty: 60 TABLET | Refills: 2 | Status: SHIPPED | OUTPATIENT
Start: 2022-11-10

## 2022-11-10 RX ORDER — OMEPRAZOLE 20 MG/1
20 CAPSULE, DELAYED RELEASE ORAL
Qty: 30 CAPSULE | Refills: 3 | Status: SHIPPED | OUTPATIENT
Start: 2022-11-10

## 2022-11-10 RX ORDER — NITROGLYCERIN 0.4 MG/1
0.4 TABLET SUBLINGUAL
Qty: 25 TABLET | Refills: 1 | Status: SHIPPED | OUTPATIENT
Start: 2022-11-10

## 2022-11-10 RX ORDER — BUSPIRONE HYDROCHLORIDE 15 MG/1
15 TABLET ORAL DAILY
Qty: 30 TABLET | Refills: 3 | Status: SHIPPED | OUTPATIENT
Start: 2022-11-10

## 2022-11-10 RX ORDER — METOPROLOL SUCCINATE 25 MG/1
TABLET, EXTENDED RELEASE ORAL
Qty: 15 TABLET | Refills: 3 | Status: SHIPPED | OUTPATIENT
Start: 2022-11-10

## 2022-11-10 RX ORDER — ATORVASTATIN CALCIUM 80 MG/1
80 TABLET, FILM COATED ORAL DAILY
Qty: 30 TABLET | Refills: 3 | Status: SHIPPED | OUTPATIENT
Start: 2022-11-10

## 2022-11-10 RX ORDER — BUPROPION HYDROCHLORIDE 150 MG/1
150 TABLET ORAL DAILY
Qty: 30 TABLET | Refills: 3 | Status: SHIPPED | OUTPATIENT
Start: 2022-11-10

## 2022-11-10 RX ORDER — LISINOPRIL 20 MG/1
20 TABLET ORAL DAILY
Qty: 30 TABLET | Refills: 3 | Status: SHIPPED | OUTPATIENT
Start: 2022-11-10

## 2022-11-10 NOTE — PROGRESS NOTES
"Subjective   Sera Guidry is a 56 y.o. female.     Chief Complaint   Patient presents with   • Hypertension   • Hyperlipidemia   • Anxiety   • Depression       Visit Vitals  /78   Pulse 59   Temp 97.8 °F (36.6 °C)   Ht 167.6 cm (66\")   Wt 49.4 kg (109 lb)   SpO2 91%   BMI 17.59 kg/m²       Wt Readings from Last 3 Encounters:   11/10/22 49.4 kg (109 lb)   09/07/22 51.7 kg (114 lb)   07/07/22 51.3 kg (113 lb)       Hypertension  This is a chronic problem. The current episode started more than 1 year ago. The problem is unchanged. The problem is controlled. Associated symptoms include anxiety and sweats (after drinking coffee). Pertinent negatives include no blurred vision, chest pain, headaches, malaise/fatigue, neck pain, orthopnea, palpitations, peripheral edema, PND or shortness of breath. There are no associated agents to hypertension. Risk factors for coronary artery disease include dyslipidemia, family history, post-menopausal state and smoking/tobacco exposure. Current antihypertension treatment includes beta blockers and ACE inhibitors. The current treatment provides significant improvement. Hypertensive end-organ damage includes angina and PVD. There is no history of kidney disease, CAD/MI, CVA, heart failure, left ventricular hypertrophy or retinopathy. There is no history of chronic renal disease, sleep apnea or a thyroid problem.   Hyperlipidemia  This is a chronic problem. The current episode started more than 1 year ago. The problem is controlled. Recent lipid tests were reviewed and are normal. She has no history of chronic renal disease, diabetes, hypothyroidism, liver disease, obesity or nephrotic syndrome. Factors aggravating her hyperlipidemia include beta blockers. Pertinent negatives include no chest pain, focal sensory loss, focal weakness, leg pain, myalgias or shortness of breath. Current antihyperlipidemic treatment includes statins. The current treatment provides significant " improvement of lipids. There are no compliance problems.  Risk factors for coronary artery disease include dyslipidemia, family history, hypertension and post-menopausal.   Anxiety  Presents for follow-up visit. Patient reports no chest pain, compulsions, confusion, decreased concentration, depressed mood, dizziness, dry mouth, excessive worry, feeling of choking, hyperventilation, insomnia, irritability, malaise, muscle tension, nausea, nervous/anxious behavior, obsessions, palpitations, panic, restlessness, shortness of breath or suicidal ideas. Symptoms occur rarely. The severity of symptoms is mild. The quality of sleep is good. Nighttime awakenings: none.     Her past medical history is significant for depression. Compliance with medications is %.   Depression  Visit Type: follow-up  Patient presents with the following symptoms: weight loss.  Patient is not experiencing: anhedonia, chest pain, choking sensation, compulsions, confusion, decreased concentration, depressed mood, dizziness, dry mouth, excessive worry, fatigue, feelings of hopelessness, feelings of worthlessness, hypersomnia, hyperventilation, insomnia, irritability, malaise, memory impairment, muscle tension, nausea, nervousness/anxiety, obsessions, palpitations, panic, psychomotor agitation, psychomotor retardation, restlessness, shortness of breath, suicidal ideas, suicidal planning, thoughts of death and weight gain.  Frequency of symptoms: rarely   Severity: mild   Sleep quality: good  Nighttime awakenings: none  Compliance with medications:  %        Weight Loss  This is a chronic problem. The current episode started more than 1 month ago. The problem occurs constantly. The problem has been unchanged. Associated symptoms include diaphoresis (at night). Pertinent negatives include no abdominal pain, anorexia, arthralgias, change in bowel habit, chest pain, chills, congestion, coughing, fatigue, fever, headaches, joint swelling,  myalgias, nausea, neck pain, numbness, rash, sore throat, swollen glands, urinary symptoms, vertigo, visual change, vomiting or weakness. Nothing aggravates the symptoms. She has tried nothing for the symptoms. The treatment provided no relief.      Pt has lost weight.  Pt is working for [employer] instead of [employer].  Pt will have a sandwich, chips an soda for lunch daily.  Pt will have eggs or toast for breakfast.  Supper is chicken nuggets or pork chops mashed potatoes and gray and vegetables. Pt is not eating junk food/candy.  Diet has not changed this yr. Work is less stressful this yr.   Pt is drinking 3 beers per day after work.   Pt goes to work at 10 am and goes to bed at 10 pm. Pt gets up at 5 am instead of 3 am when she worked at  [employer]    Pt has COPD that is stable. .   The following portions of the patient's history were reviewed and updated as appropriate: allergies, current medications, past family history, past medical history, past social history, past surgical history and problem list.    Past Medical History:   Diagnosis Date   • Allergy    • Anxiety    • Asthma    • Centrilobular emphysema (HCC) 4/8/2022   • Cervical cancer (HCC)    • Chest pain    • COPD (chronic obstructive pulmonary disease) (HCC)    • Depression    • Endometriosis    • GERD (gastroesophageal reflux disease)    • H/O echocardiogram 04/17/2018    EF 60% mild TR   • Heart murmur    • Hypertension    • Osteoarthritis    • Other hyperlipidemia 11/27/2018   • Ovarian cancer (HCC)     endometrial/cervical   • Ovarian cyst    • Scoliosis       Past Surgical History:   Procedure Laterality Date   • APPENDECTOMY     • CARDIAC CATHETERIZATION N/A 4/1/2020    Procedure: Coronary angiography;  Surgeon: Chacorta Mohan MD;  Location[facility] CATH INVASIVE LOCATION;  Service: Cardiology;  Laterality: N/A;   • COLONOSCOPY  09/11/2018    Dr Patrick   • HYSTERECTOMY     • ORIF WRIST FRACTURE Left 09/2017    with 2 plates   • TOTAL  ABDOMINAL HYSTERECTOMY WITH SALPINGO OOPHORECTOMY      ovaries removed at 2 separate occcasions      Family History   Problem Relation Age of Onset   • Allergies Other    • ADD / ADHD Other    • Heart disease Other         cardiac disorder   • Stroke Other    • Depression Other    • Hypertension Other    • Lung cancer Other    • Uterine cancer Other    • Ovarian cancer Other    • Cancer Mother         left lung   • Stroke Mother         x 2   • Stroke Father         x 3   • Heart disease Father 55        heart attack   • No Known Problems Son    • No Known Problems Son    • No Known Problems Son    • No Known Problems Brother    • Ovarian cancer Maternal Aunt 45   • Breast cancer Neg Hx       Social History     Socioeconomic History   • Marital status:    • Number of children: 3   Tobacco Use   • Smoking status: Every Day     Packs/day: 0.25     Years: 40.00     Pack years: 10.00     Types: Cigarettes   • Smokeless tobacco: Never   Substance and Sexual Activity   • Alcohol use: Yes     Alcohol/week: 14.0 standard drinks     Types: 14 Cans of beer per week   • Drug use: No     Comment: quit MJ   • Sexual activity: Defer      Allergies   Allergen Reactions   • Penicillins Hives       Review of Systems   Constitutional: Positive for diaphoresis (at night) and weight loss. Negative for chills, fatigue, fever, irritability, malaise/fatigue and weight gain.   HENT: Negative for congestion and sore throat.    Eyes: Negative for blurred vision.   Respiratory: Negative for cough, choking and shortness of breath.    Cardiovascular: Negative for chest pain, palpitations, orthopnea and PND.   Gastrointestinal: Negative for abdominal pain, anorexia, change in bowel habit, nausea and vomiting.   Musculoskeletal: Negative for arthralgias, joint swelling, myalgias and neck pain.   Skin: Negative for rash.   Neurological: Negative for dizziness, vertigo, focal weakness, weakness, numbness and headaches.    Psychiatric/Behavioral: Negative for confusion, decreased concentration and suicidal ideas. The patient is not nervous/anxious and does not have insomnia.        Objective   Physical Exam  Vitals and nursing note reviewed.   Constitutional:       Appearance: She is well-developed.   HENT:      Head: Normocephalic.      Right Ear: External ear normal.      Left Ear: External ear normal.      Nose: Nose normal.   Eyes:      General: Lids are normal.      Conjunctiva/sclera: Conjunctivae normal.      Pupils: Pupils are equal, round, and reactive to light.   Neck:      Thyroid: No thyroid mass or thyromegaly.      Vascular: No carotid bruit.      Trachea: Trachea normal.   Cardiovascular:      Rate and Rhythm: Normal rate and regular rhythm.      Heart sounds: No murmur heard.  Pulmonary:      Effort: Pulmonary effort is normal. No respiratory distress.      Breath sounds: Normal breath sounds. No decreased breath sounds, wheezing, rhonchi or rales.   Chest:      Chest wall: No tenderness.   Abdominal:      General: Bowel sounds are normal.      Palpations: Abdomen is soft. There is no hepatomegaly or splenomegaly.      Tenderness: There is abdominal tenderness in the epigastric area. There is no guarding or rebound.      Hernia: No hernia is present.   Musculoskeletal:         General: Normal range of motion.      Cervical back: Normal range of motion and neck supple.   Skin:     General: Skin is warm and dry.   Neurological:      Mental Status: She is alert and oriented to person, place, and time.   Psychiatric:         Behavior: Behavior normal.         Assessment & Plan   Diagnoses and all orders for this visit:    1. Centrilobular emphysema (HCC) (Primary)    2. Back muscle spasm  -     tiZANidine (ZANAFLEX) 4 MG tablet; Take 1 tablet by mouth 2 (Two) Times a Day As Needed for Muscle Spasms.  Dispense: 60 tablet; Refill: 2    3. Essential hypertension  -     metoprolol succinate XL (TOPROL-XL) 25 MG 24 hr tablet;  1/2 tablet po qd  Dispense: 15 tablet; Refill: 3  -     lisinopril (PRINIVIL,ZESTRIL) 20 MG tablet; Take 1 tablet by mouth Daily.  Dispense: 30 tablet; Refill: 3  -     Comprehensive Metabolic Panel; Future  -     TSH; Future  -     Lipid Panel; Future  -     CBC & Differential; Future    4. Precordial pain  -     nitroglycerin (NITROSTAT) 0.4 MG SL tablet; Place 1 tablet under the tongue Every 5 (Five) Minutes As Needed for Chest Pain. Take no more than 3 doses in 15 minutes.  Dispense: 25 tablet; Refill: 1    5. Recurrent major depressive disorder, in partial remission (HCC)  -     citalopram (CeleXA) 10 MG tablet; Take 1 tablet by mouth Daily.  Dispense: 30 tablet; Refill: 3  -     buPROPion XL (Wellbutrin XL) 150 MG 24 hr tablet; Take 1 tablet by mouth Daily.  Dispense: 30 tablet; Refill: 3    6. Anxiety  -     busPIRone (BUSPAR) 15 MG tablet; Take 1 tablet by mouth Daily.  Dispense: 30 tablet; Refill: 3    7. Gastroesophageal reflux disease  -     omeprazole (PrilOSEC) 20 MG capsule; Take 1 capsule by mouth every night at bedtime.  Dispense: 30 capsule; Refill: 3    8. Other hyperlipidemia  -     atorvastatin (LIPITOR) 80 MG tablet; Take 1 tablet by mouth Daily.  Dispense: 30 tablet; Refill: 3  -     Comprehensive Metabolic Panel; Future  -     Lipid Panel; Future    9. Peripheral vascular disease with claudication (HCC)  -     atorvastatin (LIPITOR) 80 MG tablet; Take 1 tablet by mouth Daily.  Dispense: 30 tablet; Refill: 3    10. Cigarette smoker  -     CT chest low dose wo; Future    11. Weight loss, unintentional  -     POC Occult Blood X 3, Stool; Future  -     TSH; Future  -     T4, Free; Future  -     Amylase; Future  -     Lipase; Future  -     QuantiFERON TB Plus Client Incubated; Future  -     US Abdomen Complete; Future    12. Screening for colon cancer  -     POC Occult Blood X 3, Stool; Future    13. Need for vaccination against Streptococcus pneumoniae  -     Cancel: Pneumococcal Conjugate Vaccine  20-Valent (PCV20)  -     Pneumococcal Conjugate Vaccine 20-Valent (PCV20); Future    14. Epigastric pain  -     US Abdomen Complete; Future      Pt has enough inhalers for her COPD    Pt will get pneumonia vaccine at later date         Current Outpatient Medications:   •  albuterol sulfate  (90 Base) MCG/ACT inhaler, Inhale 2 puffs Every 4 (Four) Hours As Needed for Wheezing or Shortness of Air., Disp: 18 g, Rfl: 3  •  aspirin 81 MG EC tablet, Take 1 tablet by mouth Daily., Disp: 30 tablet, Rfl: 0  •  atorvastatin (LIPITOR) 80 MG tablet, Take 1 tablet by mouth Daily., Disp: 30 tablet, Rfl: 3  •  buPROPion XL (Wellbutrin XL) 150 MG 24 hr tablet, Take 1 tablet by mouth Daily., Disp: 30 tablet, Rfl: 3  •  busPIRone (BUSPAR) 15 MG tablet, Take 1 tablet by mouth Daily., Disp: 30 tablet, Rfl: 3  •  citalopram (CeleXA) 10 MG tablet, Take 1 tablet by mouth Daily., Disp: 30 tablet, Rfl: 3  •  lisinopril (PRINIVIL,ZESTRIL) 20 MG tablet, Take 1 tablet by mouth Daily., Disp: 30 tablet, Rfl: 3  •  metoprolol succinate XL (TOPROL-XL) 25 MG 24 hr tablet, 1/2 tablet po qd, Disp: 15 tablet, Rfl: 3  •  Multiple Vitamins-Minerals (MULTIVITAMIN WOMEN PO), Take 1 tablet by mouth Daily., Disp: , Rfl:   •  nitroglycerin (NITROSTAT) 0.4 MG SL tablet, Place 1 tablet under the tongue Every 5 (Five) Minutes As Needed for Chest Pain. Take no more than 3 doses in 15 minutes., Disp: 25 tablet, Rfl: 1  •  omeprazole (PrilOSEC) 20 MG capsule, Take 1 capsule by mouth every night at bedtime., Disp: 30 capsule, Rfl: 3  •  tiZANidine (ZANAFLEX) 4 MG tablet, Take 1 tablet by mouth 2 (Two) Times a Day As Needed for Muscle Spasms., Disp: 60 tablet, Rfl: 2  •  vitamin B-12 (CYANOCOBALAMIN) 500 MCG tablet, Take 500 mcg by mouth Daily., Disp: , Rfl:   •  vitamin E 200 UNIT capsule, Take 200 Units by mouth Daily., Disp: , Rfl:     Return in about 4 weeks (around 12/8/2022), or if symptoms worsen or fail to improve, for Recheck weight loss.

## 2022-11-29 ENCOUNTER — APPOINTMENT (OUTPATIENT)
Dept: CT IMAGING | Facility: HOSPITAL | Age: 57
End: 2022-11-29

## 2022-11-29 ENCOUNTER — APPOINTMENT (OUTPATIENT)
Dept: ULTRASOUND IMAGING | Facility: HOSPITAL | Age: 57
End: 2022-11-29

## 2023-04-05 ENCOUNTER — TELEPHONE (OUTPATIENT)
Dept: INTERNAL MEDICINE | Facility: CLINIC | Age: 58
End: 2023-04-05
Payer: COMMERCIAL

## 2023-04-05 DIAGNOSIS — Z12.11 SCREENING FOR COLON CANCER: Primary | ICD-10-CM

## 2023-04-05 DIAGNOSIS — R63.4 WEIGHT LOSS, UNINTENTIONAL: ICD-10-CM

## 2023-04-28 ENCOUNTER — TELEPHONE (OUTPATIENT)
Dept: INTERNAL MEDICINE | Facility: CLINIC | Age: 58
End: 2023-04-28

## 2023-04-28 DIAGNOSIS — J44.9 CHRONIC OBSTRUCTIVE PULMONARY DISEASE, UNSPECIFIED COPD TYPE: Chronic | ICD-10-CM

## 2023-04-28 RX ORDER — ALBUTEROL SULFATE 90 UG/1
2 AEROSOL, METERED RESPIRATORY (INHALATION) EVERY 4 HOURS PRN
Qty: 18 G | Refills: 3 | Status: SHIPPED | OUTPATIENT
Start: 2023-04-28

## 2023-04-28 NOTE — TELEPHONE ENCOUNTER
Caller: Sera Guidry    Relationship: Self    Best call back number: [phone]    Requested Prescriptions:   [facility]     Pharmacy where request should be sent: [facility] [id number] - [place], [place] - [facility] [address] - [phone] PH - [phone] FX     Last office visit with prescribing clinician: 11/10/2022   Last telemedicine visit with prescribing clinician: Visit date not found   Next office visit with prescribing clinician: Visit date not found     Additional details provided by patient: NA    Does the patient have less than a 3 day supply:  [] Yes  [] No    Would you like a call back once the refill request has been completed: [] Yes [] No    If the office needs to give you a call back, can they leave a voicemail: [] Yes [] No    Marry Arriaza Rep   04/28/23 14:23 EDT

## 2023-04-28 NOTE — TELEPHONE ENCOUNTER
PT HAS APPT WITH DR. SAMANIEGO ON MAY 2, 2023.     I DO NOT SEE THIS MEDICATION ON THE LIST.     PLEASE ADVISE.

## 2023-05-01 DIAGNOSIS — J44.9 CHRONIC OBSTRUCTIVE PULMONARY DISEASE, UNSPECIFIED COPD TYPE: Chronic | ICD-10-CM

## 2023-05-01 RX ORDER — ALBUTEROL SULFATE 90 UG/1
2 AEROSOL, METERED RESPIRATORY (INHALATION) EVERY 4 HOURS PRN
Qty: 18 G | Refills: 3 | OUTPATIENT
Start: 2023-05-01

## 2023-05-01 RX ORDER — ALBUTEROL SULFATE 90 UG/1
AEROSOL, METERED RESPIRATORY (INHALATION)
Qty: 18 G | Refills: 3 | OUTPATIENT
Start: 2023-05-01

## 2023-05-01 NOTE — TELEPHONE ENCOUNTER
Caller: Sera Guidry    Relationship: Self    Best call back number: [phone]    Requested Prescriptions:   Requested Prescriptions     Pending Prescriptions Disp Refills   • albuterol sulfate  (90 Base) MCG/ACT inhaler 18 g 3     Sig: Inhale 2 puffs Every 4 (Four) Hours As Needed for Wheezing or Shortness of Air.        Pharmacy where request should be sent: [facility] [id number] [address] - [phone]  - [phone] FX     Last office visit with prescribing clinician: 11/10/2022   Last telemedicine visit with prescribing clinician: 5/2/2023   Next office visit with prescribing clinician: Visit date not found     Additional details provided by patient: PATIENT HAS CALLED REQUESTING A NEW PRESCRIPTION ON ABOVE MEDICATION    Does the patient have less than a 3 day supply:  [x] Yes  [] No    Would you like a call back once the refill request has been completed: [x] Yes [] No    If the office needs to give you a call back, can they leave a voicemail: [x] Yes [] No    Babak Licona   05/01/23 09:58 EDT

## 2023-05-02 ENCOUNTER — OFFICE VISIT (OUTPATIENT)
Dept: INTERNAL MEDICINE | Facility: CLINIC | Age: 58
End: 2023-05-02
Payer: COMMERCIAL

## 2023-05-02 VITALS
OXYGEN SATURATION: 97 % | SYSTOLIC BLOOD PRESSURE: 150 MMHG | TEMPERATURE: 97.7 F | DIASTOLIC BLOOD PRESSURE: 92 MMHG | RESPIRATION RATE: 24 BRPM | HEIGHT: 65 IN | HEART RATE: 65 BPM | WEIGHT: 102.3 LBS | BODY MASS INDEX: 17.04 KG/M2

## 2023-05-02 DIAGNOSIS — I10 ESSENTIAL HYPERTENSION: Primary | Chronic | ICD-10-CM

## 2023-05-02 DIAGNOSIS — M79.604 PAIN IN BOTH LOWER EXTREMITIES: ICD-10-CM

## 2023-05-02 DIAGNOSIS — G25.81 RLS (RESTLESS LEGS SYNDROME): ICD-10-CM

## 2023-05-02 DIAGNOSIS — M79.605 PAIN IN BOTH LOWER EXTREMITIES: ICD-10-CM

## 2023-05-02 PROCEDURE — 99214 OFFICE O/P EST MOD 30 MIN: CPT | Performed by: FAMILY MEDICINE

## 2023-05-02 PROCEDURE — 3077F SYST BP >= 140 MM HG: CPT | Performed by: FAMILY MEDICINE

## 2023-05-02 PROCEDURE — 1159F MED LIST DOCD IN RCRD: CPT | Performed by: FAMILY MEDICINE

## 2023-05-02 PROCEDURE — 3080F DIAST BP >= 90 MM HG: CPT | Performed by: FAMILY MEDICINE

## 2023-05-02 PROCEDURE — 1160F RVW MEDS BY RX/DR IN RCRD: CPT | Performed by: FAMILY MEDICINE

## 2023-05-02 RX ORDER — ROPINIROLE 2 MG/1
2 TABLET, FILM COATED ORAL NIGHTLY
Qty: 30 TABLET | Refills: 2 | Status: SHIPPED | OUTPATIENT
Start: 2023-05-02

## 2023-05-02 RX ORDER — LISINOPRIL 40 MG/1
40 TABLET ORAL DAILY
Qty: 30 TABLET | Refills: 2 | Status: SHIPPED | OUTPATIENT
Start: 2023-05-02

## 2023-05-02 NOTE — PROGRESS NOTES
"    Office Note     Name: Sera Guidry    : 1965     MRN: [id number]     Chief Complaint  Leg Pain (X 2 months-taking tizanidine not helping. Feels like pins and needles. ) and Hypertension (Taking lisinopril for awhile and it doesn't seem  to be helping. )    Subjective     History of Present Illness:  Sera Guidry is a 57 y.o. female who presents today for several concerns.  She states that she has been on lisinopril 20 mg for some time and her blood pressure remains elevated.  She gets readings in the 150s and 160s systolic and usually high 90s diastolic.  She also states that the tizanidine is not helping with her leg problems.  She has cramping in her legs mainly from the knees down and sometimes tingling or a strange sensation and its mainly at night when she is trying to sleep and she has to get up and walk around to relieve the discomfort usually.  It bothers her when she lays down to go to sleep mainly.    Review of Systems   Respiratory: Negative for cough, shortness of breath and wheezing.    Cardiovascular: Negative for chest pain and palpitations.   Gastrointestinal: Negative for blood in stool, diarrhea and vomiting.   Genitourinary: Negative for dysuria, flank pain and genital sores.       Objective     Vital Signs  /92 (Cuff Size: Adult)   Pulse 65   Temp 97.7 °F (36.5 °C) (Infrared)   Resp 24   Ht 165.1 cm (65\")   Wt 46.4 kg (102 lb 4.8 oz)   SpO2 97%   BMI 17.02 kg/m²   Estimated body mass index is 17.02 kg/m² as calculated from the following:    Height as of this encounter: 165.1 cm (65\").    Weight as of this encounter: 46.4 kg (102 lb 4.8 oz).          Physical Exam  Vitals and nursing note reviewed.   HENT:      Head: Normocephalic and atraumatic.   Neck:      Vascular: No carotid bruit.   Cardiovascular:      Rate and Rhythm: Normal rate and regular rhythm.      Heart sounds: Normal heart sounds. No murmur heard.    No gallop.   Pulmonary:      Effort: " Pulmonary effort is normal. No respiratory distress.      Breath sounds: No stridor. No wheezing, rhonchi or rales.   Musculoskeletal:      Cervical back: Normal range of motion and neck supple.      Right lower leg: No edema.      Left lower leg: No edema.   Lymphadenopathy:      Cervical: No cervical adenopathy.   Neurological:      Mental Status: She is alert.                 Assessment and Plan     Diagnoses and all orders for this visit:    1. Essential hypertension (Primary) increase lisinopril from 20 mg to 40 mg and follow-up with Dr. Perdomo in 2 weeks with blood pressure and pulse log  -     lisinopril (PRINIVIL,ZESTRIL) 40 MG tablet; Take 1 tablet by mouth Daily.  Dispense: 30 tablet; Refill: 2    2. RLS (restless legs syndrome) start Requip prescription 2 mg nightly and follow-up with Dr. Perdomo in 2 weeks and discussed side effects and risks.  -     rOPINIRole (REQUIP) 2 MG tablet; Take 1 tablet by mouth Every Night.  Dispense: 30 tablet; Refill: 2    3. Leg pain--stop zanaflex and start requip rx and discussed risks and side effects      Follow Up  Return in about 2 weeks (around 5/16/2023) for F/U with Dr. Perdomo.    Sarah Beth Pierce DO

## 2023-05-26 ENCOUNTER — TELEPHONE (OUTPATIENT)
Dept: INTERNAL MEDICINE | Facility: CLINIC | Age: 58
End: 2023-05-26

## 2023-05-26 NOTE — TELEPHONE ENCOUNTER
Patient went to have dental work done today and her BP was high (197/108).  They did not do procedure and advised her to follow up with PCP.   took her BP (with BP cuff) when she got home and it read 137/80.  Was unable to get patient in with PCP but scheduled first available (45 minute time slot) with Dr Pierce on 6/2.  Patient just wanted to make sure PCP was aware of what was going on.  Patient stated that Dr Pierce increased her lisinopril last time she saw her.  She was OK to see Stan but wanted to make sure Dr. Perdomo was aware.

## 2023-07-07 ENCOUNTER — TELEPHONE (OUTPATIENT)
Dept: INTERNAL MEDICINE | Facility: CLINIC | Age: 58
End: 2023-07-07

## 2023-07-07 NOTE — TELEPHONE ENCOUNTER
Caller: Floyd Guidry    Relationship: Emergency Contact    Best call back number:     What is the best time to reach you: ANYTIME    Who are you requesting to speak with (clinical staff, provider,  specific staff member): CLINICAL STAFF    Do you know the name of the person who called: FLOYD    What was the call regarding: PATIENTS SPOUSE IS CONCERNED AS PATIENTS BLOOD PRESSURE HAS BEEN RUNNING HIGH;  HE ADVISED SHE WAS NOT AVAILABLE WHEN HE MADE THE CALL, BUT STATED HE WOULD MAKE SURE SHE KEEPS HER APPTS, AND TAKES HER MEDICATIONS

## 2023-07-31 ENCOUNTER — OFFICE VISIT (OUTPATIENT)
Dept: INTERNAL MEDICINE | Facility: CLINIC | Age: 58
End: 2023-07-31
Payer: COMMERCIAL

## 2023-07-31 VITALS
OXYGEN SATURATION: 94 % | DIASTOLIC BLOOD PRESSURE: 74 MMHG | HEIGHT: 65 IN | SYSTOLIC BLOOD PRESSURE: 128 MMHG | WEIGHT: 99 LBS | TEMPERATURE: 97.7 F | BODY MASS INDEX: 16.5 KG/M2 | HEART RATE: 67 BPM

## 2023-07-31 DIAGNOSIS — E78.49 OTHER HYPERLIPIDEMIA: ICD-10-CM

## 2023-07-31 DIAGNOSIS — G25.81 RLS (RESTLESS LEGS SYNDROME): ICD-10-CM

## 2023-07-31 DIAGNOSIS — F41.9 ANXIETY: ICD-10-CM

## 2023-07-31 DIAGNOSIS — R63.4 WEIGHT LOSS, UNINTENTIONAL: ICD-10-CM

## 2023-07-31 DIAGNOSIS — F17.210 CIGARETTE SMOKER: ICD-10-CM

## 2023-07-31 DIAGNOSIS — I73.9 PERIPHERAL VASCULAR DISEASE WITH CLAUDICATION: ICD-10-CM

## 2023-07-31 DIAGNOSIS — F33.41 RECURRENT MAJOR DEPRESSIVE DISORDER, IN PARTIAL REMISSION: ICD-10-CM

## 2023-07-31 DIAGNOSIS — I10 ESSENTIAL HYPERTENSION: Primary | Chronic | ICD-10-CM

## 2023-07-31 PROCEDURE — 1159F MED LIST DOCD IN RCRD: CPT | Performed by: FAMILY MEDICINE

## 2023-07-31 PROCEDURE — 3074F SYST BP LT 130 MM HG: CPT | Performed by: FAMILY MEDICINE

## 2023-07-31 PROCEDURE — 99214 OFFICE O/P EST MOD 30 MIN: CPT | Performed by: FAMILY MEDICINE

## 2023-07-31 PROCEDURE — 3078F DIAST BP <80 MM HG: CPT | Performed by: FAMILY MEDICINE

## 2023-07-31 PROCEDURE — 1160F RVW MEDS BY RX/DR IN RCRD: CPT | Performed by: FAMILY MEDICINE

## 2023-07-31 RX ORDER — BUPROPION HYDROCHLORIDE 150 MG/1
150 TABLET ORAL DAILY
Qty: 30 TABLET | Refills: 3 | Status: SHIPPED | OUTPATIENT
Start: 2023-07-31

## 2023-07-31 RX ORDER — METOPROLOL SUCCINATE 25 MG/1
TABLET, EXTENDED RELEASE ORAL
Qty: 15 TABLET | Refills: 3 | Status: SHIPPED | OUTPATIENT
Start: 2023-07-31

## 2023-07-31 RX ORDER — ATORVASTATIN CALCIUM 80 MG/1
80 TABLET, FILM COATED ORAL DAILY
Qty: 30 TABLET | Refills: 3 | Status: SHIPPED | OUTPATIENT
Start: 2023-07-31

## 2023-07-31 RX ORDER — BUSPIRONE HYDROCHLORIDE 15 MG/1
15 TABLET ORAL DAILY
Qty: 30 TABLET | Refills: 3 | Status: SHIPPED | OUTPATIENT
Start: 2023-07-31

## 2023-07-31 RX ORDER — LISINOPRIL 40 MG/1
40 TABLET ORAL DAILY
Qty: 30 TABLET | Refills: 2 | Status: SHIPPED | OUTPATIENT
Start: 2023-07-31

## 2023-07-31 RX ORDER — ROPINIROLE 2 MG/1
2 TABLET, FILM COATED ORAL NIGHTLY
Qty: 30 TABLET | Refills: 2 | Status: SHIPPED | OUTPATIENT
Start: 2023-07-31

## 2023-07-31 RX ORDER — CITALOPRAM HYDROBROMIDE 10 MG/1
10 TABLET ORAL DAILY
Qty: 30 TABLET | Refills: 3 | Status: SHIPPED | OUTPATIENT
Start: 2023-07-31

## 2025-03-13 ENCOUNTER — LAB (OUTPATIENT)
Dept: INTERNAL MEDICINE | Facility: CLINIC | Age: 60
End: 2025-03-13
Payer: COMMERCIAL

## 2025-03-13 ENCOUNTER — OFFICE VISIT (OUTPATIENT)
Dept: INTERNAL MEDICINE | Facility: CLINIC | Age: 60
End: 2025-03-13
Payer: COMMERCIAL

## 2025-03-13 VITALS
WEIGHT: 98 LBS | OXYGEN SATURATION: 99 % | TEMPERATURE: 98.2 F | HEART RATE: 69 BPM | DIASTOLIC BLOOD PRESSURE: 98 MMHG | SYSTOLIC BLOOD PRESSURE: 146 MMHG | BODY MASS INDEX: 16.33 KG/M2 | HEIGHT: 65 IN

## 2025-03-13 DIAGNOSIS — R07.2 PRECORDIAL PAIN: ICD-10-CM

## 2025-03-13 DIAGNOSIS — F41.9 ANXIETY: ICD-10-CM

## 2025-03-13 DIAGNOSIS — Z12.31 ENCOUNTER FOR SCREENING MAMMOGRAM FOR MALIGNANT NEOPLASM OF BREAST: ICD-10-CM

## 2025-03-13 DIAGNOSIS — I73.9 PERIPHERAL VASCULAR DISEASE WITH CLAUDICATION: ICD-10-CM

## 2025-03-13 DIAGNOSIS — I10 ESSENTIAL HYPERTENSION: Primary | ICD-10-CM

## 2025-03-13 DIAGNOSIS — F33.1 MODERATE EPISODE OF RECURRENT MAJOR DEPRESSIVE DISORDER: ICD-10-CM

## 2025-03-13 DIAGNOSIS — E78.49 OTHER HYPERLIPIDEMIA: ICD-10-CM

## 2025-03-13 DIAGNOSIS — K22.70 BARRETT'S ESOPHAGUS WITHOUT DYSPLASIA: ICD-10-CM

## 2025-03-13 DIAGNOSIS — J44.9 CHRONIC OBSTRUCTIVE PULMONARY DISEASE, UNSPECIFIED COPD TYPE: Chronic | ICD-10-CM

## 2025-03-13 DIAGNOSIS — K21.00 GASTROESOPHAGEAL REFLUX DISEASE WITH ESOPHAGITIS WITHOUT HEMORRHAGE: ICD-10-CM

## 2025-03-13 DIAGNOSIS — G25.81 RLS (RESTLESS LEGS SYNDROME): ICD-10-CM

## 2025-03-13 DIAGNOSIS — Z23 FLU VACCINE NEED: ICD-10-CM

## 2025-03-13 DIAGNOSIS — R13.10 DYSPHAGIA, UNSPECIFIED TYPE: ICD-10-CM

## 2025-03-13 DIAGNOSIS — E55.9 VITAMIN D DEFICIENCY: ICD-10-CM

## 2025-03-13 RX ORDER — LISINOPRIL 40 MG/1
40 TABLET ORAL DAILY
Qty: 30 TABLET | Refills: 3 | Status: SHIPPED | OUTPATIENT
Start: 2025-03-13

## 2025-03-13 RX ORDER — BUSPIRONE HYDROCHLORIDE 15 MG/1
15 TABLET ORAL DAILY
Qty: 30 TABLET | Refills: 3 | Status: SHIPPED | OUTPATIENT
Start: 2025-03-13

## 2025-03-13 RX ORDER — METOPROLOL SUCCINATE 25 MG/1
TABLET, EXTENDED RELEASE ORAL
Qty: 15 TABLET | Refills: 3 | Status: SHIPPED | OUTPATIENT
Start: 2025-03-13

## 2025-03-13 RX ORDER — BUPROPION HYDROCHLORIDE 150 MG/1
150 TABLET ORAL DAILY
Qty: 30 TABLET | Refills: 3 | Status: SHIPPED | OUTPATIENT
Start: 2025-03-13

## 2025-03-13 RX ORDER — CITALOPRAM HYDROBROMIDE 10 MG/1
10 TABLET ORAL DAILY
Qty: 30 TABLET | Refills: 3 | Status: SHIPPED | OUTPATIENT
Start: 2025-03-13

## 2025-03-13 RX ORDER — ESOMEPRAZOLE MAGNESIUM 40 MG/1
40 CAPSULE, DELAYED RELEASE ORAL DAILY
Qty: 30 CAPSULE | Refills: 3 | Status: SHIPPED | OUTPATIENT
Start: 2025-03-13

## 2025-03-13 RX ORDER — ROPINIROLE 2 MG/1
2 TABLET, FILM COATED ORAL NIGHTLY
Qty: 30 TABLET | Refills: 3 | Status: SHIPPED | OUTPATIENT
Start: 2025-03-13

## 2025-03-13 RX ORDER — ATORVASTATIN CALCIUM 80 MG/1
80 TABLET, FILM COATED ORAL DAILY
Qty: 30 TABLET | Refills: 3 | Status: SHIPPED | OUTPATIENT
Start: 2025-03-13

## 2025-03-13 RX ORDER — ALBUTEROL SULFATE 90 UG/1
2 INHALANT RESPIRATORY (INHALATION) EVERY 4 HOURS PRN
Qty: 18 G | Refills: 3 | Status: SHIPPED | OUTPATIENT
Start: 2025-03-13

## 2025-03-13 RX ORDER — NITROGLYCERIN 0.4 MG/1
0.4 TABLET SUBLINGUAL
Qty: 25 TABLET | Refills: 1 | Status: SHIPPED | OUTPATIENT
Start: 2025-03-13

## 2025-03-13 NOTE — PROGRESS NOTES
"Subjective   Sera Guidry is a 59 y.o. female.         Chief Complaint   Patient presents with    Anxiety     Pt has been out of meds for over a year due to no insurance.  Would like to restart all meds    Depression    Hyperlipidemia    Hypertension       Visit Vitals  /98 (BP Location: Left arm, Patient Position: Sitting, Cuff Size: Small Adult)   Pulse 69   Temp 98.2 °F (36.8 °C)   Ht 165.1 cm (65\")   Wt 44.5 kg (98 lb)   SpO2 99%   BMI 16.31 kg/m²       Wt Readings from Last 3 Encounters:   03/13/25 44.5 kg (98 lb)   07/31/23 44.9 kg (99 lb)   05/02/23 46.4 kg (102 lb 4.8 oz)         Anxiety  Presents for follow-up visit.  Symptoms include depressed mood, excessive worry, insomnia, irritability, nervous/anxious behavior, shortness of breath (occasional) and malaise/fatigue.  Patient reports no chest pain, compulsions, confusion, decreased concentration, dizziness, dry mouth, feeling of choking, hyperventilation, malaise, muscle tension, nausea, obsessions, palpitations, panic, restlessness or suicidal ideas. Symptoms occur most days. The severity of symptoms is moderate. The quality of sleep is poor. Awakens often during the night. Her past medical history is significant for depression. Past treatments include nothing. The treatment provided no relief. Prior compliance problems include insurance issues. Compliance with medications is 0-25%.   Depression  Presents for follow-up visit. Symptoms include depressed mood, excessive worry, insomnia, irritability, nervousness/anxiety, shortness of breath (occasional) and malaise/fatigue. Patient is not experiencing: choking sensation, compulsions, confusion, decreased concentration, dry mouth, feelings of hopelessness, feelings of worthlessness, hypersomnia, hyperventilation, malaise, muscle tension, obsessions, palpitations, panic, psychomotor agitation, psychomotor retardation, restlessness, suicidal ideas, suicidal planning, thoughts of death, weight " gain, weight loss, chest pain, feeling of choking, dizziness, nausea and difficulty controlling mood.Symptoms occur most days.  The severity of symptoms is severe.  The quality of sleep is poor. Awakens often during the night. Her past medical history is significant for depression. No history of: anemia Past treatments include nothing. The treatment provides no relief relief. Prior compliance problems include insurance issues. Compliance with medications is 0-25%.   Hyperlipidemia  This is a chronic problem. The current episode started more than 1 year ago. Condition status: pending. She has no history of chronic renal disease, diabetes, hypothyroidism, liver disease, obesity or nephrotic syndrome. Factors aggravating her hyperlipidemia include beta blockers. Associated symptoms include shortness of breath (occasional). Pertinent negatives include no chest pain. She is currently on no antihyperlipidemic treatment. The current treatment provides no improvement of lipids. Compliance problems include medication cost.  Risk factors for coronary artery disease include hypertension, post-menopausal, dyslipidemia and family history.   Hypertension  This is a chronic problem. The current episode started more than 1 year ago. The problem has been worse since onset. The problem is uncontrolled. Associated symptoms include anxiety, malaise/fatigue, neck pain and shortness of breath (occasional). Pertinent negatives include no blurred vision, chest pain, headaches, orthopnea, palpitations, peripheral edema, PND or sweats. There are no associated agents to hypertension. Risk factors for coronary artery disease include dyslipidemia, family history, post-menopausal state, smoking/tobacco exposure and stress. Past treatments include beta blockers and ACE inhibitors. Current antihypertension treatment includes nothing. The current treatment provides no improvement. Compliance problems include medication cost.  There is no history of  angina, kidney disease, CAD/MI, CVA, heart failure, left ventricular hypertrophy, PVD or retinopathy. There is no history of chronic renal disease, sleep apnea or a thyroid problem.   Heartburn  She complains of coughing and heartburn. She reports no abdominal pain, no belching, no chest pain, no choking, no dysphagia, no early satiety, no globus sensation, no hoarse voice, no nausea, no sore throat, no stridor, no tooth decay, no water brash or no wheezing. The current episode started more than 1 year ago. The problem occurs frequently. The problem has been unchanged. The heartburn duration is several minutes (20-30 minutes). The heartburn is of moderate intensity. The heartburn does not wake her from sleep. The heartburn limits her activity. The heartburn changes with position. The symptoms are aggravated by certain foods. Pertinent negatives include no anemia, fatigue, melena, muscle weakness, orthopnea or weight loss. Risk factors include smoking/tobacco exposure, hiatal hernia, Agudelo's esophagus and ETOH use. She has tried nothing for the symptoms. The treatment provided no relief. Past procedures include an EGD.      Pt has been out of all her medications for over a year.   Pt has restless leg syndrome and legs are aching.     Pt has food catching in upper esophagus. Pt has been Dx'd with Barretts in the past.   The following portions of the patient's history were reviewed and updated as appropriate: allergies, current medications, past family history, past medical history, past social history, past surgical history, and problem list.    Past Medical History:   Diagnosis Date    Adenomatous colon polyp 06/27/2023    Dr Patrick, 5 yr f/u    Allergy     Anxiety     Asthma     Agudelo's esophagus 06/27/2023    Centrilobular emphysema 04/08/2022    Cervical cancer     Chest pain     COPD (chronic obstructive pulmonary disease)     Depression     Endometriosis     GERD (gastroesophageal reflux disease)     H/O  echocardiogram 04/17/2018    EF 60% mild TR    Heart murmur     Hypertension     Osteoarthritis     Other hyperlipidemia 11/27/2018    Ovarian cancer     endometrial/cervical    Ovarian cyst     Scoliosis       Past Surgical History:   Procedure Laterality Date    APPENDECTOMY      CARDIAC CATHETERIZATION N/A 04/01/2020    Procedure: Coronary angiography;  Surgeon: Chacorta Mohan MD;  Location: [facility] CATH INVASIVE LOCATION;  Service: Cardiology;  Laterality: N/A;    COLONOSCOPY  09/11/2018    Dr Patrick    COLONOSCOPY W/ POLYPECTOMY  06/27/2023    Dr Patrick, adenomatous, 5 yr f/u    HYSTERECTOMY      ORIF WRIST FRACTURE Left 09/2017    with 2 plates    TOTAL ABDOMINAL HYSTERECTOMY WITH SALPINGO OOPHORECTOMY      ovaries removed at 2 separate occcasions    UPPER GASTROINTESTINAL ENDOSCOPY  06/27/2023    Dr Patrick      Family History   Problem Relation Age of Onset    Allergies Other     ADD / ADHD Other     Heart disease Other         cardiac disorder    Stroke Other     Depression Other     Hypertension Other     Lung cancer Other     Uterine cancer Other     Ovarian cancer Other     Cancer Mother         left lung    Stroke Mother         x 2    Stroke Father         x 3    Heart disease Father 55        heart attack    No Known Problems Son     No Known Problems Son     No Known Problems Son     No Known Problems Brother     Ovarian cancer Maternal Aunt 45    Breast cancer Neg Hx       Social History     Socioeconomic History    Marital status:     Number of children: 3   Tobacco Use    Smoking status: Every Day     Current packs/day: 0.25     Average packs/day: 0.3 packs/day for 40.0 years (10.0 ttl pk-yrs)     Types: Cigarettes    Smokeless tobacco: Never   Vaping Use    Vaping status: Never Used   Substance and Sexual Activity    Alcohol use: Yes     Alcohol/week: 14.0 standard drinks of alcohol     Types: 14 Cans of beer per week    Drug use: No     Comment: quit MJ    Sexual activity: Defer       Allergies   Allergen Reactions    Penicillins Hives       Review of Systems   Constitutional:  Positive for irritability and malaise/fatigue. Negative for fatigue, weight gain and weight loss.   HENT:  Positive for trouble swallowing. Negative for hoarse voice and sore throat.    Eyes:  Negative for blurred vision.   Respiratory:  Positive for cough and shortness of breath (occasional). Negative for choking and wheezing.    Cardiovascular:  Negative for chest pain, palpitations, orthopnea and PND.   Gastrointestinal:  Positive for heartburn. Negative for abdominal pain, dysphagia, melena and nausea.   Musculoskeletal:  Positive for neck pain. Negative for muscle weakness.   Neurological:  Negative for dizziness and headaches.   Psychiatric/Behavioral:  Negative for confusion, decreased concentration and suicidal ideas. The patient is nervous/anxious and has insomnia.        Anxiety STEVEN-7    Feeling nervous, anxious or on edge: Nearly every day  Not being able to stop or control worrying: Nearly every day  Worrying too much about different things: Nearly every day  Trouble Relaxing: Nearly every day  Being so restless that it is hard to sit still: Nearly every day  Becoming easily annoyed or irritable: Nearly every day  Feeling afraid as if something awful might happen: Several days  STEVEN 7 Total Score: 19  If you checked any problems, how difficult have these problems made it for you to do your work, take care of things at home, or get along with other people: Somewhat difficult         PHQ-9 Depression Screening  Little interest or pleasure in doing things? Several days   Feeling down, depressed, or hopeless? Nearly every day   PHQ-2 Total Score 4   Trouble falling or staying asleep, or sleeping too much? Nearly every day (staying asleep)   Feeling tired or having little energy? Nearly every day   Poor appetite or overeating? Not at all   Feeling bad about yourself - or that you are a failure or have let yourself  or your family down? Nearly every day   Trouble concentrating on things, such as reading the newspaper or watching television? Nearly every day   Moving or speaking so slowly that other people could have noticed? Or the opposite - being so fidgety or restless that you have been moving around a lot more than usual? More than half the days (restless)     Thoughts that you would be better off dead, or of hurting yourself in some way? Not at all   PHQ-9 Total Score 18   If you checked off any problems, how difficult have these problems made it for you to do your work, take care of things at home, or get along with other people? Very difficult         Objective   Physical Exam  Vitals and nursing note reviewed.   Constitutional:       Appearance: She is well-developed and underweight.   HENT:      Head: Normocephalic.      Right Ear: External ear normal.      Left Ear: External ear normal.      Nose: Nose normal.   Eyes:      General: Lids are normal.      Conjunctiva/sclera: Conjunctivae normal.      Pupils: Pupils are equal, round, and reactive to light.   Neck:      Thyroid: No thyroid mass or thyromegaly.      Vascular: No carotid bruit.      Trachea: Trachea normal.   Cardiovascular:      Rate and Rhythm: Normal rate and regular rhythm.      Heart sounds: No murmur heard.  Pulmonary:      Effort: Pulmonary effort is normal. No respiratory distress.      Breath sounds: Normal breath sounds. No decreased breath sounds, wheezing, rhonchi or rales.   Chest:      Chest wall: No tenderness.   Abdominal:      General: Bowel sounds are normal.      Palpations: Abdomen is soft.      Tenderness: There is no abdominal tenderness.   Musculoskeletal:         General: Normal range of motion.      Cervical back: Normal range of motion and neck supple.   Skin:     General: Skin is warm and dry.   Neurological:      Mental Status: She is alert and oriented to person, place, and time.   Psychiatric:         Behavior: Behavior normal.          Assessment & Plan   Problems Addressed this Visit          Cardiac and Vasculature    Essential hypertension - Primary (Chronic)    Relevant Medications    lisinopril (PRINIVIL,ZESTRIL) 40 MG tablet    metoprolol succinate XL (TOPROL-XL) 25 MG 24 hr tablet    Chest pain    Relevant Medications    nitroglycerin (NITROSTAT) 0.4 MG SL tablet    Peripheral vascular disease with claudication    Relevant Medications    atorvastatin (LIPITOR) 80 MG tablet    Other hyperlipidemia    Relevant Medications    atorvastatin (LIPITOR) 80 MG tablet       Genitourinary and Reproductive     Breast cancer screening    Relevant Orders    Mammo Screening Digital Tomosynthesis Bilateral With CAD       Mental Health    Anxiety    Relevant Medications    busPIRone (BUSPAR) 15 MG tablet    Recurrent major depressive disorder, in partial remission    Relevant Medications    buPROPion XL (Wellbutrin XL) 150 MG 24 hr tablet    busPIRone (BUSPAR) 15 MG tablet    citalopram (CeleXA) 10 MG tablet       Neuro    RLS (restless legs syndrome)    Relevant Medications    rOPINIRole (REQUIP) 2 MG tablet       Pulmonary and Pneumonias    Chronic obstructive pulmonary disease (Chronic)    Relevant Medications    albuterol sulfate  (90 Base) MCG/ACT inhaler     Other Visit Diagnoses         Agudelo's esophagus without dysplasia        Relevant Medications    esomeprazole (nexIUM) 40 MG capsule    Other Relevant Orders    Ambulatory Referral to Gastroenterology      Vitamin D deficiency        Relevant Orders    Vitamin D,25-Hydroxy      Flu vaccine need        Relevant Orders    Fluzone >6mos (8667-0952) (Completed)      Gastroesophageal reflux disease with esophagitis without hemorrhage        Relevant Medications    esomeprazole (nexIUM) 40 MG capsule    Other Relevant Orders    H. Pylori Breath Test - , Lung    Ambulatory Referral to Gastroenterology      Dysphagia, unspecified type        Relevant Orders    Ambulatory Referral to  Gastroenterology          Diagnoses         Codes Comments      Essential hypertension    -  Primary ICD-10-CM: I10  ICD-9-CM: 401.9       Peripheral vascular disease with claudication     ICD-10-CM: I73.9  ICD-9-CM: 443.9       Other hyperlipidemia     ICD-10-CM: E78.49  ICD-9-CM: 272.4       Agudelo's esophagus without dysplasia     ICD-10-CM: K22.70  ICD-9-CM: 530.85       Chronic obstructive pulmonary disease, unspecified COPD type     ICD-10-CM: J44.9  ICD-9-CM: 496       RLS (restless legs syndrome)     ICD-10-CM: G25.81  ICD-9-CM: 333.94       Moderate episode of recurrent major depressive disorder     ICD-10-CM: F33.1  ICD-9-CM: 296.32       Anxiety     ICD-10-CM: F41.9  ICD-9-CM: 300.00       Precordial pain     ICD-10-CM: R07.2  ICD-9-CM: 786.51       Encounter for screening mammogram for malignant neoplasm of breast     ICD-10-CM: Z12.31  ICD-9-CM: V76.12       Vitamin D deficiency     ICD-10-CM: E55.9  ICD-9-CM: 268.9       Flu vaccine need     ICD-10-CM: Z23  ICD-9-CM: V04.81       Gastroesophageal reflux disease with esophagitis without hemorrhage     ICD-10-CM: K21.00  ICD-9-CM: 530.81, 530.10       Dysphagia, unspecified type     ICD-10-CM: R13.10  ICD-9-CM: 787.20                    Current Outpatient Medications:     albuterol sulfate  (90 Base) MCG/ACT inhaler, Inhale 2 puffs Every 4 (Four) Hours As Needed for Wheezing or Shortness of Air., Disp: 18 g, Rfl: 3    aspirin 81 MG EC tablet, Take 1 tablet by mouth Daily., Disp: 30 tablet, Rfl: 0    atorvastatin (LIPITOR) 80 MG tablet, Take 1 tablet by mouth Daily., Disp: 30 tablet, Rfl: 3    buPROPion XL (Wellbutrin XL) 150 MG 24 hr tablet, Take 1 tablet by mouth Daily., Disp: 30 tablet, Rfl: 3    busPIRone (BUSPAR) 15 MG tablet, Take 1 tablet by mouth Daily., Disp: 30 tablet, Rfl: 3    citalopram (CeleXA) 10 MG tablet, Take 1 tablet by mouth Daily., Disp: 30 tablet, Rfl: 3    esomeprazole (nexIUM) 40 MG capsule, Take 1 capsule by mouth Daily.,  Disp: 30 capsule, Rfl: 3    lisinopril (PRINIVIL,ZESTRIL) 40 MG tablet, Take 1 tablet by mouth Daily., Disp: 30 tablet, Rfl: 3    metoprolol succinate XL (TOPROL-XL) 25 MG 24 hr tablet, 1/2 tablet po qd, Disp: 15 tablet, Rfl: 3    Multiple Vitamins-Minerals (MULTIVITAMIN WOMEN PO), Take 1 tablet by mouth Daily., Disp: , Rfl:     nitroglycerin (NITROSTAT) 0.4 MG SL tablet, Place 1 tablet under the tongue Every 5 (Five) Minutes As Needed for Chest Pain. Take no more than 3 doses in 15 minutes., Disp: 25 tablet, Rfl: 1    rOPINIRole (REQUIP) 2 MG tablet, Take 1 tablet by mouth Every Night., Disp: 30 tablet, Rfl: 3    Return in about 3 months (around 6/13/2025), or if symptoms worsen or fail to improve, for Recheck.    I spent 27 minutes caring for Sera on this date of service. This time includes time spent by me in the following activities: preparing for the visit, reviewing tests, performing a medically appropriate examination and/or evaluation, counseling and educating the patient/family/caregiver, documenting information in the medical record, ordering medications, ordering test(s), ordering procedure(s), and obtaining a separately obtained history

## 2025-04-17 ENCOUNTER — HOSPITAL ENCOUNTER (OUTPATIENT)
Dept: MAMMOGRAPHY | Facility: HOSPITAL | Age: 60
Discharge: HOME | End: 2025-04-17
Admitting: FAMILY MEDICINE
Payer: COMMERCIAL

## 2025-04-17 DIAGNOSIS — Z12.31 ENCOUNTER FOR SCREENING MAMMOGRAM FOR MALIGNANT NEOPLASM OF BREAST: ICD-10-CM

## 2025-04-17 PROCEDURE — 77067 SCR MAMMO BI INCL CAD: CPT

## 2025-04-17 PROCEDURE — 77063 BREAST TOMOSYNTHESIS BI: CPT

## 2025-06-11 DIAGNOSIS — E78.49 OTHER HYPERLIPIDEMIA: ICD-10-CM

## 2025-06-11 DIAGNOSIS — I73.9 PERIPHERAL VASCULAR DISEASE WITH CLAUDICATION: ICD-10-CM

## 2025-06-12 RX ORDER — ATORVASTATIN CALCIUM 80 MG/1
80 TABLET, FILM COATED ORAL DAILY
Qty: 90 TABLET | Refills: 0 | Status: SHIPPED | OUTPATIENT
Start: 2025-06-12

## 2025-07-21 ENCOUNTER — OFFICE VISIT (OUTPATIENT)
Dept: INTERNAL MEDICINE | Facility: CLINIC | Age: 60
End: 2025-07-21
Payer: COMMERCIAL

## 2025-07-21 VITALS
OXYGEN SATURATION: 93 % | TEMPERATURE: 98 F | HEART RATE: 64 BPM | SYSTOLIC BLOOD PRESSURE: 144 MMHG | WEIGHT: 99.6 LBS | BODY MASS INDEX: 16.57 KG/M2 | DIASTOLIC BLOOD PRESSURE: 96 MMHG

## 2025-07-21 DIAGNOSIS — K22.70 BARRETT'S ESOPHAGUS WITHOUT DYSPLASIA: ICD-10-CM

## 2025-07-21 DIAGNOSIS — F41.9 ANXIETY: ICD-10-CM

## 2025-07-21 DIAGNOSIS — Z79.899 ENCOUNTER FOR LONG-TERM (CURRENT) USE OF MEDICATIONS: Primary | ICD-10-CM

## 2025-07-21 DIAGNOSIS — E55.9 VITAMIN D DEFICIENCY: ICD-10-CM

## 2025-07-21 DIAGNOSIS — I10 ELEVATED BLOOD PRESSURE READING WITH DIAGNOSIS OF HYPERTENSION: ICD-10-CM

## 2025-07-21 DIAGNOSIS — I10 ESSENTIAL HYPERTENSION: ICD-10-CM

## 2025-07-21 DIAGNOSIS — F33.1 MODERATE EPISODE OF RECURRENT MAJOR DEPRESSIVE DISORDER: ICD-10-CM

## 2025-07-21 DIAGNOSIS — J44.9 CHRONIC OBSTRUCTIVE PULMONARY DISEASE, UNSPECIFIED COPD TYPE: ICD-10-CM

## 2025-07-21 DIAGNOSIS — E78.49 OTHER HYPERLIPIDEMIA: ICD-10-CM

## 2025-07-21 DIAGNOSIS — I73.9 PERIPHERAL VASCULAR DISEASE WITH CLAUDICATION: ICD-10-CM

## 2025-07-21 DIAGNOSIS — G25.81 RLS (RESTLESS LEGS SYNDROME): ICD-10-CM

## 2025-07-21 PROCEDURE — 3080F DIAST BP >= 90 MM HG: CPT | Performed by: FAMILY MEDICINE

## 2025-07-21 PROCEDURE — 1125F AMNT PAIN NOTED PAIN PRSNT: CPT | Performed by: FAMILY MEDICINE

## 2025-07-21 PROCEDURE — 3077F SYST BP >= 140 MM HG: CPT | Performed by: FAMILY MEDICINE

## 2025-07-21 PROCEDURE — 99214 OFFICE O/P EST MOD 30 MIN: CPT | Performed by: FAMILY MEDICINE

## 2025-07-21 RX ORDER — BUPROPION HYDROCHLORIDE 150 MG/1
150 TABLET ORAL DAILY
Qty: 30 TABLET | Refills: 3 | Status: SHIPPED | OUTPATIENT
Start: 2025-07-21

## 2025-07-21 RX ORDER — ROPINIROLE 2 MG/1
2 TABLET, FILM COATED ORAL 2 TIMES DAILY
Qty: 30 TABLET | Refills: 3 | Status: SHIPPED | OUTPATIENT
Start: 2025-07-21

## 2025-07-21 RX ORDER — LISINOPRIL 40 MG/1
40 TABLET ORAL DAILY
Qty: 30 TABLET | Refills: 3 | Status: SHIPPED | OUTPATIENT
Start: 2025-07-21

## 2025-07-21 RX ORDER — ESOMEPRAZOLE MAGNESIUM 40 MG/1
40 CAPSULE, DELAYED RELEASE ORAL DAILY
Qty: 30 CAPSULE | Refills: 3 | Status: SHIPPED | OUTPATIENT
Start: 2025-07-21

## 2025-07-21 RX ORDER — ATORVASTATIN CALCIUM 80 MG/1
80 TABLET, FILM COATED ORAL DAILY
Qty: 90 TABLET | Refills: 0 | Status: SHIPPED | OUTPATIENT
Start: 2025-07-21

## 2025-07-21 RX ORDER — METOPROLOL SUCCINATE 25 MG/1
TABLET, EXTENDED RELEASE ORAL
Qty: 15 TABLET | Refills: 3 | Status: SHIPPED | OUTPATIENT
Start: 2025-07-21

## 2025-07-21 RX ORDER — BUSPIRONE HYDROCHLORIDE 15 MG/1
15 TABLET ORAL DAILY
Qty: 30 TABLET | Refills: 3 | Status: SHIPPED | OUTPATIENT
Start: 2025-07-21

## 2025-07-21 RX ORDER — CITALOPRAM HYDROBROMIDE 10 MG/1
10 TABLET ORAL DAILY
Qty: 30 TABLET | Refills: 3 | Status: SHIPPED | OUTPATIENT
Start: 2025-07-21

## 2025-07-21 RX ORDER — ALBUTEROL SULFATE 90 UG/1
2 INHALANT RESPIRATORY (INHALATION) EVERY 4 HOURS PRN
Qty: 18 G | Refills: 3 | Status: SHIPPED | OUTPATIENT
Start: 2025-07-21

## 2025-07-21 NOTE — PROGRESS NOTES
Subjective   Sera Guidry is a 59 y.o. female.         Chief Complaint   Patient presents with    Anxiety    Depression    Hypertension    Hyperlipidemia       Visit Vitals  /96 (BP Location: Left arm, Patient Position: Sitting, Cuff Size: Small Adult)   Pulse 64   Temp 98 °F (36.7 °C)   Wt 45.2 kg (99 lb 9.6 oz)   SpO2 93%   BMI 16.57 kg/m²       Wt Readings from Last 3 Encounters:   07/21/25 45.2 kg (99 lb 9.6 oz)   03/13/25 44.5 kg (98 lb)   07/31/23 44.9 kg (99 lb)         Anxiety  Visit:  Follow-up  Initial visit:     PMH includes: depression    Follow-up visit:     Medication compliance:  %    Symptoms: depressed mood, excessive worry, irritability, nervous/anxious, panic, restlessness and shortness of breath (occasional)      Symptoms: no chest pain, no feeling of choking, no compulsions, no confusion, no decreased concentration, no dizziness, no dry mouth, no hyperventilation, does not have insomnia, no malaise/fatigue, no muscle tension, no nausea, no obsessions, no palpitations and no suicidal ideas      Frequency:  Most days    Severity:  Mild    Sleep quality:  Fair    Treatments tried:  SSRIs, non-SSRI antidepressants and non-benzodiazephine anxiolytics    Improvement on treatment:  Moderate  Depression  Visit:  Follow-up  Follow-up Visit:     Medication compliance:  %    Symptoms: depressed mood, excessive worry, irritability, nervousness/anxious, panic, shortness of breath (occasional) and difficulty controlling mood      Symptoms: no chest pain, no feeling of choking, no compulsions, no confusion, no decreased concentration, no dizziness, no dry mouth, no hyperventilation, does not have insomnia, no malaise/fatigue, no muscle tension, no nausea, no obsessions, no palpitations, no suicidal ideas, no thoughts that death would be easier, does not have a plan, no hopelessness, no feelings of worthlessness, no hypersomnia, no psychomotor agitation, no psychomotor retardation, no  weight gain and no weight loss      Frequency:  Most days    Severity:  Mild    Sleep quality:  Fair    Nighttime awakenings:     PMH Includes: depression      Treatment tried:  Non-SSRI antidepressants, non-benzodiazephine anxiolytics and SSRI    Improvement on treatment:  Moderate  Hypertension  Chronicity:  Chronic  Onset:  More than 1 year ago  Progression since onset:  Worse  Condition status:  Uncontrolled  Associated symptoms: anxiety and shortness of breath (occasional)    Associated symptoms: no blurred vision, no chest pain, no headaches, no malaise/fatigue, no neck pain, no orthopnea, no palpitations, no peripheral edema, no PND, no sweats, no dyspnea with exertion and no dizziness    Agents associated with hypertension:  No associated agents  CAD risks:  Dyslipidemia, post-menopausal state and family history  Current therapy:  Beta blockers and ACE inhibitors  Improvement on treatment:  Moderate  Compliance problems:  No compliance problems  End-organ damage: no angina, no kidney disease, no CAD/MI, no CVA, no heart failure, no left ventricular hypertrophy, no PVD and no retinopathy    Identifiable causes: thyroid problem    Identifiable causes: no chronic renal disease and no sleep apnea    Hyperlipidemia  The current episode started more than 1 year ago. The problem is controlled. Recent lipid tests were reviewed and are normal. She has no history of chronic renal disease, diabetes, hypothyroidism, liver disease, obesity or nephrotic syndrome. Factors aggravating her hyperlipidemia include beta blockers. Associated symptoms include myalgias and shortness of breath (occasional). Pertinent negatives include no chest pain, focal sensory loss, focal weakness or leg pain. Current antihyperlipidemic treatment includes statins. The current treatment provides significant improvement of lipids. There are no compliance problems.  Risk factors for coronary artery disease include dyslipidemia, family history,  hypertension and post-menopausal.   Heartburn  She reports no abdominal pain, no belching, no chest pain, no choking, no coughing, no dysphagia, no early satiety, no globus sensation, no heartburn, no hoarse voice, no nausea, no sore throat, no stridor, no tooth decay, no water brash or no wheezing. This is a chronic problem. The current episode started more than 1 year ago. The problem occurs rarely. The problem has been resolved. The symptoms are aggravated by certain foods. Pertinent negatives include no anemia, fatigue, melena, muscle weakness, orthopnea or weight loss. Risk factors include Agudelo's esophagus and smoking/tobacco exposure. She has tried a PPI for the symptoms. The treatment provided significant relief. Past procedures include an EGD.      Pt reports that she has cut back on her alcohol to 2 beers per day.  Pt has not needed NTG in 3 months.    The following portions of the patient's history were reviewed and updated as appropriate: allergies, current medications, past family history, past medical history, past social history, past surgical history, and problem list.    Past Medical History:   Diagnosis Date    Adenomatous colon polyp 06/27/2023    Dr Patrick, 5 yr f/u    Allergy     Anxiety     Asthma     Agudelo's esophagus 06/27/2023    Centrilobular emphysema 04/08/2022    Cervical cancer     Chest pain     COPD (chronic obstructive pulmonary disease)     Depression     Endometriosis     GERD (gastroesophageal reflux disease)     H/O echocardiogram 04/17/2018    EF 60% mild TR    Heart murmur     Hypertension     Osteoarthritis     Other hyperlipidemia 11/27/2018    Ovarian cancer     endometrial/cervical    Ovarian cyst     Scoliosis       Past Surgical History:   Procedure Laterality Date    APPENDECTOMY      CARDIAC CATHETERIZATION N/A 04/01/2020    Procedure: Coronary angiography;  Surgeon: Chacorta Mohan MD;  Location: [facility] CATH INVASIVE LOCATION;  Service: Cardiology;  Laterality:  N/A;    COLONOSCOPY  09/11/2018    Dr Patrick    COLONOSCOPY W/ POLYPECTOMY  06/27/2023    Dr Patrick, adenomatous, 5 yr f/u    HYSTERECTOMY      ORIF WRIST FRACTURE Left 09/2017    with 2 plates    TOTAL ABDOMINAL HYSTERECTOMY WITH SALPINGO OOPHORECTOMY      ovaries removed at 2 separate occcasions    UPPER GASTROINTESTINAL ENDOSCOPY  06/27/2023    Dr Patrick      Family History   Problem Relation Age of Onset    Allergies Other     ADD / ADHD Other     Heart disease Other         cardiac disorder    Stroke Other     Depression Other     Hypertension Other     Lung cancer Other     Uterine cancer Other     Ovarian cancer Other     Cancer Mother         left lung    Stroke Mother         x 2    Stroke Father         x 3    Heart disease Father 55        heart attack    No Known Problems Son     No Known Problems Son     No Known Problems Son     No Known Problems Brother     Ovarian cancer Maternal Aunt 45    Breast cancer Neg Hx       Social History     Socioeconomic History    Marital status:     Number of children: 3   Tobacco Use    Smoking status: Every Day     Current packs/day: 0.25     Average packs/day: 0.3 packs/day for 40.0 years (10.0 ttl pk-yrs)     Types: Cigarettes    Smokeless tobacco: Never   Vaping Use    Vaping status: Never Used   Substance and Sexual Activity    Alcohol use: Yes     Alcohol/week: 14.0 standard drinks of alcohol     Types: 14 Cans of beer per week    Drug use: No     Comment: quit MJ    Sexual activity: Defer      Allergies   Allergen Reactions    Penicillins Hives       Review of Systems   Constitutional:  Positive for irritability. Negative for fatigue, malaise/fatigue, weight gain and weight loss.   HENT:  Negative for hoarse voice and sore throat.    Eyes:  Negative for blurred vision.   Respiratory:  Positive for shortness of breath (occasional). Negative for cough, choking and wheezing.    Cardiovascular:  Negative for chest pain, palpitations, orthopnea and PND.    Gastrointestinal:  Negative for abdominal pain, dysphagia, heartburn, melena and nausea.   Musculoskeletal:  Positive for myalgias. Negative for muscle weakness and neck pain.   Neurological:  Negative for dizziness, focal weakness and headaches.   Psychiatric/Behavioral:  Negative for confusion, decreased concentration and suicidal ideas. The patient is nervous/anxious. The patient does not have insomnia.    PHQ-9 Depression Screening  Little interest or pleasure in doing things? Several days   Feeling down, depressed, or hopeless? Several days   PHQ-2 Total Score 2   Trouble falling or staying asleep, or sleeping too much? Not at all   Feeling tired or having little energy? Several days   Poor appetite or overeating? Not at all   Feeling bad about yourself - or that you are a failure or have let yourself or your family down? Not at all   Trouble concentrating on things, such as reading the newspaper or watching television? Several days   Moving or speaking so slowly that other people could have noticed? Or the opposite - being so fidgety or restless that you have been moving around a lot more than usual? Nearly every day (restless)     Thoughts that you would be better off dead, or of hurting yourself in some way? Not at all   PHQ-9 Total Score 7   If you checked off any problems, how difficult have these problems made it for you to do your work, take care of things at home, or get along with other people? Somewhat difficult         Anxiety STEVEN-7    Feeling nervous, anxious or on edge: More than half the days  Not being able to stop or control worrying: Nearly every day  Worrying too much about different things: Nearly every day  Trouble Relaxing: Nearly every day  Being so restless that it is hard to sit still: More than half the days  Becoming easily annoyed or irritable: Nearly every day  Feeling afraid as if something awful might happen: Not at all  STEVEN 7 Total Score: 16  If you checked any problems, how  difficult have these problems made it for you to do your work, take care of things at home, or get along with other people: Somewhat difficult       Objective   Physical Exam  Vitals and nursing note reviewed.   Constitutional:       Appearance: She is well-developed.   HENT:      Head: Normocephalic.      Right Ear: External ear normal.      Left Ear: External ear normal.      Nose: Nose normal.   Eyes:      General: Lids are normal.      Conjunctiva/sclera: Conjunctivae normal.      Pupils: Pupils are equal, round, and reactive to light.   Neck:      Thyroid: No thyroid mass or thyromegaly.      Vascular: No carotid bruit.      Trachea: Trachea normal.   Cardiovascular:      Rate and Rhythm: Normal rate and regular rhythm.      Heart sounds: No murmur heard.  Pulmonary:      Effort: Pulmonary effort is normal. No respiratory distress.      Breath sounds: Normal breath sounds. No decreased breath sounds, wheezing, rhonchi or rales.   Chest:      Chest wall: No tenderness.   Abdominal:      General: Bowel sounds are normal.      Palpations: Abdomen is soft.      Tenderness: There is no abdominal tenderness.   Musculoskeletal:         General: Normal range of motion.      Cervical back: Normal range of motion and neck supple.   Skin:     General: Skin is warm and dry.   Neurological:      Mental Status: She is alert and oriented to person, place, and time.   Psychiatric:         Behavior: Behavior normal.         Assessment & Plan   Problems Addressed this Visit          Cardiac and Vasculature    Essential hypertension (Chronic)    Relevant Medications    metoprolol succinate XL (TOPROL-XL) 25 MG 24 hr tablet    lisinopril (PRINIVIL,ZESTRIL) 40 MG tablet    Other Relevant Orders    Comprehensive Metabolic Panel    Lipid Panel    TSH Rfx On Abnormal To Free T4    CBC & Differential    Peripheral vascular disease with claudication    Relevant Medications    atorvastatin (LIPITOR) 80 MG tablet    Other hyperlipidemia     Relevant Medications    atorvastatin (LIPITOR) 80 MG tablet    Other Relevant Orders    Comprehensive Metabolic Panel    Lipid Panel       Mental Health    Anxiety    Relevant Medications    busPIRone (BUSPAR) 15 MG tablet       Neuro    RLS (restless legs syndrome)    Relevant Medications    rOPINIRole (REQUIP) 2 MG tablet       Pulmonary and Pneumonias    Chronic obstructive pulmonary disease (Chronic)    Relevant Medications    albuterol sulfate  (90 Base) MCG/ACT inhaler     Other Visit Diagnoses         Encounter for long-term (current) use of medications    -  Primary    Relevant Orders    Vitamin B1, Whole Blood      Agudelo's esophagus without dysplasia        Relevant Medications    esomeprazole (nexIUM) 40 MG capsule      Moderate episode of recurrent major depressive disorder        Relevant Medications    citalopram (CeleXA) 10 MG tablet    busPIRone (BUSPAR) 15 MG tablet    buPROPion XL (Wellbutrin XL) 150 MG 24 hr tablet      Vitamin D deficiency        Relevant Orders    Vitamin D,25-Hydroxy      Elevated blood pressure reading with diagnosis of hypertension        Relevant Medications    metoprolol succinate XL (TOPROL-XL) 25 MG 24 hr tablet    lisinopril (PRINIVIL,ZESTRIL) 40 MG tablet          Diagnoses         Codes Comments      Encounter for long-term (current) use of medications    -  Primary ICD-10-CM: Z79.899  ICD-9-CM: V58.69       RLS (restless legs syndrome)     ICD-10-CM: G25.81  ICD-9-CM: 333.94       Essential hypertension     ICD-10-CM: I10  ICD-9-CM: 401.9       Agudelo's esophagus without dysplasia     ICD-10-CM: K22.70  ICD-9-CM: 530.85       Moderate episode of recurrent major depressive disorder     ICD-10-CM: F33.1  ICD-9-CM: 296.32       Anxiety     ICD-10-CM: F41.9  ICD-9-CM: 300.00       Peripheral vascular disease with claudication     ICD-10-CM: I73.9  ICD-9-CM: 443.9       Other hyperlipidemia     ICD-10-CM: E78.49  ICD-9-CM: 272.4       Chronic obstructive  pulmonary disease, unspecified COPD type     ICD-10-CM: J44.9  ICD-9-CM: 496       Vitamin D deficiency     ICD-10-CM: E55.9  ICD-9-CM: 268.9       Elevated blood pressure reading with diagnosis of hypertension     ICD-10-CM: I10  ICD-9-CM: 401.9             Discussed changing anxiety/depression meds, which pt declined.   Increase requip to bid         Current Outpatient Medications:     albuterol sulfate  (90 Base) MCG/ACT inhaler, Inhale 2 puffs Every 4 (Four) Hours As Needed for Wheezing or Shortness of Air., Disp: 18 g, Rfl: 3    aspirin 81 MG EC tablet, Take 1 tablet by mouth Daily., Disp: 30 tablet, Rfl: 0    atorvastatin (LIPITOR) 80 MG tablet, Take 1 tablet by mouth Daily., Disp: 90 tablet, Rfl: 0    buPROPion XL (Wellbutrin XL) 150 MG 24 hr tablet, Take 1 tablet by mouth Daily., Disp: 30 tablet, Rfl: 3    busPIRone (BUSPAR) 15 MG tablet, Take 1 tablet by mouth Daily., Disp: 30 tablet, Rfl: 3    citalopram (CeleXA) 10 MG tablet, Take 1 tablet by mouth Daily., Disp: 30 tablet, Rfl: 3    esomeprazole (nexIUM) 40 MG capsule, Take 1 capsule by mouth Daily., Disp: 30 capsule, Rfl: 3    lisinopril (PRINIVIL,ZESTRIL) 40 MG tablet, Take 1 tablet by mouth Daily., Disp: 30 tablet, Rfl: 3    metoprolol succinate XL (TOPROL-XL) 25 MG 24 hr tablet, 1/2 tablet po qd, Disp: 15 tablet, Rfl: 3    Multiple Vitamins-Minerals (MULTIVITAMIN WOMEN PO), Take 1 tablet by mouth Daily., Disp: , Rfl:     nitroglycerin (NITROSTAT) 0.4 MG SL tablet, Place 1 tablet under the tongue Every 5 (Five) Minutes As Needed for Chest Pain. Take no more than 3 doses in 15 minutes., Disp: 25 tablet, Rfl: 1    rOPINIRole (REQUIP) 2 MG tablet, Take 1 tablet by mouth 2 (Two) Times a Day., Disp: 30 tablet, Rfl: 3    Return in about 3 months (around 10/21/2025), or if symptoms worsen or fail to improve, for Recheck bp check with nurse in 2 weeks.

## (undated) DEVICE — PK CATH CARD 10

## (undated) DEVICE — GUIDE CATHETER: Brand: MACH1™

## (undated) DEVICE — MODEL BT2000 P/N 700287-012KIT CONTENTS: MANIFOLD WITH SALINE AND CONTRAST PORTS, SALINE TUBING WITH SPIKE AND HAND SYRINGE, TRANSDUCER: Brand: BT2000 AUTOMATED MANIFOLD KIT

## (undated) DEVICE — GW PRESSUREWIRE X WIRELESS FFR 175CM

## (undated) DEVICE — SKIN PREP TRAY W/CHG: Brand: MEDLINE INDUSTRIES, INC.

## (undated) DEVICE — CATH DIAG EXPO .045 FL3.5 5F 100CM

## (undated) DEVICE — DEV COMP RAD PRELUDESYNC 24CM

## (undated) DEVICE — CATH DIAG EXPO M/ PK 5F FL4/FR4 PIG

## (undated) DEVICE — MODEL AT P65, P/N 701554-001KIT CONTENTS: HAND CONTROLLER, 3-WAY HIGH-PRESSURE STOPCOCK WITH ROTATING END AND PREMIUM HIGH-PRESSURE TUBING: Brand: ANGIOTOUCH® KIT

## (undated) DEVICE — GW PERIPH GUIDERIGHT STD/EXCHNG/J/TIP SS 0.035IN 5X260CM

## (undated) DEVICE — GLIDESHEATH SLENDER STAINLESS STEEL KIT: Brand: GLIDESHEATH SLENDER